# Patient Record
Sex: FEMALE | Race: WHITE | NOT HISPANIC OR LATINO | Employment: UNEMPLOYED | URBAN - METROPOLITAN AREA
[De-identification: names, ages, dates, MRNs, and addresses within clinical notes are randomized per-mention and may not be internally consistent; named-entity substitution may affect disease eponyms.]

---

## 2023-02-14 PROBLEM — R63.5 WEIGHT GAIN: Status: ACTIVE | Noted: 2023-02-14

## 2023-02-14 PROBLEM — L65.9 HAIR LOSS: Status: ACTIVE | Noted: 2023-02-14

## 2023-02-14 PROBLEM — N28.1 CYST OF RIGHT KIDNEY: Status: ACTIVE | Noted: 2023-02-14

## 2023-02-14 PROBLEM — N64.89 BREAST ASYMMETRY IN FEMALE: Status: ACTIVE | Noted: 2023-02-14

## 2023-02-14 PROBLEM — R31.29 OTHER MICROSCOPIC HEMATURIA: Status: ACTIVE | Noted: 2023-02-14

## 2023-02-14 PROBLEM — E55.9 VITAMIN D DEFICIENCY: Status: ACTIVE | Noted: 2023-02-14

## 2023-02-14 PROBLEM — K76.0 HEPATIC STEATOSIS: Status: ACTIVE | Noted: 2023-02-14

## 2023-02-14 PROBLEM — H91.93 BILATERAL HEARING LOSS: Status: ACTIVE | Noted: 2023-02-14

## 2023-02-14 PROBLEM — E78.2 HYPERLIPEMIA, MIXED: Status: ACTIVE | Noted: 2023-02-14

## 2023-02-14 PROBLEM — I73.9 PERIPHERAL ARTERIAL DISEASE (CMS-HCC): Status: ACTIVE | Noted: 2023-02-14

## 2023-02-14 PROBLEM — R10.32 ACUTE ABDOMINAL PAIN IN LEFT LOWER QUADRANT: Status: ACTIVE | Noted: 2023-02-14

## 2023-02-14 PROBLEM — R10.33 ABDOMINAL PAIN, ACUTE, PERIUMBILICAL: Status: ACTIVE | Noted: 2023-02-14

## 2023-02-14 PROBLEM — K43.9 VENTRAL HERNIA: Status: ACTIVE | Noted: 2023-02-14

## 2023-02-14 PROBLEM — E03.9 HYPOTHYROIDISM: Status: ACTIVE | Noted: 2023-02-14

## 2023-02-14 PROBLEM — Z85.41 HISTORY OF CERVICAL CANCER: Status: ACTIVE | Noted: 2023-02-14

## 2023-02-14 PROBLEM — R91.1 NODULE OF LOWER LOBE OF RIGHT LUNG: Status: ACTIVE | Noted: 2023-02-14

## 2023-02-14 PROBLEM — R30.0 DYSURIA: Status: ACTIVE | Noted: 2023-02-14

## 2023-02-14 PROBLEM — K57.30 DIVERTICULOSIS OF COLON: Status: ACTIVE | Noted: 2023-02-14

## 2023-02-14 PROBLEM — S61.451A CAT BITE OF RIGHT HAND: Status: ACTIVE | Noted: 2023-02-14

## 2023-02-14 PROBLEM — M81.0 POSTMENOPAUSAL BONE LOSS: Status: ACTIVE | Noted: 2023-02-14

## 2023-02-14 PROBLEM — D64.9 ANEMIA: Status: ACTIVE | Noted: 2023-02-14

## 2023-02-14 PROBLEM — L25.9 CONTACT DERMATITIS: Status: ACTIVE | Noted: 2023-02-14

## 2023-02-14 PROBLEM — E66.01 MORBID OBESITY (MULTI): Status: ACTIVE | Noted: 2023-02-14

## 2023-02-14 PROBLEM — R60.0 BILATERAL EDEMA OF LOWER EXTREMITY: Status: ACTIVE | Noted: 2023-02-14
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
## 2023-02-14 PROBLEM — D50.9 IRON DEFICIENCY ANEMIA: Status: ACTIVE | Noted: 2023-02-14

## 2023-02-14 PROBLEM — R91.1 NODULE OF MIDDLE LOBE OF RIGHT LUNG: Status: ACTIVE | Noted: 2023-02-14

## 2023-02-14 PROBLEM — I83.91 VARICOSE VEINS OF RIGHT LOWER EXTREMITY: Status: ACTIVE | Noted: 2023-02-14

## 2023-02-14 RX ORDER — FERROUS GLUCONATE 324(38)MG
1 TABLET ORAL DAILY
COMMUNITY
Start: 2022-08-23 | End: 2023-03-27 | Stop reason: SDUPTHER

## 2023-02-14 RX ORDER — DOXYCYCLINE 100 MG/1
1 CAPSULE ORAL EVERY 12 HOURS
COMMUNITY
Start: 2022-09-06 | End: 2024-01-11 | Stop reason: WASHOUT

## 2023-02-14 RX ORDER — ERGOCALCIFEROL 1.25 MG/1
1 CAPSULE ORAL
COMMUNITY
Start: 2020-03-02 | End: 2023-03-27 | Stop reason: SDUPTHER

## 2023-02-14 RX ORDER — CLOPIDOGREL BISULFATE 75 MG/1
1 TABLET ORAL DAILY
COMMUNITY
Start: 2020-03-11 | End: 2023-03-27 | Stop reason: SDUPTHER

## 2023-03-27 ENCOUNTER — OFFICE VISIT (OUTPATIENT)
Dept: PRIMARY CARE | Facility: CLINIC | Age: 73
End: 2023-03-27
Payer: MEDICARE

## 2023-03-27 VITALS
SYSTOLIC BLOOD PRESSURE: 144 MMHG | OXYGEN SATURATION: 97 % | RESPIRATION RATE: 18 BRPM | BODY MASS INDEX: 42.69 KG/M2 | WEIGHT: 232 LBS | HEIGHT: 62 IN | HEART RATE: 78 BPM | DIASTOLIC BLOOD PRESSURE: 92 MMHG | TEMPERATURE: 97.1 F

## 2023-03-27 DIAGNOSIS — K29.70 HELICOBACTER PYLORI GASTRITIS: ICD-10-CM

## 2023-03-27 DIAGNOSIS — E55.9 VITAMIN D DEFICIENCY: ICD-10-CM

## 2023-03-27 DIAGNOSIS — E78.2 HYPERLIPEMIA, MIXED: ICD-10-CM

## 2023-03-27 DIAGNOSIS — E03.9 HYPOTHYROIDISM, UNSPECIFIED TYPE: ICD-10-CM

## 2023-03-27 DIAGNOSIS — Z00.00 ROUTINE GENERAL MEDICAL EXAMINATION AT HEALTH CARE FACILITY: ICD-10-CM

## 2023-03-27 DIAGNOSIS — R60.0 BILATERAL EDEMA OF LOWER EXTREMITY: ICD-10-CM

## 2023-03-27 DIAGNOSIS — I73.9 PERIPHERAL ARTERIAL DISEASE (CMS-HCC): Primary | ICD-10-CM

## 2023-03-27 DIAGNOSIS — B96.81 HELICOBACTER PYLORI GASTRITIS: ICD-10-CM

## 2023-03-27 DIAGNOSIS — E66.01 MORBID OBESITY (MULTI): ICD-10-CM

## 2023-03-27 DIAGNOSIS — D50.9 IRON DEFICIENCY ANEMIA, UNSPECIFIED IRON DEFICIENCY ANEMIA TYPE: ICD-10-CM

## 2023-03-27 DIAGNOSIS — K57.30 DIVERTICULOSIS OF COLON: ICD-10-CM

## 2023-03-27 DIAGNOSIS — R63.5 WEIGHT GAIN: ICD-10-CM

## 2023-03-27 DIAGNOSIS — K76.0 HEPATIC STEATOSIS: ICD-10-CM

## 2023-03-27 PROCEDURE — 1157F ADVNC CARE PLAN IN RCRD: CPT | Performed by: INTERNAL MEDICINE

## 2023-03-27 PROCEDURE — 1036F TOBACCO NON-USER: CPT | Performed by: INTERNAL MEDICINE

## 2023-03-27 PROCEDURE — 99214 OFFICE O/P EST MOD 30 MIN: CPT | Performed by: INTERNAL MEDICINE

## 2023-03-27 PROCEDURE — 1170F FXNL STATUS ASSESSED: CPT | Performed by: INTERNAL MEDICINE

## 2023-03-27 PROCEDURE — G0439 PPPS, SUBSEQ VISIT: HCPCS | Performed by: INTERNAL MEDICINE

## 2023-03-27 PROCEDURE — 1160F RVW MEDS BY RX/DR IN RCRD: CPT | Performed by: INTERNAL MEDICINE

## 2023-03-27 PROCEDURE — 1159F MED LIST DOCD IN RCRD: CPT | Performed by: INTERNAL MEDICINE

## 2023-03-27 RX ORDER — FERROUS GLUCONATE 324(38)MG
1 TABLET ORAL DAILY
Qty: 90 TABLET | Refills: 1 | Status: SHIPPED | OUTPATIENT
Start: 2023-03-27 | End: 2023-06-25

## 2023-03-27 RX ORDER — CLOPIDOGREL BISULFATE 75 MG/1
75 TABLET ORAL DAILY
Qty: 90 TABLET | Refills: 1 | Status: SHIPPED | OUTPATIENT
Start: 2023-03-27 | End: 2023-06-25

## 2023-03-27 RX ORDER — ERGOCALCIFEROL 1.25 MG/1
1 CAPSULE ORAL
Qty: 12 CAPSULE | Refills: 1 | Status: SHIPPED | OUTPATIENT
Start: 2023-03-27 | End: 2023-06-25

## 2023-03-27 SDOH — ECONOMIC STABILITY: FOOD INSECURITY: WITHIN THE PAST 12 MONTHS, THE FOOD YOU BOUGHT JUST DIDN'T LAST AND YOU DIDN'T HAVE MONEY TO GET MORE.: NEVER TRUE

## 2023-03-27 SDOH — ECONOMIC STABILITY: FOOD INSECURITY: WITHIN THE PAST 12 MONTHS, YOU WORRIED THAT YOUR FOOD WOULD RUN OUT BEFORE YOU GOT MONEY TO BUY MORE.: NEVER TRUE

## 2023-03-27 ASSESSMENT — LIFESTYLE VARIABLES
HOW OFTEN DO YOU HAVE A DRINK CONTAINING ALCOHOL: NEVER
SKIP TO QUESTIONS 9-10: 1
HOW MANY STANDARD DRINKS CONTAINING ALCOHOL DO YOU HAVE ON A TYPICAL DAY: PATIENT DOES NOT DRINK
HOW OFTEN DO YOU HAVE SIX OR MORE DRINKS ON ONE OCCASION: NEVER
AUDIT-C TOTAL SCORE: 0

## 2023-03-27 ASSESSMENT — ACTIVITIES OF DAILY LIVING (ADL)
DOING_HOUSEWORK: INDEPENDENT
TAKING_MEDICATION: INDEPENDENT
BATHING: INDEPENDENT
MANAGING_FINANCES: INDEPENDENT
DRESSING: INDEPENDENT
GROCERY_SHOPPING: INDEPENDENT

## 2023-03-27 ASSESSMENT — ENCOUNTER SYMPTOMS
FEVER: 0
DIZZINESS: 0
HEADACHES: 0
LIGHT-HEADEDNESS: 0
SHORTNESS OF BREATH: 0
PALPITATIONS: 0
UNEXPECTED WEIGHT CHANGE: 0
ABDOMINAL PAIN: 0
CHILLS: 0
MUSCULOSKELETAL NEGATIVE: 1
OCCASIONAL FEELINGS OF UNSTEADINESS: 0
DEPRESSION: 0
DIARRHEA: 0
PSYCHIATRIC NEGATIVE: 1
CHEST TIGHTNESS: 0
DIAPHORESIS: 0
POLYDIPSIA: 0
VOMITING: 0
NAUSEA: 0
COUGH: 0
EYES NEGATIVE: 1
FATIGUE: 1
ACTIVITY CHANGE: 0
ABDOMINAL DISTENTION: 0
APPETITE CHANGE: 0
LOSS OF SENSATION IN FEET: 0
BLOOD IN STOOL: 0
CONSTIPATION: 0
ENDOCRINE NEGATIVE: 1

## 2023-03-27 ASSESSMENT — PATIENT HEALTH QUESTIONNAIRE - PHQ9
2. FEELING DOWN, DEPRESSED OR HOPELESS: NOT AT ALL
SUM OF ALL RESPONSES TO PHQ9 QUESTIONS 1 & 2: 0
1. LITTLE INTEREST OR PLEASURE IN DOING THINGS: NOT AT ALL

## 2023-03-27 NOTE — PROGRESS NOTES
"Subjective   Patient ID: Kayleen Rincon is a 73 y.o. female who presents for Follow-up and Medicare Annual Wellness Visit Subsequent.    HPI   Patient is here for follow-up after EGD on 11/2022 with Dr. Ion Knapp out of [facility]. Patient was diagnosed with H. Pylori and received instructions to take Tetracycline, metronidazole, and bismuth subsalicylate as prescribed. She completed this course. Has not experienced symptoms of heart burn, n/v, diarrhea, blood in stool. Was supposed to provide stool sample but was never contacted via [facility] for instructions on how to do that.   Has concerns about weight gain although diet and exercise have not changed.   Worried about chronic iron deficiency anemia, fatigue has worsened with continued use of iron supplementation. Patient  tested positive for H Pylori per EGD that was completed for evaluation of anemia, she had no symptoms of GERD    This is also a medicare wellness exam     Review of Systems   Constitutional:  Positive for fatigue (Worsened over last few months). Negative for activity change, appetite change, chills, diaphoresis, fever and unexpected weight change.   HENT: Negative.     Eyes: Negative.    Respiratory:  Negative for cough, chest tightness and shortness of breath.    Cardiovascular:  Negative for chest pain, palpitations and leg swelling.   Gastrointestinal:  Negative for abdominal distention, abdominal pain, blood in stool, constipation, diarrhea, nausea and vomiting.   Endocrine: Negative.  Negative for cold intolerance, heat intolerance, polydipsia and polyuria.   Musculoskeletal: Negative.    Skin: Negative.    Neurological:  Negative for dizziness, syncope, light-headedness and headaches.   Psychiatric/Behavioral: Negative.         Objective   BP (!) 144/92 (BP Location: Left arm, Patient Position: Sitting, BP Cuff Size: Large adult)   Pulse 78   Temp 36.2 °C (97.1 °F)   Resp 18   Ht 1.575 m (5' 2\")   Wt 105 kg " (232 lb)   SpO2 97%   BMI 42.43 kg/m²     Physical Exam  Vitals reviewed.   Constitutional:       Appearance: Normal appearance. She is obese. She is not ill-appearing.   HENT:      Head: Normocephalic and atraumatic.   Eyes:      Extraocular Movements: Extraocular movements intact.      Conjunctiva/sclera: Conjunctivae normal.   Cardiovascular:      Rate and Rhythm: Normal rate and regular rhythm.      Pulses: Normal pulses.      Heart sounds: Normal heart sounds.   Pulmonary:      Effort: Pulmonary effort is normal.      Comments: Diminished breath sounds throughout all fields  Chest:      Chest wall: No tenderness.   Abdominal:      General: Bowel sounds are normal.      Palpations: Abdomen is soft.      Tenderness: There is no abdominal tenderness. There is no guarding or rebound.      Comments: Abdomen is obese   Musculoskeletal:         General: Normal range of motion.      Cervical back: Normal range of motion and neck supple.   Skin:     General: Skin is warm and dry.   Neurological:      General: No focal deficit present.      Mental Status: She is alert and oriented to person, place, and time.   Psychiatric:         Mood and Affect: Mood normal.         Behavior: Behavior normal.         Assessment/Plan   Problem List Items Addressed This Visit          Circulatory    Peripheral arterial disease (CMS/HCC) - Primary, no changes for now, check routine labs       Digestive    Diverticulosis of colon     H. Pylori diagnosed on 11/23. Patient finished course of metronidazole, tetracycline. Patient self-discontinued omeprazole and only takes it as needed. Will check H pylori stool antigen, this appears to have been  ordered by GI in the past         Hepatic steatosis       Musculoskeletal    Bilateral edema of lower extremity: no edema noted today       Endocrine/Metabolic    Hypothyroidism: check labs as ordered    Morbid obesity (CMS/HCC)    Vitamin D deficiency: check labs       Hematologic    Iron  deficiency anemia: recheck labs, hopefully this resolved since H pylori was treated       Other    Hyperlipemia, mixed: check labs no med changes for now   Medicare wellness exam completed today also, colonoscopy was recently completed  Follow-up here in 6 months with routine blood work.

## 2023-03-27 NOTE — PROGRESS NOTES
"Subjective   Reason for Visit: Kayleen Rincon is an 73 y.o. female here for a Medicare Wellness visit.     Past Medical, Surgical, and Family History reviewed and updated in chart.    Reviewed all medications by prescribing practitioner or clinical pharmacist (such as prescriptions, OTCs, herbal therapies and supplements) and documented in the medical record.    HPI    Patient Care Team:  Kailash Triplett MD as PCP - General  Kailash Triplett MD as PCP - [employer] PCP     Review of Systems    Objective   Vitals:  BP (!) 144/92 (BP Location: Left arm, Patient Position: Sitting, BP Cuff Size: Large adult)   Pulse 78   Temp 36.2 °C (97.1 °F)   Resp 18   Ht 1.575 m (5' 2\")   Wt 105 kg (232 lb)   SpO2 97%   BMI 42.43 kg/m²       Physical Exam    Assessment/Plan   Problem List Items Addressed This Visit        Circulatory    Peripheral arterial disease (CMS/HCC) - Primary    Relevant Medications    clopidogrel (Plavix) 75 mg tablet       Digestive    Diverticulosis of colon    Current Assessment & Plan     H. Pylori diagnosed on 11/23. Patient finished course of metronidazole, tetracycline. Patient self-discontinued omeprazole and mario take it as needed.         Hepatic steatosis    Helicobacter pylori gastritis    Relevant Orders    H. pylori antigen, stool       Musculoskeletal    Bilateral edema of lower extremity       Endocrine/Metabolic    Hypothyroidism    Morbid obesity (CMS/HCC)    Vitamin D deficiency    Relevant Medications    ergocalciferol (Vitamin D-2) 1.25 MG (08420 UT) capsule    Weight gain    Current Assessment & Plan     15-20 pound unintentional weight gain without change in diet and exercise.            Hematologic    Iron deficiency anemia    Current Assessment & Plan     Continue taking iron supplementation. Will check CBC.         Relevant Medications    ferrous gluconate 324 (38 Fe) mg tablet       Other    Hyperlipemia, mixed   Other Visit Diagnoses     Routine " general medical examination at health care facility

## 2023-03-27 NOTE — ASSESSMENT & PLAN NOTE
H. Pylori diagnosed on 11/23. Patient finished course of metronidazole, tetracycline. Patient self-discontinued omeprazole and mario take it as needed.

## 2023-03-27 NOTE — ASSESSMENT & PLAN NOTE
H. Pylori diagnosed on 11/23. Patient finished course of metronidazole, tetracycline. Patient self-discontinued omeprazole and only takes it as needed.

## 2023-09-13 ENCOUNTER — LAB (OUTPATIENT)
Dept: LAB | Facility: LAB | Age: 73
End: 2023-09-13
Payer: MEDICARE

## 2023-09-13 DIAGNOSIS — E03.9 HYPOTHYROIDISM, UNSPECIFIED TYPE: ICD-10-CM

## 2023-09-13 DIAGNOSIS — K57.30 DIVERTICULOSIS OF COLON: ICD-10-CM

## 2023-09-13 DIAGNOSIS — R63.5 WEIGHT GAIN: ICD-10-CM

## 2023-09-13 DIAGNOSIS — D50.9 IRON DEFICIENCY ANEMIA, UNSPECIFIED IRON DEFICIENCY ANEMIA TYPE: ICD-10-CM

## 2023-09-13 DIAGNOSIS — E78.2 HYPERLIPEMIA, MIXED: ICD-10-CM

## 2023-09-13 DIAGNOSIS — R60.0 BILATERAL EDEMA OF LOWER EXTREMITY: ICD-10-CM

## 2023-09-13 DIAGNOSIS — I73.9 PERIPHERAL ARTERIAL DISEASE (CMS-HCC): ICD-10-CM

## 2023-09-13 DIAGNOSIS — E66.01 MORBID OBESITY (MULTI): ICD-10-CM

## 2023-09-13 DIAGNOSIS — K29.70 HELICOBACTER PYLORI GASTRITIS: ICD-10-CM

## 2023-09-13 DIAGNOSIS — E55.9 VITAMIN D DEFICIENCY: ICD-10-CM

## 2023-09-13 DIAGNOSIS — K76.0 HEPATIC STEATOSIS: ICD-10-CM

## 2023-09-13 DIAGNOSIS — B96.81 HELICOBACTER PYLORI GASTRITIS: ICD-10-CM

## 2023-09-13 DIAGNOSIS — Z00.00 ROUTINE GENERAL MEDICAL EXAMINATION AT HEALTH CARE FACILITY: ICD-10-CM

## 2023-09-13 LAB
ALANINE AMINOTRANSFERASE (SGPT) (U/L) IN SER/PLAS: 16 U/L (ref 7–45)
ALBUMIN (G/DL) IN SER/PLAS: 4.1 G/DL (ref 3.4–5)
ALKALINE PHOSPHATASE (U/L) IN SER/PLAS: 103 U/L (ref 33–136)
ANION GAP IN SER/PLAS: 12 MMOL/L (ref 10–20)
ASPARTATE AMINOTRANSFERASE (SGOT) (U/L) IN SER/PLAS: 13 U/L (ref 9–39)
BASOPHILS (10*3/UL) IN BLOOD BY AUTOMATED COUNT: 0.07 X10E9/L (ref 0–0.1)
BASOPHILS/100 LEUKOCYTES IN BLOOD BY AUTOMATED COUNT: 1 % (ref 0–2)
BILIRUBIN TOTAL (MG/DL) IN SER/PLAS: 0.7 MG/DL (ref 0–1.2)
CALCIDIOL (25 OH VITAMIN D3) (NG/ML) IN SER/PLAS: 41 NG/ML
CALCIUM (MG/DL) IN SER/PLAS: 9.2 MG/DL (ref 8.6–10.3)
CARBON DIOXIDE, TOTAL (MMOL/L) IN SER/PLAS: 26 MMOL/L (ref 21–32)
CHLORIDE (MMOL/L) IN SER/PLAS: 105 MMOL/L (ref 98–107)
CHOLESTEROL (MG/DL) IN SER/PLAS: 178 MG/DL (ref 0–199)
CHOLESTEROL IN HDL (MG/DL) IN SER/PLAS: 47.8 MG/DL
CHOLESTEROL/HDL RATIO: 3.7
CREATININE (MG/DL) IN SER/PLAS: 0.66 MG/DL (ref 0.5–1.05)
EOSINOPHILS (10*3/UL) IN BLOOD BY AUTOMATED COUNT: 0.11 X10E9/L (ref 0–0.4)
EOSINOPHILS/100 LEUKOCYTES IN BLOOD BY AUTOMATED COUNT: 1.5 % (ref 0–6)
ERYTHROCYTE DISTRIBUTION WIDTH (RATIO) BY AUTOMATED COUNT: 12.6 % (ref 11.5–14.5)
ERYTHROCYTE MEAN CORPUSCULAR HEMOGLOBIN CONCENTRATION (G/DL) BY AUTOMATED: 33.7 G/DL (ref 32–36)
ERYTHROCYTE MEAN CORPUSCULAR VOLUME (FL) BY AUTOMATED COUNT: 89 FL (ref 80–100)
ERYTHROCYTES (10*6/UL) IN BLOOD BY AUTOMATED COUNT: 4.99 X10E12/L (ref 4–5.2)
FERRITIN (UG/LL) IN SER/PLAS: 54 UG/L (ref 8–150)
GFR FEMALE: >90 ML/MIN/1.73M2
GLUCOSE (MG/DL) IN SER/PLAS: 99 MG/DL (ref 74–99)
HEMATOCRIT (%) IN BLOOD BY AUTOMATED COUNT: 44.5 % (ref 36–46)
HEMOGLOBIN (G/DL) IN BLOOD: 15 G/DL (ref 12–16)
IMMATURE GRANULOCYTES/100 LEUKOCYTES IN BLOOD BY AUTOMATED COUNT: 0.4 % (ref 0–0.9)
IRON (UG/DL) IN SER/PLAS: 76 UG/DL (ref 35–150)
IRON BINDING CAPACITY (UG/DL) IN SER/PLAS: 372 UG/DL (ref 240–445)
IRON SATURATION (%) IN SER/PLAS: 20 % (ref 25–45)
LDL: 109 MG/DL (ref 0–99)
LEUKOCYTES (10*3/UL) IN BLOOD BY AUTOMATED COUNT: 7.2 X10E9/L (ref 4.4–11.3)
LYMPHOCYTES (10*3/UL) IN BLOOD BY AUTOMATED COUNT: 1.42 X10E9/L (ref 0.8–3)
LYMPHOCYTES/100 LEUKOCYTES IN BLOOD BY AUTOMATED COUNT: 19.6 % (ref 13–44)
MONOCYTES (10*3/UL) IN BLOOD BY AUTOMATED COUNT: 0.42 X10E9/L (ref 0.05–0.8)
MONOCYTES/100 LEUKOCYTES IN BLOOD BY AUTOMATED COUNT: 5.8 % (ref 2–10)
NEUTROPHILS (10*3/UL) IN BLOOD BY AUTOMATED COUNT: 5.18 X10E9/L (ref 1.6–5.5)
NEUTROPHILS/100 LEUKOCYTES IN BLOOD BY AUTOMATED COUNT: 71.7 % (ref 40–80)
PLATELETS (10*3/UL) IN BLOOD AUTOMATED COUNT: 310 X10E9/L (ref 150–450)
POTASSIUM (MMOL/L) IN SER/PLAS: 4.2 MMOL/L (ref 3.5–5.3)
PROTEIN TOTAL: 6.6 G/DL (ref 6.4–8.2)
SODIUM (MMOL/L) IN SER/PLAS: 139 MMOL/L (ref 136–145)
TRIGLYCERIDE (MG/DL) IN SER/PLAS: 105 MG/DL (ref 0–149)
UREA NITROGEN (MG/DL) IN SER/PLAS: 9 MG/DL (ref 6–23)
VLDL: 21 MG/DL (ref 0–40)

## 2023-09-13 PROCEDURE — 83540 ASSAY OF IRON: CPT

## 2023-09-13 PROCEDURE — 85025 COMPLETE CBC W/AUTO DIFF WBC: CPT

## 2023-09-13 PROCEDURE — 83550 IRON BINDING TEST: CPT

## 2023-09-13 PROCEDURE — 82728 ASSAY OF FERRITIN: CPT

## 2023-09-13 PROCEDURE — 36415 COLL VENOUS BLD VENIPUNCTURE: CPT

## 2023-09-13 PROCEDURE — 80053 COMPREHEN METABOLIC PANEL: CPT

## 2023-09-13 PROCEDURE — 82306 VITAMIN D 25 HYDROXY: CPT

## 2023-09-13 PROCEDURE — 80061 LIPID PANEL: CPT

## 2023-09-27 ENCOUNTER — OFFICE VISIT (OUTPATIENT)
Dept: PRIMARY CARE | Facility: CLINIC | Age: 73
End: 2023-09-27
Payer: MEDICARE

## 2023-09-27 VITALS
DIASTOLIC BLOOD PRESSURE: 80 MMHG | SYSTOLIC BLOOD PRESSURE: 130 MMHG | BODY MASS INDEX: 37.54 KG/M2 | HEART RATE: 68 BPM | TEMPERATURE: 96.8 F | HEIGHT: 62 IN | WEIGHT: 204 LBS | OXYGEN SATURATION: 97 %

## 2023-09-27 DIAGNOSIS — E78.2 HYPERLIPEMIA, MIXED: Primary | ICD-10-CM

## 2023-09-27 DIAGNOSIS — I73.9 PERIPHERAL ARTERIAL DISEASE (CMS-HCC): ICD-10-CM

## 2023-09-27 DIAGNOSIS — E55.9 VITAMIN D DEFICIENCY: ICD-10-CM

## 2023-09-27 DIAGNOSIS — M54.50 SEVERE LOW BACK PAIN: ICD-10-CM

## 2023-09-27 DIAGNOSIS — K29.70 HELICOBACTER PYLORI GASTRITIS: ICD-10-CM

## 2023-09-27 DIAGNOSIS — B96.81 HELICOBACTER PYLORI GASTRITIS: ICD-10-CM

## 2023-09-27 DIAGNOSIS — E03.9 HYPOTHYROIDISM, UNSPECIFIED TYPE: ICD-10-CM

## 2023-09-27 DIAGNOSIS — D50.9 IRON DEFICIENCY ANEMIA, UNSPECIFIED IRON DEFICIENCY ANEMIA TYPE: ICD-10-CM

## 2023-09-27 PROCEDURE — 1036F TOBACCO NON-USER: CPT | Performed by: INTERNAL MEDICINE

## 2023-09-27 PROCEDURE — 1159F MED LIST DOCD IN RCRD: CPT | Performed by: INTERNAL MEDICINE

## 2023-09-27 PROCEDURE — 1170F FXNL STATUS ASSESSED: CPT | Performed by: INTERNAL MEDICINE

## 2023-09-27 PROCEDURE — 99214 OFFICE O/P EST MOD 30 MIN: CPT | Performed by: INTERNAL MEDICINE

## 2023-09-27 PROCEDURE — 1125F AMNT PAIN NOTED PAIN PRSNT: CPT | Performed by: INTERNAL MEDICINE

## 2023-09-27 PROCEDURE — 1160F RVW MEDS BY RX/DR IN RCRD: CPT | Performed by: INTERNAL MEDICINE

## 2023-09-27 RX ORDER — PANTOPRAZOLE SODIUM 40 MG/1
40 TABLET, DELAYED RELEASE ORAL DAILY
COMMUNITY
Start: 2023-04-23 | End: 2023-09-27 | Stop reason: SDUPTHER

## 2023-09-27 RX ORDER — PANTOPRAZOLE SODIUM 40 MG/1
40 TABLET, DELAYED RELEASE ORAL DAILY
Qty: 90 TABLET | Refills: 1 | Status: SHIPPED | OUTPATIENT
Start: 2023-09-27 | End: 2024-01-11 | Stop reason: WASHOUT

## 2023-09-27 RX ORDER — CLOPIDOGREL BISULFATE 75 MG/1
75 TABLET ORAL DAILY
COMMUNITY
End: 2023-09-27 | Stop reason: SDUPTHER

## 2023-09-27 RX ORDER — ERGOCALCIFEROL 1.25 MG/1
1 CAPSULE ORAL
COMMUNITY
Start: 2023-07-28 | End: 2023-09-27 | Stop reason: SDUPTHER

## 2023-09-27 RX ORDER — CLOPIDOGREL BISULFATE 75 MG/1
75 TABLET ORAL DAILY
Qty: 90 TABLET | Refills: 1 | Status: SHIPPED | OUTPATIENT
Start: 2023-09-27 | End: 2024-01-11 | Stop reason: SDUPTHER

## 2023-09-27 RX ORDER — FERROUS SULFATE 325(65) MG
65 TABLET ORAL
Qty: 90 TABLET | Refills: 1 | Status: SHIPPED | OUTPATIENT
Start: 2023-09-27 | End: 2024-01-11 | Stop reason: SDUPTHER

## 2023-09-27 RX ORDER — DOXYCYCLINE 100 MG/1
100 CAPSULE ORAL EVERY 12 HOURS
Qty: 90 CAPSULE | Refills: 1 | Status: CANCELLED | OUTPATIENT
Start: 2023-09-27

## 2023-09-27 RX ORDER — ERGOCALCIFEROL 1.25 MG/1
1 CAPSULE ORAL
Qty: 12 CAPSULE | Refills: 1 | Status: SHIPPED | OUTPATIENT
Start: 2023-09-27 | End: 2024-01-11 | Stop reason: SDUPTHER

## 2023-09-27 RX ORDER — FERROUS SULFATE 325(65) MG
65 TABLET ORAL
COMMUNITY
End: 2023-09-27 | Stop reason: SDUPTHER

## 2023-09-27 ASSESSMENT — ACTIVITIES OF DAILY LIVING (ADL)
FEEDING YOURSELF: INDEPENDENT
TOILETING: INDEPENDENT
BATHING: INDEPENDENT
GROOMING: INDEPENDENT
BATHING: INDEPENDENT
TOILETING: INDEPENDENT
JUDGMENT_ADEQUATE_SAFELY_COMPLETE_DAILY_ACTIVITIES: YES
MANAGING FINANCES: INDEPENDENT
ADEQUATE_TO_COMPLETE_ADL: YES
ADEQUATE_TO_COMPLETE_ADL: YES
FEEDING: INDEPENDENT
PATIENT'S MEMORY ADEQUATE TO SAFELY COMPLETE DAILY ACTIVITIES?: YES
NEEDS ASSISTANCE WITH FOOD: INDEPENDENT
PREPARING MEALS: INDEPENDENT
PILL BOX USED: YES
DOING HOUSEWORK: INDEPENDENT
DRESSING: INDEPENDENT
HEARING - LEFT EAR: FUNCTIONAL
EATING: INDEPENDENT
STIL DRIVING: YES
DRESSING YOURSELF: INDEPENDENT
HEARING - RIGHT EAR: FUNCTIONAL
WALKS IN HOME: INDEPENDENT
USING TELEPHONE: INDEPENDENT
TAKING MEDICATION: INDEPENDENT
USING TRANSPORTATION: INDEPENDENT
GROCERY SHOPPING: INDEPENDENT

## 2023-09-27 ASSESSMENT — PATIENT HEALTH QUESTIONNAIRE - PHQ9
SUM OF ALL RESPONSES TO PHQ9 QUESTIONS 1 AND 2: 0
1. LITTLE INTEREST OR PLEASURE IN DOING THINGS: NOT AT ALL
2. FEELING DOWN, DEPRESSED OR HOPELESS: NOT AT ALL

## 2023-09-27 ASSESSMENT — COLUMBIA-SUICIDE SEVERITY RATING SCALE - C-SSRS
1. IN THE PAST MONTH, HAVE YOU WISHED YOU WERE DEAD OR WISHED YOU COULD GO TO SLEEP AND NOT WAKE UP?: NO
2. HAVE YOU ACTUALLY HAD ANY THOUGHTS OF KILLING YOURSELF?: NO
6. HAVE YOU EVER DONE ANYTHING, STARTED TO DO ANYTHING, OR PREPARED TO DO ANYTHING TO END YOUR LIFE?: NO

## 2023-09-27 ASSESSMENT — ENCOUNTER SYMPTOMS
LOSS OF SENSATION IN FEET: 0
DEPRESSION: 0
OCCASIONAL FEELINGS OF UNSTEADINESS: 0

## 2023-09-27 ASSESSMENT — PAIN SCALES - GENERAL: PAINLEVEL: 6

## 2023-09-27 NOTE — PROGRESS NOTES
Chief Complaint/HPI:  6-months follow-up.    PAD: He was seen by Dr. Luke. She has a stent to the LLE. She is currently on Plavix. She does not report of any issues.    Diverticulosis: She does not follow-up with GI. She denies of any recent issues. She completed a course of Abx to treat for H. Pylori in the past.    Vitamin D deficiency: Serum vitamin D of 41. She takes 50,000 international units once/weekly.    JOANNE: She takes oral iron supplementation. Hemoglobin and hematocrit are stable. Iron studies performed: Ferritin 54, Iron 76, TIBC 372, and iron saturation 20%     GERD: She has Pantoprazole 40 mg at home but does not regularly use the medication.  She has a history of H. Pylori, she did have a GI evaluation completed    Back pain: this is a chronic issue, back pain is much less tolerable now, uses acetaminophen, multiple OTC meds have not been effective. TENs unit helps a little, she goes to chiropractor. Pain is worse when walking. Patient has had back pain for 30 years, it seems to be worsening. Denies loss of bladder or bowel control    ROS otherwise negative aside from what was mentioned above in HPI.      Patient Active Problem List   Diagnosis    Abdominal pain, acute, periumbilical    Acute abdominal pain in left lower quadrant    Anemia    Bilateral edema of lower extremity    Bilateral hearing loss    Breast asymmetry in female    Cat bite of right hand    Contact dermatitis    Cyst of right kidney    Diverticulosis of colon    Dysuria    Hair loss    Hepatic steatosis    History of cervical cancer    Hyperlipemia, mixed    Hypothyroidism    Iron deficiency anemia    Nodule of lower lobe of right lung    Nodule of middle lobe of right lung    Morbid obesity (CMS/HCC)    Other microscopic hematuria    Peripheral arterial disease (CMS/HCC)    Postmenopausal bone loss    Varicose veins of right lower extremity    Ventral hernia    Vitamin D deficiency    Weight gain    Helicobacter pylori gastritis  "   Routine general medical examination at health care facility    Severe low back pain         Past Medical History:   Diagnosis Date    Personal history of malignant neoplasm of breast     History of malignant neoplasm of breast    Personal history of other endocrine, nutritional and metabolic disease     History of hyperlipidemia     Past Surgical History:   Procedure Laterality Date    OTHER SURGICAL HISTORY  09/16/2019    Cholecystectomy    OTHER SURGICAL HISTORY  09/16/2019    Right mastectomy    OTHER SURGICAL HISTORY  09/16/2019    Appendectomy    OTHER SURGICAL HISTORY  09/16/2019    Tonsillectomy    OTHER SURGICAL HISTORY  09/16/2019    Rhinoplasty    OTHER SURGICAL HISTORY  11/15/2019    Vascular surgical procedure     Social History     Social History Narrative    Not on file         ALLERGIES  Latex and Penicillins      MEDICATIONS  Current Outpatient Medications on File Prior to Visit   Medication Sig Dispense Refill    [DISCONTINUED] cholecalciferol, vitamin D3, (VITAMIN D3 ORAL) Take 1 tablet by mouth 1 (one) time per week.      [DISCONTINUED] clopidogrel (Plavix) 75 mg tablet Take 1 tablet (75 mg) by mouth once daily.      [DISCONTINUED] ergocalciferol (Vitamin D-2) 1.25 MG (43448 UT) capsule Take 1 capsule (1,250 mcg) by mouth 1 (one) time per week.      [DISCONTINUED] ferrous sulfate (iron) 325 (65 Fe) MG tablet Take 1 tablet (65 mg of iron) by mouth once daily (M-F).      doxycycline (Vibramycin) 100 mg capsule Take 1 capsule (100 mg) by mouth every 12 hours.      [DISCONTINUED] pantoprazole (ProtoNix) 40 mg EC tablet Take 1 tablet (40 mg) by mouth once daily.       No current facility-administered medications on file prior to visit.         PHYSICAL EXAM  /80 (BP Location: Left arm, Patient Position: Sitting, BP Cuff Size: Large adult)   Pulse 68   Temp 36 °C (96.8 °F)   Ht 1.575 m (5' 2\")   Wt 92.5 kg (204 lb)   SpO2 97%   BMI 37.31 kg/m²   Body mass index is 37.31 " kg/m².  Constitutional:       Appearance: Normal appearance. She is obese. She is not ill-appearing. Accompanied by   HENT:      Head: Normocephalic and atraumatic. No thyromegaly or neck masses  Eyes:      Extraocular Movements: Extraocular movements intact.      Conjunctiva/sclera: Conjunctivae normal.   Cardiovascular:      Rate and Rhythm: Normal rate and regular rhythm.      Pulses: Normal pulses.      Heart sounds: Normal heart sounds. No carotid bruits, no peripheral edema  Pulmonary:      Effort: Pulmonary effort is normal.      Comments: Diminished breath sounds throughout all fields  Chest:      Chest wall: No tenderness.   Abdominal:      General: Bowel sounds are normal.       Comments: Abdomen is obese   Musculoskeletal:         General: Normal range of motion. Tenderness noted over the superior lumbar, inferior thoracic spine region     Cervical back: Normal range of motion and neck supple.   Skin:     General: Skin is warm and dry. No lesions on exposed skin  Neurological:      General: No focal deficit present.      Mental Status: She is alert and oriented to person, place, and time.   Psychiatric:         Mood and Affect: Mood normal.         Behavior: Behavior normal.           ASSESSMENT/PLAN  Problem List Items Addressed This Visit       Hyperlipemia, mixed - Primary    Current Assessment & Plan     Stable now, no changes         Hypothyroidism    Current Assessment & Plan     Stable at present time, no changes         Iron deficiency anemia    Current Assessment & Plan     It has improved, no changes in therapy for now         Relevant Medications    ferrous sulfate (iron) 325 (65 Fe) MG tablet    Peripheral arterial disease (CMS/HCC)    Relevant Medications    clopidogrel (Plavix) 75 mg tablet    Vitamin D deficiency    Relevant Medications    ergocalciferol (Vitamin D-2) 1.25 MG (94845 UT) capsule    Helicobacter pylori gastritis    Current Assessment & Plan     Recheck H pylori          Relevant Medications    pantoprazole (ProtoNix) 40 mg EC tablet    Other Relevant Orders    H. pylori antigen, stool    Severe low back pain    Current Assessment & Plan     Check SPEP, check XR of LS and T spines, will refer based on findings, this is a progressive and chronic issue          Relevant Orders    XR thoracic spine complete 4+ views    XR lumbar spine complete 4+ views    Serum Protein Electrophoresis     Check labs and x rays as ordered, follow up after testing    Kailash Triplett MD

## 2023-09-27 NOTE — ASSESSMENT & PLAN NOTE
Check SPEP, check XR of LS and T spines, will refer based on findings, this is a progressive and chronic issue

## 2023-10-13 ENCOUNTER — LAB (OUTPATIENT)
Dept: LAB | Facility: LAB | Age: 73
End: 2023-10-13
Payer: MEDICARE

## 2023-10-13 ENCOUNTER — HOSPITAL ENCOUNTER (OUTPATIENT)
Dept: RADIOLOGY | Facility: HOSPITAL | Age: 73
Discharge: HOME | End: 2023-10-13
Payer: MEDICARE

## 2023-10-13 DIAGNOSIS — K29.70 HELICOBACTER PYLORI GASTRITIS: ICD-10-CM

## 2023-10-13 DIAGNOSIS — M54.50 SEVERE LOW BACK PAIN: ICD-10-CM

## 2023-10-13 DIAGNOSIS — B96.81 HELICOBACTER PYLORI GASTRITIS: ICD-10-CM

## 2023-10-13 PROCEDURE — 84165 PROTEIN E-PHORESIS SERUM: CPT | Performed by: INTERNAL MEDICINE

## 2023-10-13 PROCEDURE — 72110 X-RAY EXAM L-2 SPINE 4/>VWS: CPT | Performed by: RADIOLOGY

## 2023-10-13 PROCEDURE — 72072 X-RAY EXAM THORAC SPINE 3VWS: CPT | Performed by: RADIOLOGY

## 2023-10-13 PROCEDURE — 72110 X-RAY EXAM L-2 SPINE 4/>VWS: CPT | Mod: FY

## 2023-10-13 PROCEDURE — 84165 PROTEIN E-PHORESIS SERUM: CPT

## 2023-10-13 PROCEDURE — 84155 ASSAY OF PROTEIN SERUM: CPT

## 2023-10-13 PROCEDURE — 72072 X-RAY EXAM THORAC SPINE 3VWS: CPT | Mod: FY

## 2023-10-13 PROCEDURE — 87449 NOS EACH ORGANISM AG IA: CPT

## 2023-10-13 PROCEDURE — 36415 COLL VENOUS BLD VENIPUNCTURE: CPT

## 2023-10-14 LAB — PROT SERPL-MCNC: 7 G/DL (ref 6.4–8.2)

## 2023-10-15 LAB — H PYLORI AG STL QL IA: POSITIVE

## 2023-10-16 LAB
ALBUMIN: 4.1 G/DL (ref 3.4–5)
ALPHA 1 GLOBULIN: 0.4 G/DL (ref 0.2–0.6)
ALPHA 2 GLOBULIN: 0.8 G/DL (ref 0.4–1.1)
BETA GLOBULIN: 0.7 G/DL (ref 0.5–1.2)
GAMMA GLOBULIN: 1 G/DL (ref 0.5–1.4)
PATH REVIEW-SERUM PROTEIN ELECTROPHORESIS: NORMAL
PROTEIN ELECTROPHORESIS COMMENT: NORMAL

## 2023-10-20 ENCOUNTER — E-VISIT (OUTPATIENT)
Dept: PRIMARY CARE | Facility: CLINIC | Age: 73
End: 2023-10-20
Payer: MEDICARE

## 2023-10-20 DIAGNOSIS — K29.70 HELICOBACTER PYLORI GASTRITIS: Primary | ICD-10-CM

## 2023-10-20 DIAGNOSIS — B96.81 HELICOBACTER PYLORI GASTRITIS: Primary | ICD-10-CM

## 2023-10-20 DIAGNOSIS — D50.9 IRON DEFICIENCY ANEMIA, UNSPECIFIED IRON DEFICIENCY ANEMIA TYPE: ICD-10-CM

## 2023-10-20 RX ORDER — OMEPRAZOLE 20 MG/1
TABLET, DELAYED RELEASE ORAL
Qty: 58 TABLET | Refills: 0 | Status: SHIPPED | OUTPATIENT
Start: 2023-10-20 | End: 2024-10-19

## 2023-10-20 RX ORDER — METRONIDAZOLE 500 MG/1
500 TABLET ORAL 2 TIMES DAILY
Qty: 28 TABLET | Refills: 0 | Status: SHIPPED | OUTPATIENT
Start: 2023-10-20 | End: 2023-11-03

## 2023-10-20 RX ORDER — CLARITHROMYCIN 500 MG/1
500 TABLET, FILM COATED ORAL 2 TIMES DAILY
Qty: 28 TABLET | Refills: 0 | Status: SHIPPED | OUTPATIENT
Start: 2023-10-20 | End: 2023-11-03

## 2023-11-29 ENCOUNTER — APPOINTMENT (OUTPATIENT)
Dept: PRIMARY CARE | Facility: CLINIC | Age: 73
End: 2023-11-29
Payer: MEDICARE

## 2024-01-04 ENCOUNTER — LAB (OUTPATIENT)
Dept: LAB | Facility: LAB | Age: 74
End: 2024-01-04
Payer: MEDICARE

## 2024-01-04 DIAGNOSIS — D50.9 IRON DEFICIENCY ANEMIA, UNSPECIFIED IRON DEFICIENCY ANEMIA TYPE: ICD-10-CM

## 2024-01-04 DIAGNOSIS — K29.70 HELICOBACTER PYLORI GASTRITIS: ICD-10-CM

## 2024-01-04 DIAGNOSIS — B96.81 HELICOBACTER PYLORI GASTRITIS: ICD-10-CM

## 2024-01-04 LAB
BASOPHILS # BLD AUTO: 0.08 X10*3/UL (ref 0–0.1)
BASOPHILS NFR BLD AUTO: 1 %
EOSINOPHIL # BLD AUTO: 0.24 X10*3/UL (ref 0–0.4)
EOSINOPHIL NFR BLD AUTO: 3 %
ERYTHROCYTE [DISTWIDTH] IN BLOOD BY AUTOMATED COUNT: 12.6 % (ref 11.5–14.5)
HCT VFR BLD AUTO: 45.8 % (ref 36–46)
HGB BLD-MCNC: 15 G/DL (ref 12–16)
IMM GRANULOCYTES # BLD AUTO: 0.03 X10*3/UL (ref 0–0.5)
IMM GRANULOCYTES NFR BLD AUTO: 0.4 % (ref 0–0.9)
LYMPHOCYTES # BLD AUTO: 2.41 X10*3/UL (ref 0.8–3)
LYMPHOCYTES NFR BLD AUTO: 30.4 %
MCH RBC QN AUTO: 29.7 PG (ref 26–34)
MCHC RBC AUTO-ENTMCNC: 32.8 G/DL (ref 32–36)
MCV RBC AUTO: 91 FL (ref 80–100)
MONOCYTES # BLD AUTO: 0.57 X10*3/UL (ref 0.05–0.8)
MONOCYTES NFR BLD AUTO: 7.2 %
NEUTROPHILS # BLD AUTO: 4.6 X10*3/UL (ref 1.6–5.5)
NEUTROPHILS NFR BLD AUTO: 58 %
NRBC BLD-RTO: 0 /100 WBCS (ref 0–0)
PLATELET # BLD AUTO: 336 X10*3/UL (ref 150–450)
RBC # BLD AUTO: 5.05 X10*6/UL (ref 4–5.2)
WBC # BLD AUTO: 7.9 X10*3/UL (ref 4.4–11.3)

## 2024-01-04 PROCEDURE — 85025 COMPLETE CBC W/AUTO DIFF WBC: CPT

## 2024-01-04 PROCEDURE — 36415 COLL VENOUS BLD VENIPUNCTURE: CPT

## 2024-01-09 ENCOUNTER — LAB (OUTPATIENT)
Dept: LAB | Facility: LAB | Age: 74
End: 2024-01-09
Payer: MEDICARE

## 2024-01-09 DIAGNOSIS — K29.70 HELICOBACTER PYLORI GASTRITIS: ICD-10-CM

## 2024-01-09 DIAGNOSIS — B96.81 HELICOBACTER PYLORI GASTRITIS: ICD-10-CM

## 2024-01-09 PROCEDURE — 87449 NOS EACH ORGANISM AG IA: CPT

## 2024-01-11 ENCOUNTER — OFFICE VISIT (OUTPATIENT)
Dept: PRIMARY CARE | Facility: CLINIC | Age: 74
End: 2024-01-11
Payer: MEDICARE

## 2024-01-11 VITALS
OXYGEN SATURATION: 96 % | TEMPERATURE: 97.6 F | HEART RATE: 93 BPM | SYSTOLIC BLOOD PRESSURE: 123 MMHG | DIASTOLIC BLOOD PRESSURE: 87 MMHG | WEIGHT: 214 LBS | BODY MASS INDEX: 39.38 KG/M2 | RESPIRATION RATE: 16 BRPM | HEIGHT: 62 IN

## 2024-01-11 DIAGNOSIS — E66.01 MORBID OBESITY (MULTI): ICD-10-CM

## 2024-01-11 DIAGNOSIS — K29.70 HELICOBACTER PYLORI GASTRITIS: ICD-10-CM

## 2024-01-11 DIAGNOSIS — I73.9 PERIPHERAL ARTERIAL DISEASE (CMS-HCC): ICD-10-CM

## 2024-01-11 DIAGNOSIS — D50.9 IRON DEFICIENCY ANEMIA, UNSPECIFIED IRON DEFICIENCY ANEMIA TYPE: ICD-10-CM

## 2024-01-11 DIAGNOSIS — E78.2 HYPERLIPEMIA, MIXED: Primary | ICD-10-CM

## 2024-01-11 DIAGNOSIS — M41.24 DOUBLE THORACIC CURVE IDIOPATHIC SCOLIOSIS: ICD-10-CM

## 2024-01-11 DIAGNOSIS — E03.9 HYPOTHYROIDISM, UNSPECIFIED TYPE: ICD-10-CM

## 2024-01-11 DIAGNOSIS — E55.9 VITAMIN D DEFICIENCY: ICD-10-CM

## 2024-01-11 DIAGNOSIS — B96.81 HELICOBACTER PYLORI GASTRITIS: ICD-10-CM

## 2024-01-11 PROBLEM — Z86.39 HISTORY OF ELEVATED LIPIDS: Status: ACTIVE | Noted: 2024-01-11

## 2024-01-11 PROBLEM — R60.0 EDEMA OF LOWER EXTREMITY: Status: ACTIVE | Noted: 2024-01-11

## 2024-01-11 PROCEDURE — 1125F AMNT PAIN NOTED PAIN PRSNT: CPT | Performed by: INTERNAL MEDICINE

## 2024-01-11 PROCEDURE — 1159F MED LIST DOCD IN RCRD: CPT | Performed by: INTERNAL MEDICINE

## 2024-01-11 PROCEDURE — 1160F RVW MEDS BY RX/DR IN RCRD: CPT | Performed by: INTERNAL MEDICINE

## 2024-01-11 PROCEDURE — 1036F TOBACCO NON-USER: CPT | Performed by: INTERNAL MEDICINE

## 2024-01-11 PROCEDURE — 99214 OFFICE O/P EST MOD 30 MIN: CPT | Performed by: INTERNAL MEDICINE

## 2024-01-11 RX ORDER — FERROUS SULFATE 325(65) MG
65 TABLET ORAL
Qty: 90 TABLET | Refills: 1 | Status: SHIPPED | OUTPATIENT
Start: 2024-01-11

## 2024-01-11 RX ORDER — CLOPIDOGREL BISULFATE 75 MG/1
75 TABLET ORAL DAILY
Qty: 90 TABLET | Refills: 1 | Status: SHIPPED | OUTPATIENT
Start: 2024-01-11

## 2024-01-11 RX ORDER — ERGOCALCIFEROL 1.25 MG/1
1 CAPSULE ORAL
Qty: 12 CAPSULE | Refills: 1 | Status: SHIPPED | OUTPATIENT
Start: 2024-01-11

## 2024-01-11 SDOH — ECONOMIC STABILITY: FOOD INSECURITY: WITHIN THE PAST 12 MONTHS, YOU WORRIED THAT YOUR FOOD WOULD RUN OUT BEFORE YOU GOT MONEY TO BUY MORE.: NEVER TRUE

## 2024-01-11 SDOH — ECONOMIC STABILITY: FOOD INSECURITY: WITHIN THE PAST 12 MONTHS, THE FOOD YOU BOUGHT JUST DIDN'T LAST AND YOU DIDN'T HAVE MONEY TO GET MORE.: NEVER TRUE

## 2024-01-11 ASSESSMENT — LIFESTYLE VARIABLES
AUDIT-C TOTAL SCORE: 0
SKIP TO QUESTIONS 9-10: 1
HOW MANY STANDARD DRINKS CONTAINING ALCOHOL DO YOU HAVE ON A TYPICAL DAY: PATIENT DOES NOT DRINK
HOW OFTEN DO YOU HAVE SIX OR MORE DRINKS ON ONE OCCASION: NEVER
HOW OFTEN DO YOU HAVE A DRINK CONTAINING ALCOHOL: NEVER

## 2024-01-11 ASSESSMENT — PATIENT HEALTH QUESTIONNAIRE - PHQ9
SUM OF ALL RESPONSES TO PHQ9 QUESTIONS 1 & 2: 0
2. FEELING DOWN, DEPRESSED OR HOPELESS: NOT AT ALL
1. LITTLE INTEREST OR PLEASURE IN DOING THINGS: NOT AT ALL

## 2024-01-11 NOTE — ASSESSMENT & PLAN NOTE
Noted on thoracic spine x ray, will refer to PT for an assessment. Try conservative treatment first.

## 2024-01-11 NOTE — PROGRESS NOTES
Chief Complaint/HPI:  PAD: He was seen by Dr. Luke. She has a stent to the LLE. She is doing pretty well now. She is currently on Plavix. She does not report of any issues.     Diverticulosis: She does not follow-up with GI. She denies of any recent issues. She completed a course of Abx to treat for H. Pylori in the past. Patient is having stool tested for H. Pylori again     Vitamin D deficiency: She takes 50,000 international units once/weekly.     JOANNE: She takes oral iron supplementation 5 days per week.       GERD: She patient uses omeprazole.  She has a history of H. Pylori, she did have a GI evaluation completed     Back pain: this is a chronic issue, back pain is much less tolerable now, uses acetaminophen, multiple OTC meds have not been effective. TENs unit helps a little, she goes to chiropractor. Pain is worse when walking. Patient has had back pain for 30 years, it seems to be worsening. Denies loss of bladder or bowel control, x rays of the back were completed.      ROS otherwise negative aside from what was mentioned above in HPI.      Patient Active Problem List   Diagnosis    Abdominal pain, acute, periumbilical    Acute abdominal pain in left lower quadrant    Anemia    Bilateral edema of lower extremity    Bilateral hearing loss    Breast asymmetry in female    Cat bite of right hand    Contact dermatitis    Cyst of right kidney    Diverticulosis of colon    Dysuria    Hair loss    Hepatic steatosis    History of cervical cancer    Hyperlipemia, mixed    Hypothyroidism    Iron deficiency anemia    Nodule of lower lobe of right lung    Nodule of middle lobe of right lung    Morbid obesity (CMS/HCC)    Other microscopic hematuria    Peripheral arterial disease (CMS/HCC)    Postmenopausal bone loss    Varicose veins of right lower extremity    Ventral hernia    Vitamin D deficiency    Weight gain    Helicobacter pylori gastritis    Routine general medical examination at health care facility    Severe  low back pain    Edema of lower extremity    History of elevated lipids    Double thoracic curve idiopathic scoliosis         Past Medical History:   Diagnosis Date    Personal history of malignant neoplasm of breast     History of malignant neoplasm of breast    Personal history of other endocrine, nutritional and metabolic disease     History of hyperlipidemia     Past Surgical History:   Procedure Laterality Date    CATARACT EXTRACTION Bilateral 2023    OTHER SURGICAL HISTORY  09/16/2019    Cholecystectomy    OTHER SURGICAL HISTORY  09/16/2019    Right mastectomy    OTHER SURGICAL HISTORY  09/16/2019    Appendectomy    OTHER SURGICAL HISTORY  09/16/2019    Tonsillectomy    OTHER SURGICAL HISTORY  09/16/2019    Rhinoplasty    OTHER SURGICAL HISTORY  11/15/2019    Vascular surgical procedure     Social History     Social History Narrative    Not on file         ALLERGIES  Latex and Penicillins      MEDICATIONS  Current Outpatient Medications on File Prior to Visit   Medication Sig Dispense Refill    omeprazole OTC (PriLOSEC OTC) 20 mg EC tablet Take 1 tablet (20 mg) by mouth twice a day for 14 days, then take daily for 30 days more. 58 tablet 0    [DISCONTINUED] clopidogrel (Plavix) 75 mg tablet Take 1 tablet (75 mg) by mouth once daily. 90 tablet 1    [DISCONTINUED] ergocalciferol (Vitamin D-2) 1.25 MG (37459 UT) capsule Take 1 capsule (1,250 mcg) by mouth 1 (one) time per week. 12 capsule 1    [DISCONTINUED] ferrous sulfate (iron) 325 (65 Fe) MG tablet Take 1 tablet (65 mg of iron) by mouth once daily (M-F). 90 tablet 1    [DISCONTINUED] doxycycline (Vibramycin) 100 mg capsule Take 1 capsule (100 mg) by mouth every 12 hours.      [DISCONTINUED] pantoprazole (ProtoNix) 40 mg EC tablet Take 1 tablet (40 mg) by mouth once daily. (Patient not taking: Reported on 1/11/2024) 90 tablet 1     No current facility-administered medications on file prior to visit.         PHYSICAL EXAM  /87 (BP Location: Left arm,  "Patient Position: Sitting, BP Cuff Size: Large adult)   Pulse 93   Temp 36.4 °C (97.6 °F)   Resp 16   Ht 1.575 m (5' 2\")   Wt 97.1 kg (214 lb)   SpO2 96%   BMI 39.14 kg/m²   Body mass index is 39.14 kg/m².  Constitutional:       Appearance: Normal appearance. She is obese. She is not ill-appearing.   HENT:      Head: Normocephalic and atraumatic. No thyromegaly or neck masses  Eyes:      Extraocular Movements: Extraocular movements intact.      Conjunctiva/sclera: Conjunctivae normal.   Cardiovascular:      Rate and Rhythm: Normal rate and regular rhythm.      Pulses: Normal pulses.      Heart sounds: Normal heart sounds. No carotid bruits, no peripheral edema  Pulmonary:      Effort: Pulmonary effort is normal.      Comments: Diminished breath sounds throughout all fields, especially in the bases bilaterally  Chest:      Chest wall: No tenderness.   Abdominal:      General: Bowel sounds are normal.       Comments: Abdomen is obese   Musculoskeletal:         General: Normal range of motion. Tenderness noted over the superior lumbar, inferior thoracic spine region, slight dextro scoliosis of the thoracic spine is noted with palpation      Cervical back: Normal range of motion and neck supple.   Skin:     General: Skin is warm and dry. No lesions on exposed skin  Neurological:      General: No focal deficit present.      Mental Status: She is alert and oriented to person, place, and time.   Psychiatric:         Mood and Affect: Mood normal.         Behavior: Behavior normal.     ASSESSMENT/PLAN  Problem List Items Addressed This Visit       Hyperlipemia, mixed - Primary    Current Assessment & Plan     Check routine labs, no med change for now         Relevant Orders    Lipid Panel    Albumin , Urine Random    Comprehensive Metabolic Panel    CBC and Auto Differential    Hypothyroidism    Relevant Orders    Albumin , Urine Random    Comprehensive Metabolic Panel    CBC and Auto Differential    Iron deficiency " anemia    Current Assessment & Plan     Continue to monitor labs, continue iron therapy, recheck labs in a few months         Relevant Medications    ferrous sulfate, 325 mg ferrous sulfate, (iron) tablet    Other Relevant Orders    Albumin , Urine Random    Comprehensive Metabolic Panel    CBC and Auto Differential    Iron and TIBC    Morbid obesity (CMS/HCC)    Relevant Orders    Albumin , Urine Random    Comprehensive Metabolic Panel    CBC and Auto Differential    Peripheral arterial disease (CMS/HCC)    Relevant Medications    clopidogrel (Plavix) 75 mg tablet    Other Relevant Orders    Albumin , Urine Random    Comprehensive Metabolic Panel    CBC and Auto Differential    Vitamin D deficiency    Current Assessment & Plan     Continue vitamin d, recheck labs as ordered         Relevant Medications    ergocalciferol (Vitamin D-2) 1.25 MG (64367 UT) capsule    Other Relevant Orders    Albumin , Urine Random    Vitamin D 25-Hydroxy,Total (for eval of Vitamin D levels)    Comprehensive Metabolic Panel    CBC and Auto Differential    Helicobacter pylori gastritis    Current Assessment & Plan     Follow up H pylori testing is pending         Relevant Orders    Albumin , Urine Random    Double thoracic curve idiopathic scoliosis    Current Assessment & Plan     Noted on thoracic spine x ray, will refer to PT for an assessment. Try conservative treatment first.         Relevant Orders    Albumin , Urine Random    Comprehensive Metabolic Panel    CBC and Auto Differential    Referral to Physical Therapy         Kailash Triplett MD

## 2024-01-12 LAB — H PYLORI AG STL QL IA: NEGATIVE

## 2024-02-12 ENCOUNTER — LAB (OUTPATIENT)
Dept: LAB | Facility: LAB | Age: 74
End: 2024-02-12
Payer: MEDICARE

## 2024-02-12 DIAGNOSIS — I73.9 PERIPHERAL ARTERIAL DISEASE (CMS-HCC): ICD-10-CM

## 2024-02-12 DIAGNOSIS — E78.2 HYPERLIPEMIA, MIXED: ICD-10-CM

## 2024-02-12 DIAGNOSIS — E55.9 VITAMIN D DEFICIENCY: ICD-10-CM

## 2024-02-12 DIAGNOSIS — E03.9 HYPOTHYROIDISM, UNSPECIFIED TYPE: ICD-10-CM

## 2024-02-12 DIAGNOSIS — K29.70 HELICOBACTER PYLORI GASTRITIS: ICD-10-CM

## 2024-02-12 DIAGNOSIS — E66.01 MORBID OBESITY (MULTI): ICD-10-CM

## 2024-02-12 DIAGNOSIS — B96.81 HELICOBACTER PYLORI GASTRITIS: ICD-10-CM

## 2024-02-12 DIAGNOSIS — M41.24 DOUBLE THORACIC CURVE IDIOPATHIC SCOLIOSIS: ICD-10-CM

## 2024-02-12 DIAGNOSIS — D50.9 IRON DEFICIENCY ANEMIA, UNSPECIFIED IRON DEFICIENCY ANEMIA TYPE: ICD-10-CM

## 2024-02-12 LAB
25(OH)D3 SERPL-MCNC: 39 NG/ML (ref 30–100)
ALBUMIN SERPL BCP-MCNC: 4 G/DL (ref 3.4–5)
ALP SERPL-CCNC: 90 U/L (ref 33–136)
ALT SERPL W P-5'-P-CCNC: 12 U/L (ref 7–45)
ANION GAP SERPL CALC-SCNC: 9 MMOL/L (ref 10–20)
AST SERPL W P-5'-P-CCNC: 12 U/L (ref 9–39)
BASOPHILS # BLD AUTO: 0.07 X10*3/UL (ref 0–0.1)
BASOPHILS NFR BLD AUTO: 1 %
BILIRUB SERPL-MCNC: 0.6 MG/DL (ref 0–1.2)
BUN SERPL-MCNC: 13 MG/DL (ref 6–23)
CALCIUM SERPL-MCNC: 9.4 MG/DL (ref 8.6–10.3)
CHLORIDE SERPL-SCNC: 105 MMOL/L (ref 98–107)
CHOLEST SERPL-MCNC: 186 MG/DL (ref 0–199)
CHOLESTEROL/HDL RATIO: 3.8
CO2 SERPL-SCNC: 30 MMOL/L (ref 21–32)
CREAT SERPL-MCNC: 0.7 MG/DL (ref 0.5–1.05)
CREAT UR-MCNC: 88 MG/DL (ref 20–320)
EGFRCR SERPLBLD CKD-EPI 2021: 90 ML/MIN/1.73M*2
EOSINOPHIL # BLD AUTO: 0.17 X10*3/UL (ref 0–0.4)
EOSINOPHIL NFR BLD AUTO: 2.3 %
ERYTHROCYTE [DISTWIDTH] IN BLOOD BY AUTOMATED COUNT: 12.7 % (ref 11.5–14.5)
GLUCOSE SERPL-MCNC: 91 MG/DL (ref 74–99)
HCT VFR BLD AUTO: 43.2 % (ref 36–46)
HDLC SERPL-MCNC: 48.4 MG/DL
HGB BLD-MCNC: 13.8 G/DL (ref 12–16)
IMM GRANULOCYTES # BLD AUTO: 0.03 X10*3/UL (ref 0–0.5)
IMM GRANULOCYTES NFR BLD AUTO: 0.4 % (ref 0–0.9)
IRON SATN MFR SERPL: 31 % (ref 25–45)
IRON SERPL-MCNC: 109 UG/DL (ref 35–150)
LDLC SERPL CALC-MCNC: 108 MG/DL
LYMPHOCYTES # BLD AUTO: 1.52 X10*3/UL (ref 0.8–3)
LYMPHOCYTES NFR BLD AUTO: 20.7 %
MCH RBC QN AUTO: 29.1 PG (ref 26–34)
MCHC RBC AUTO-ENTMCNC: 31.9 G/DL (ref 32–36)
MCV RBC AUTO: 91 FL (ref 80–100)
MICROALBUMIN UR-MCNC: <7 MG/L
MICROALBUMIN/CREAT UR: NORMAL MG/G{CREAT}
MONOCYTES # BLD AUTO: 0.46 X10*3/UL (ref 0.05–0.8)
MONOCYTES NFR BLD AUTO: 6.3 %
NEUTROPHILS # BLD AUTO: 5.11 X10*3/UL (ref 1.6–5.5)
NEUTROPHILS NFR BLD AUTO: 69.3 %
NON HDL CHOLESTEROL: 138 MG/DL (ref 0–149)
NRBC BLD-RTO: 0 /100 WBCS (ref 0–0)
PLATELET # BLD AUTO: 311 X10*3/UL (ref 150–450)
POTASSIUM SERPL-SCNC: 4.5 MMOL/L (ref 3.5–5.3)
PROT SERPL-MCNC: 6.4 G/DL (ref 6.4–8.2)
RBC # BLD AUTO: 4.74 X10*6/UL (ref 4–5.2)
SODIUM SERPL-SCNC: 139 MMOL/L (ref 136–145)
TIBC SERPL-MCNC: 357 UG/DL (ref 240–445)
TRIGL SERPL-MCNC: 149 MG/DL (ref 0–149)
UIBC SERPL-MCNC: 248 UG/DL (ref 110–370)
VLDL: 30 MG/DL (ref 0–40)
WBC # BLD AUTO: 7.4 X10*3/UL (ref 4.4–11.3)

## 2024-02-12 PROCEDURE — 83550 IRON BINDING TEST: CPT

## 2024-02-12 PROCEDURE — 82570 ASSAY OF URINE CREATININE: CPT

## 2024-02-12 PROCEDURE — 36415 COLL VENOUS BLD VENIPUNCTURE: CPT

## 2024-02-12 PROCEDURE — 83540 ASSAY OF IRON: CPT

## 2024-02-12 PROCEDURE — 80053 COMPREHEN METABOLIC PANEL: CPT

## 2024-02-12 PROCEDURE — 82043 UR ALBUMIN QUANTITATIVE: CPT

## 2024-02-12 PROCEDURE — 82306 VITAMIN D 25 HYDROXY: CPT

## 2024-02-12 PROCEDURE — 85025 COMPLETE CBC W/AUTO DIFF WBC: CPT

## 2024-02-12 PROCEDURE — 80061 LIPID PANEL: CPT

## 2024-04-01 ENCOUNTER — APPOINTMENT (OUTPATIENT)
Dept: PRIMARY CARE | Facility: CLINIC | Age: 74
End: 2024-04-01
Payer: MEDICARE

## 2024-04-11 ENCOUNTER — HOSPITAL ENCOUNTER (OUTPATIENT)
Dept: RADIOLOGY | Facility: EXTERNAL LOCATION | Age: 74
Discharge: HOME | End: 2024-04-11

## 2024-04-11 DIAGNOSIS — M25.561 RIGHT KNEE PAIN, UNSPECIFIED CHRONICITY: ICD-10-CM

## 2024-04-16 ENCOUNTER — TELEPHONE (OUTPATIENT)
Dept: PRIMARY CARE | Facility: CLINIC | Age: 74
End: 2024-04-16
Payer: MEDICARE

## 2024-04-16 DIAGNOSIS — S89.91XS RIGHT KNEE INJURY, SEQUELA: Primary | ICD-10-CM

## 2024-04-16 RX ORDER — TRAMADOL HYDROCHLORIDE 50 MG/1
50 TABLET ORAL EVERY 8 HOURS PRN
Qty: 10 TABLET | Refills: 0 | Status: SHIPPED | OUTPATIENT
Start: 2024-04-16 | End: 2024-04-21

## 2024-04-16 NOTE — TELEPHONE ENCOUNTER
Pt twisted her right knee and went to Urgent Care on 04/11/24.  At Urgent Care she was told that she sprained her right knee.  She was give a steroid pack to help with her knee and she is on her 5th day of taking them.  She was not given anything for pain and she was wondering if you would be able to prescribe anything for her.  She isn't able to sleep due to the pain (8 out of 10).  He knee is swollen, not red, and no fevers.  She has been using Voltaren cream for pain.  Her pharmacy is [facility] in [place].

## 2024-04-17 NOTE — TELEPHONE ENCOUNTER
Called patient, notified of message, patient expressed verbal understanding had no questions at this time.

## 2024-04-23 ENCOUNTER — OFFICE VISIT (OUTPATIENT)
Dept: ORTHOPEDIC SURGERY | Facility: CLINIC | Age: 74
End: 2024-04-23
Payer: MEDICARE

## 2024-04-23 ENCOUNTER — HOSPITAL ENCOUNTER (OUTPATIENT)
Dept: RADIOLOGY | Facility: EXTERNAL LOCATION | Age: 74
Discharge: HOME | End: 2024-04-23

## 2024-04-23 VITALS — WEIGHT: 213 LBS | BODY MASS INDEX: 39.2 KG/M2 | HEIGHT: 62 IN

## 2024-04-23 DIAGNOSIS — S89.91XS RIGHT KNEE INJURY, SEQUELA: ICD-10-CM

## 2024-04-23 DIAGNOSIS — M17.11 PRIMARY OSTEOARTHRITIS OF RIGHT KNEE: Primary | ICD-10-CM

## 2024-04-23 PROCEDURE — 99244 OFF/OP CNSLTJ NEW/EST MOD 40: CPT | Performed by: EMERGENCY MEDICINE

## 2024-04-23 PROCEDURE — 1123F ACP DISCUSS/DSCN MKR DOCD: CPT | Performed by: EMERGENCY MEDICINE

## 2024-04-23 PROCEDURE — 20611 DRAIN/INJ JOINT/BURSA W/US: CPT | Performed by: EMERGENCY MEDICINE

## 2024-04-23 PROCEDURE — 1157F ADVNC CARE PLAN IN RCRD: CPT | Performed by: EMERGENCY MEDICINE

## 2024-04-23 PROCEDURE — 1159F MED LIST DOCD IN RCRD: CPT | Performed by: EMERGENCY MEDICINE

## 2024-04-23 RX ORDER — METHYLPREDNISOLONE ACETATE 40 MG/ML
80 INJECTION, SUSPENSION INTRA-ARTICULAR; INTRALESIONAL; INTRAMUSCULAR; SOFT TISSUE
Status: COMPLETED | OUTPATIENT
Start: 2024-04-23 | End: 2024-04-23

## 2024-04-23 RX ORDER — LIDOCAINE HYDROCHLORIDE 10 MG/ML
4 INJECTION INFILTRATION; PERINEURAL
Status: COMPLETED | OUTPATIENT
Start: 2024-04-23 | End: 2024-04-23

## 2024-04-23 RX ADMIN — METHYLPREDNISOLONE ACETATE 80 MG: 40 INJECTION, SUSPENSION INTRA-ARTICULAR; INTRALESIONAL; INTRAMUSCULAR; SOFT TISSUE at 11:43

## 2024-04-23 RX ADMIN — LIDOCAINE HYDROCHLORIDE 4 ML: 10 INJECTION INFILTRATION; PERINEURAL at 11:43

## 2024-04-23 NOTE — PROGRESS NOTES
Subjective    Patient ID: Kayleen Rincon is a 74 y.o. female.    Chief Complaint: Pain of the Right Knee (Referred by Dr. Triplett - pain for 3 weeks, pain radiates down to ankle. Denies any injury. She has been taking tramadol for pain but it doesn't help at all. Urgent care gave her a steroid pack no help with that either. She has been resting and ice. )     Last Surgery: No surgery found  Last Surgery Date: No surgery found    Kayleen Gallegos is an extremely pleasant 74-year-old female coming in with some acute on chronic right knee pain that has been getting worse over the past 3 weeks.  The location of her pain is mostly anterior but she does state it is diffusely involving her entire right knee.  She does have some swelling and says that today her pain is 7 out of 10.  She went to urgent care on 4/11/2024 and had x-rays done.  She was told that she may have a near sprain.  She is not currently taking any medications telling me that she does not want to mask the pain.  She was ultimately referred here by her PCP Dr. Triplett for nonsurgical management of her right knee pain.  She denies any trauma.  No infectious symptoms.  No other complaints here today.        Objective   Right Knee Exam     Muscle Strength   The patient has normal right knee strength.    Tenderness   The patient is experiencing tenderness in the medial joint line.    Range of Motion   Extension:  normal   Flexion:  abnormal     Tests   Erasto:  Medial - positive   Varus: negative Valgus: negative  Lachman:  Anterior - negative      Drawer:  Anterior - negative    Posterior - negative  Patellar apprehension: negative    Other   Erythema: absent  Sensation: normal  Pulse: present  Swelling: moderate  Effusion: effusion present    Comments:  Patient has a small joint effusion      Left Knee Exam   Left knee exam is normal.            Image Results:  Point of Care Ultrasound  These images are not reportable by radiology and will not be  interpreted   by  Radiologists.    X-rays were reviewed and interpreted by me on 4/23/2024.  No evidence of acute injuries or fractures.  Medial compartment DJD.     Patient ID: Kayleen Rincon is a 74 y.o. female.    L Inj/Asp: R knee on 4/23/2024 11:43 AM  Indications: pain  Details: 22 G needle, ultrasound-guided superolateral approach  Medications: 80 mg methylPREDNISolone acetate 40 mg/mL; 4 mL lidocaine 10 mg/mL (1 %)  Outcome: tolerated well, no immediate complications  Procedure, treatment alternatives, risks and benefits explained, specific risks discussed. Consent was given by the patient. Immediately prior to procedure a time out was called to verify the correct patient, procedure, equipment, support staff and site/side marked as required. Patient was prepped and draped in the usual sterile fashion.           Assessment/Plan   Encounter Diagnoses:  Primary osteoarthritis of right knee    Right knee injury, sequela    Orders Placed This Encounter    L Inj/Asp    Point of Care Ultrasound     No follow-ups on file.    We discussed her treatment options and agreed for her to begin using Tylenol at prescription dosing along with Voltaren 3 times daily.  We also performed a right knee cortisone injection under ultrasound guidance.  The patient tolerated the procedure well without any complications and activity modifications were reviewed.  She will follow-up with me in about 4 weeks to determine her response to this plan.    ** Please excuse any errors in grammar or translation related to this dictation. Voice recognition software was utilized to prepare this document. **       Eligio Galvan MD  [facility]

## 2024-04-25 ENCOUNTER — APPOINTMENT (OUTPATIENT)
Dept: PHYSICAL THERAPY | Facility: HOSPITAL | Age: 74
End: 2024-04-25
Payer: MEDICARE

## 2024-05-06 ENCOUNTER — APPOINTMENT (OUTPATIENT)
Dept: PHYSICAL THERAPY | Facility: HOSPITAL | Age: 74
End: 2024-05-06
Payer: MEDICARE

## 2024-05-21 ENCOUNTER — OFFICE VISIT (OUTPATIENT)
Dept: ORTHOPEDIC SURGERY | Facility: CLINIC | Age: 74
End: 2024-05-21
Payer: MEDICARE

## 2024-05-21 ENCOUNTER — TELEPHONE (OUTPATIENT)
Dept: ORTHOPEDIC SURGERY | Facility: CLINIC | Age: 74
End: 2024-05-21

## 2024-05-21 VITALS — BODY MASS INDEX: 39.2 KG/M2 | WEIGHT: 213 LBS | HEIGHT: 62 IN

## 2024-05-21 DIAGNOSIS — M17.11 PRIMARY OSTEOARTHRITIS OF RIGHT KNEE: Primary | ICD-10-CM

## 2024-05-21 PROCEDURE — 1123F ACP DISCUSS/DSCN MKR DOCD: CPT | Performed by: EMERGENCY MEDICINE

## 2024-05-21 PROCEDURE — 1159F MED LIST DOCD IN RCRD: CPT | Performed by: EMERGENCY MEDICINE

## 2024-05-21 PROCEDURE — 99213 OFFICE O/P EST LOW 20 MIN: CPT | Performed by: EMERGENCY MEDICINE

## 2024-05-21 PROCEDURE — 1157F ADVNC CARE PLAN IN RCRD: CPT | Performed by: EMERGENCY MEDICINE

## 2024-05-21 NOTE — PROGRESS NOTES
Subjective    Patient ID: Kayleen Rincon is a 74 y.o. female.    Chief Complaint: Pain of the Right Knee (Injection 4/23/24 - states the injection and Voltaren have helped, states its about 50% better)     Last Surgery: No surgery found  Last Surgery Date: No surgery found    Kayleen Gallegos is an extremely pleasant 74-year-old female coming in with some acute on chronic right knee pain that has been getting worse over the past 3 weeks.  The location of her pain is mostly anterior but she does state it is diffusely involving her entire right knee.  She does have some swelling and says that today her pain is 7 out of 10.  She went to urgent care on 4/11/2024 and had x-rays done.  She was told that she may have a near sprain.  She is not currently taking any medications telling me that she does not want to mask the pain.  She was ultimately referred here by her PCP Dr. Triplett for nonsurgical management of her right knee pain.  She denies any trauma.  No infectious symptoms.  No other complaints here today. We agreed for her to begin using Tylenol at prescription dosing along with Voltaren 3 times daily.  We also performed a right knee cortisone injection under ultrasound guidance.  The patient tolerated the procedure well without any complications and activity modifications were reviewed.  She will follow-up with me in about 4 weeks to determine her response to this plan.    Update on 5/21/2024.  Kayleen Gallegos is coming back in for a follow-up visit for her acute on chronic right knee pain.  Since her injection on 4/23/2024 she has experienced some relief but is still having a significant amount of pain and discomfort with trouble sleeping at night.  Overall she thinks she is about 50% better.  Her pain is still mostly over the medial joint line.  Moderate in severity.  Worse with activity.        Objective   Right Knee Exam     Muscle Strength   The patient has normal right knee strength.    Tenderness   The patient is  experiencing tenderness in the medial joint line.    Range of Motion   Extension:  normal   Flexion:  abnormal     Tests   Erasto:  Medial - positive   Varus: negative Valgus: negative  Lachman:  Anterior - negative      Drawer:  Anterior - negative    Posterior - negative  Patellar apprehension: negative    Other   Erythema: absent  Sensation: normal  Pulse: present  Swelling: moderate  Effusion: effusion present    Comments:  Patient has a small joint effusion      Left Knee Exam   Left knee exam is normal.            Image Results:  Point of Care Ultrasound  These images are not reportable by radiology and will not be interpreted   by  Radiologists.    No new imaging today    Patient ID: Kayleen Rincon is a 74 y.o. female.    Procedures    Assessment/Plan   Encounter Diagnoses:  Primary osteoarthritis of right knee    No orders of the defined types were placed in this encounter.    No follow-ups on file.    We discussed her treatment options and agreed for her to continue Voltaren and prescription dose Tylenol 3 times daily for her pain.  In the meantime, we will begin the approval process for viscosupplementation injections and we will follow-up her once the medication arrives.      Of note, we prayed for her daughter who has a court date later today.    ** Please excuse any errors in grammar or translation related to this dictation. Voice recognition software was utilized to prepare this document. **       Eligio Galvan MD  [facility]

## 2024-06-12 ENCOUNTER — APPOINTMENT (OUTPATIENT)
Dept: PRIMARY CARE | Facility: CLINIC | Age: 74
End: 2024-06-12
Payer: MEDICARE

## 2024-06-12 VITALS
RESPIRATION RATE: 16 BRPM | HEIGHT: 62 IN | DIASTOLIC BLOOD PRESSURE: 88 MMHG | BODY MASS INDEX: 39.38 KG/M2 | HEART RATE: 90 BPM | WEIGHT: 214 LBS | TEMPERATURE: 97.7 F | OXYGEN SATURATION: 99 % | SYSTOLIC BLOOD PRESSURE: 141 MMHG

## 2024-06-12 DIAGNOSIS — I73.9 PERIPHERAL ARTERIAL DISEASE (CMS-HCC): ICD-10-CM

## 2024-06-12 DIAGNOSIS — E78.2 HYPERLIPEMIA, MIXED: Primary | ICD-10-CM

## 2024-06-12 DIAGNOSIS — M17.11 PRIMARY OSTEOARTHRITIS OF RIGHT KNEE: ICD-10-CM

## 2024-06-12 DIAGNOSIS — E55.9 VITAMIN D DEFICIENCY: ICD-10-CM

## 2024-06-12 DIAGNOSIS — E66.01 MORBID OBESITY (MULTI): ICD-10-CM

## 2024-06-12 DIAGNOSIS — E03.9 HYPOTHYROIDISM, UNSPECIFIED TYPE: ICD-10-CM

## 2024-06-12 DIAGNOSIS — D50.9 IRON DEFICIENCY ANEMIA, UNSPECIFIED IRON DEFICIENCY ANEMIA TYPE: ICD-10-CM

## 2024-06-12 PROBLEM — M25.561 RIGHT KNEE PAIN: Status: ACTIVE | Noted: 2024-04-11

## 2024-06-12 PROCEDURE — 1123F ACP DISCUSS/DSCN MKR DOCD: CPT | Performed by: INTERNAL MEDICINE

## 2024-06-12 PROCEDURE — 1157F ADVNC CARE PLAN IN RCRD: CPT | Performed by: INTERNAL MEDICINE

## 2024-06-12 PROCEDURE — 1036F TOBACCO NON-USER: CPT | Performed by: INTERNAL MEDICINE

## 2024-06-12 PROCEDURE — 1159F MED LIST DOCD IN RCRD: CPT | Performed by: INTERNAL MEDICINE

## 2024-06-12 PROCEDURE — 99214 OFFICE O/P EST MOD 30 MIN: CPT | Performed by: INTERNAL MEDICINE

## 2024-06-12 PROCEDURE — 1125F AMNT PAIN NOTED PAIN PRSNT: CPT | Performed by: INTERNAL MEDICINE

## 2024-06-12 RX ORDER — FERROUS SULFATE 325(65) MG
65 TABLET ORAL
Qty: 90 TABLET | Refills: 1 | Status: SHIPPED | OUTPATIENT
Start: 2024-06-12

## 2024-06-12 RX ORDER — CLOPIDOGREL BISULFATE 75 MG/1
75 TABLET ORAL DAILY
Qty: 90 TABLET | Refills: 1 | Status: SHIPPED | OUTPATIENT
Start: 2024-06-12

## 2024-06-12 RX ORDER — ROSUVASTATIN CALCIUM 5 MG/1
5 TABLET, COATED ORAL DAILY
Qty: 90 TABLET | Refills: 1 | Status: SHIPPED | OUTPATIENT
Start: 2024-06-12 | End: 2025-07-17

## 2024-06-12 RX ORDER — ERGOCALCIFEROL 1.25 MG/1
1 CAPSULE ORAL
Qty: 12 CAPSULE | Refills: 1 | Status: SHIPPED | OUTPATIENT
Start: 2024-06-16

## 2024-06-12 SDOH — ECONOMIC STABILITY: FOOD INSECURITY: WITHIN THE PAST 12 MONTHS, THE FOOD YOU BOUGHT JUST DIDN'T LAST AND YOU DIDN'T HAVE MONEY TO GET MORE.: NEVER TRUE

## 2024-06-12 SDOH — ECONOMIC STABILITY: FOOD INSECURITY: WITHIN THE PAST 12 MONTHS, YOU WORRIED THAT YOUR FOOD WOULD RUN OUT BEFORE YOU GOT MONEY TO BUY MORE.: NEVER TRUE

## 2024-06-12 ASSESSMENT — LIFESTYLE VARIABLES
SKIP TO QUESTIONS 9-10: 1
AUDIT-C TOTAL SCORE: 0
HOW OFTEN DO YOU HAVE A DRINK CONTAINING ALCOHOL: NEVER
HOW MANY STANDARD DRINKS CONTAINING ALCOHOL DO YOU HAVE ON A TYPICAL DAY: PATIENT DOES NOT DRINK
HOW OFTEN DO YOU HAVE SIX OR MORE DRINKS ON ONE OCCASION: NEVER

## 2024-06-12 ASSESSMENT — PATIENT HEALTH QUESTIONNAIRE - PHQ9
1. LITTLE INTEREST OR PLEASURE IN DOING THINGS: NOT AT ALL
2. FEELING DOWN, DEPRESSED OR HOPELESS: NOT AT ALL
SUM OF ALL RESPONSES TO PHQ9 QUESTIONS 1 & 2: 0

## 2024-06-12 ASSESSMENT — PAIN SCALES - GENERAL: PAINLEVEL: 6

## 2024-06-12 NOTE — PROGRESS NOTES
Chief Complaint/HPI:    Right knee pain: patient had an injection per Dr Galvan. The patient continues to have right knee pain, she is awaiting a gel injection per Dr Levine.  Patient does not want a knee replacement.     PAD: He was seen by Dr. Luke. She has a stent to the LLE. She is doing pretty well now. She is currently on Plavix. She does not report of any issues. She has not had a follow up appointment, she has not followed up with him for 2-3 years.      Diverticulosis/ black stools: She does not follow-up with GI, patient has noted black stools,  she does take iron therapy. Stools are not formed patient states. Patient states that she had EGD and colonoscopy last year in [place] , she wonders if she needs a CT of the abdomen.      Vitamin D deficiency: She takes 50,000 international units once/weekly.     JOANNE: She takes oral iron supplementation 5 days per week.       GERD: She patient uses omeprazole.  She has a history of H. Pylori, she did have a GI evaluation completed in [place].      Back pain: this is a chronic issue, back pain is much less tolerable now, uses acetaminophen, multiple OTC meds have not been effective. TENs unit helps a little, she goes to chiropractor. Pain is worse when walking. Patient has had back pain for 30 years, it seems to be worsening. Patient notes that back pain is not as much of an issue when she does not walk. Denies loss of bladder or bowel control, x rays of the back were completed.    ROS otherwise negative aside from what was mentioned above in HPI.      Patient Active Problem List   Diagnosis    Abdominal pain, acute, periumbilical    Acute abdominal pain in left lower quadrant    Anemia    Bilateral edema of lower extremity    Bilateral hearing loss    Breast asymmetry in female    Cat bite of right hand    Contact dermatitis    Cyst of right kidney    Diverticulosis of colon    Dysuria    Hair loss    Hepatic steatosis    History of cervical  cancer    Hyperlipemia, mixed    Hypothyroidism    Iron deficiency anemia    Nodule of lower lobe of right lung    Nodule of middle lobe of right lung    Morbid obesity (Multi)    Other microscopic hematuria    Peripheral arterial disease (CMS-HCC)    Postmenopausal bone loss    Varicose veins of right lower extremity    Ventral hernia    Vitamin D deficiency    Weight gain    Helicobacter pylori gastritis    Routine general medical examination at health care facility    Severe low back pain    Edema of lower extremity    History of elevated lipids    Double thoracic curve idiopathic scoliosis    Right knee injury, sequela    Osteoarthritis of right knee    Right knee pain         Past Medical History:   Diagnosis Date    Personal history of malignant neoplasm of breast     History of malignant neoplasm of breast    Personal history of other endocrine, nutritional and metabolic disease     History of hyperlipidemia     Past Surgical History:   Procedure Laterality Date    CATARACT EXTRACTION Bilateral 2023    OTHER SURGICAL HISTORY  09/16/2019    Cholecystectomy    OTHER SURGICAL HISTORY  09/16/2019    Right mastectomy    OTHER SURGICAL HISTORY  09/16/2019    Appendectomy    OTHER SURGICAL HISTORY  09/16/2019    Tonsillectomy    OTHER SURGICAL HISTORY  09/16/2019    Rhinoplasty    OTHER SURGICAL HISTORY  11/15/2019    Vascular surgical procedure     Social History     Social History Narrative    Not on file         ALLERGIES  Latex and Penicillins      MEDICATIONS  Current Outpatient Medications on File Prior to Visit   Medication Sig Dispense Refill    omeprazole OTC (PriLOSEC OTC) 20 mg EC tablet Take 1 tablet (20 mg) by mouth twice a day for 14 days, then take daily for 30 days more. 58 tablet 0    [DISCONTINUED] clopidogrel (Plavix) 75 mg tablet Take 1 tablet (75 mg) by mouth once daily. 90 tablet 1    [DISCONTINUED] ergocalciferol (Vitamin D-2) 1.25 MG (75359 UT) capsule Take 1 capsule (1,250 mcg) by mouth 1  "(one) time per week. 12 capsule 1    [DISCONTINUED] ferrous sulfate, 325 mg ferrous sulfate, (iron) tablet Take 1 tablet by mouth once daily (M-F). 90 tablet 1     No current facility-administered medications on file prior to visit.         PHYSICAL EXAM  /88 (BP Location: Left arm, Patient Position: Sitting, BP Cuff Size: Large adult)   Pulse 90   Temp 36.5 °C (97.7 °F) (Temporal)   Resp 16   Ht 1.575 m (5' 2\")   Wt 97.1 kg (214 lb)   SpO2 99%   BMI 39.14 kg/m²   Body mass index is 39.14 kg/m².  Constitutional:       Appearance: Normal appearance. She is obese. She is not ill-appearing, sitting in chair.   HENT:      Head: Normocephalic and atraumatic. No thyromegaly or neck masses  Eyes:      Extraocular Movements: Extraocular movements intact.      Conjunctiva/sclera: Conjunctivae normal.   Cardiovascular:      Rate and Rhythm: Normal rate and regular rhythm.      Pulses: Normal pulses.      Heart sounds: Normal heart sounds. No carotid bruits, no peripheral edema  Pulmonary:      Effort: Pulmonary effort is normal.      Comments: Diminished breath sounds throughout all fields, especially in the bases bilaterally  Abdominal:      General: Bowel sounds are normal. No tenderness is noted      Comments: Abdomen is obese   Musculoskeletal:         General: tenderness is noted over the right medial knee  Skin:     General: Skin is warm and dry. No lesions on exposed skin  Neurological:      General: No focal deficit present.      Mental Status: She is alert and oriented to person, place, and time.   Psychiatric:         Mood and Affect: Mood normal.         Behavior: Behavior normal.     ASSESSMENT/PLAN  Problem List Items Addressed This Visit       Hyperlipemia, mixed - Primary    Current Assessment & Plan     Most recent LDL was 108, given that the patient has a history of PVD, will initiate statin therapy. Recheck lab in 6 months         Relevant Medications    rosuvastatin (Crestor) 5 mg tablet    " Other Relevant Orders    Albumin , Urine Random    CBC and Auto Differential    Comprehensive Metabolic Panel    Lipid Panel    Hypothyroidism    Current Assessment & Plan     Monitor as ordered         Relevant Orders    Albumin , Urine Random    CBC and Auto Differential    Comprehensive Metabolic Panel    TSH with reflex to Free T4 if abnormal    Iron deficiency anemia    Current Assessment & Plan     Patient has black stools, she had EGD and colonoscopy last year in [place]. Patient may benefit from capsule endoscopy, further follow up.  Refer to GI locally for follow up exam          Relevant Medications    ferrous sulfate, 325 mg ferrous sulfate, (iron) tablet    Other Relevant Orders    Albumin , Urine Random    CBC and Auto Differential    Comprehensive Metabolic Panel    Referral to Gastroenterology    Morbid obesity (Multi)    Relevant Orders    Albumin , Urine Random    CBC and Auto Differential    Comprehensive Metabolic Panel    Peripheral arterial disease (CMS-HCC)    Current Assessment & Plan     Post stent placement, still takes clopidogrel         Relevant Medications    clopidogrel (Plavix) 75 mg tablet    Other Relevant Orders    Albumin , Urine Random    CBC and Auto Differential    Comprehensive Metabolic Panel    Vitamin D deficiency    Current Assessment & Plan     Monitor labs         Relevant Medications    ergocalciferol (Vitamin D-2) 1.25 MG (83429 UT) capsule (Start on 6/16/2024)    Other Relevant Orders    Albumin , Urine Random    CBC and Auto Differential    Comprehensive Metabolic Panel    Vitamin D 25-Hydroxy,Total (for eval of Vitamin D levels)    Osteoarthritis of right knee    Current Assessment & Plan     To follow up with Dr Galvan as scheduled         Relevant Orders    Albumin , Urine Random    CBC and Auto Differential    Comprehensive Metabolic Panel     Check labs, refer to GI for assessment, will start low dose rosuvastatin to decrease the LDL    Edward F  MD [facility]

## 2024-06-12 NOTE — ASSESSMENT & PLAN NOTE
Most recent LDL was 108, given that the patient has a history of PVD, will initiate statin therapy. Recheck lab in 6 months

## 2024-06-12 NOTE — ASSESSMENT & PLAN NOTE
Patient has black stools, she had EGD and colonoscopy last year in [place]. Patient may benefit from capsule endoscopy, further follow up.  Refer to GI locally for follow up exam

## 2024-07-23 ENCOUNTER — TELEPHONE (OUTPATIENT)
Dept: ORTHOPEDIC SURGERY | Facility: CLINIC | Age: 74
End: 2024-07-23
Payer: MEDICARE

## 2024-08-05 ASSESSMENT — ENCOUNTER SYMPTOMS: KNEE SWELLING: 1

## 2024-08-05 NOTE — PROGRESS NOTES
Subjective    Patient ID: Kayleen Rincon is a 74 y.o. female.    Chief Complaint: Pain of the Right Knee (Durolane injection Lot: [id number], Exp: 11/30/26)     Last Surgery: No surgery found  Last Surgery Date: No surgery found    Kayleen Gallegos is an extremely pleasant 74-year-old female coming in with some acute on chronic right knee pain that has been getting worse over the past 3 weeks.  The location of her pain is mostly anterior but she does state it is diffusely involving her entire right knee.  She does have some swelling and says that today her pain is 7 out of 10.  She went to urgent care on 4/11/2024 and had x-rays done.  She was told that she may have a near sprain.  She is not currently taking any medications telling me that she does not want to mask the pain.  She was ultimately referred here by her PCP Dr. Triplett for nonsurgical management of her right knee pain.  She denies any trauma.  No infectious symptoms.  No other complaints here today. We agreed for her to begin using Tylenol at prescription dosing along with Voltaren 3 times daily.  We also performed a right knee cortisone injection under ultrasound guidance.  The patient tolerated the procedure well without any complications and activity modifications were reviewed.  She will follow-up with me in about 4 weeks to determine her response to this plan.    Update on 5/21/2024.  Kayleen Gallegos is coming back in for a follow-up visit for her acute on chronic right knee pain.  Since her injection on 4/23/2024 she has experienced some relief but is still having a significant amount of pain and discomfort with trouble sleeping at night.  Overall she thinks she is about 50% better.  Her pain is still mostly over the medial joint line.  Moderate in severity.  Worse with activity. We agreed for her to continue Voltaren and prescription dose Tylenol 3 times daily for her pain.  In the meantime, we will begin the approval process for viscosupplementation  injections and we will follow-up her once the medication arrives.      Update on 8/6/2024.  Ms. Rincon is coming back in today for her right knee Durolane injection which is patient provided.  She still having pain but has no change in her symptoms since her last visit here.  No trauma.  No infectious symptoms.  No other complaints or today.    Right Knee         Objective   Right Knee Exam     Muscle Strength   The patient has normal right knee strength.    Tenderness   The patient is experiencing tenderness in the medial joint line.    Range of Motion   Extension:  normal   Flexion:  abnormal     Tests   Erasto:  Medial - positive   Varus: negative Valgus: negative  Lachman:  Anterior - negative      Drawer:  Anterior - negative    Posterior - negative  Patellar apprehension: negative    Other   Erythema: absent  Sensation: normal  Pulse: present  Swelling: moderate  Effusion: no effusion present    Comments:  No joint effusion      Left Knee Exam   Left knee exam is normal.            Image Results:  Point of Care Ultrasound  These images are not reportable by radiology and will not be interpreted   by  Radiologists.    No new imaging today    Patient ID: Kayleen Rincon is a 74 y.o. female.    L Inj/Asp: R knee on 8/6/2024 2:28 PM  Indications: pain  Details: 22 G needle, ultrasound-guided superolateral approach  Medications: 60 mg sodium hyaluronate 60 mg/3 mL  Outcome: tolerated well, no immediate complications  Procedure, treatment alternatives, risks and benefits explained, specific risks discussed. Consent was given by the patient. Immediately prior to procedure a time out was called to verify the correct patient, procedure, equipment, support staff and site/side marked as required. Patient was prepped and draped in the usual sterile fashion.           Assessment/Plan   Encounter Diagnoses:  Primary osteoarthritis of right knee    Orders Placed This Encounter    L Inj/Asp    Point of Care Ultrasound     No  follow-ups on file.    We discussed her treatment options and agreed to perform the right knee Dorleen injection here today in the office under ultrasound guidance.  Patient tolerated the procedure well without any complications and activity modifications were reviewed.  I will follow-up with her as needed moving forward and we could potentially repeat these gel injections as often as every 6 months.    Of note, we prayed for her  today who recently broke his hip.    ** Please excuse any errors in grammar or translation related to this dictation. Voice recognition software was utilized to prepare this document. **       Eligio Galvan MD  [facility]

## 2024-08-06 ENCOUNTER — HOSPITAL ENCOUNTER (OUTPATIENT)
Dept: RADIOLOGY | Facility: EXTERNAL LOCATION | Age: 74
Discharge: HOME | End: 2024-08-06

## 2024-08-06 ENCOUNTER — APPOINTMENT (OUTPATIENT)
Dept: ORTHOPEDIC SURGERY | Facility: CLINIC | Age: 74
End: 2024-08-06
Payer: MEDICARE

## 2024-08-06 VITALS — BODY MASS INDEX: 39.38 KG/M2 | HEIGHT: 62 IN | WEIGHT: 214 LBS

## 2024-08-06 DIAGNOSIS — M17.11 PRIMARY OSTEOARTHRITIS OF RIGHT KNEE: Primary | ICD-10-CM

## 2024-08-06 PROCEDURE — 1159F MED LIST DOCD IN RCRD: CPT | Performed by: EMERGENCY MEDICINE

## 2024-08-06 PROCEDURE — 1157F ADVNC CARE PLAN IN RCRD: CPT | Performed by: EMERGENCY MEDICINE

## 2024-08-06 PROCEDURE — 1123F ACP DISCUSS/DSCN MKR DOCD: CPT | Performed by: EMERGENCY MEDICINE

## 2024-08-06 PROCEDURE — 20611 DRAIN/INJ JOINT/BURSA W/US: CPT | Performed by: EMERGENCY MEDICINE

## 2024-08-21 ENCOUNTER — APPOINTMENT (OUTPATIENT)
Dept: GASTROENTEROLOGY | Facility: CLINIC | Age: 74
End: 2024-08-21
Payer: MEDICARE

## 2024-08-22 ENCOUNTER — TELEPHONE (OUTPATIENT)
Dept: PRIMARY CARE | Facility: CLINIC | Age: 74
End: 2024-08-22
Payer: MEDICARE

## 2024-08-22 NOTE — TELEPHONE ENCOUNTER
[facility] called in stating that the patient has an abscess on the lower tooth. [facility] wanted to know if the patient could have her tooth pulled today due to her pain or if she needs to stop her plavix before having her tooth pulled. [facility] would like a letter faxed over stating instructions. Their fax number is [phone]. Please advise.

## 2024-10-28 ASSESSMENT — ENCOUNTER SYMPTOMS: KNEE SWELLING: 1

## 2024-10-29 ENCOUNTER — HOSPITAL ENCOUNTER (OUTPATIENT)
Dept: RADIOLOGY | Facility: CLINIC | Age: 74
Discharge: HOME | End: 2024-10-29
Payer: MEDICARE

## 2024-10-29 ENCOUNTER — HOSPITAL ENCOUNTER (OUTPATIENT)
Dept: RADIOLOGY | Facility: EXTERNAL LOCATION | Age: 74
Discharge: HOME | End: 2024-10-29

## 2024-10-29 ENCOUNTER — APPOINTMENT (OUTPATIENT)
Dept: ORTHOPEDIC SURGERY | Facility: CLINIC | Age: 74
End: 2024-10-29
Payer: MEDICARE

## 2024-10-29 VITALS — WEIGHT: 214 LBS | BODY MASS INDEX: 39.38 KG/M2 | HEIGHT: 62 IN

## 2024-10-29 DIAGNOSIS — M17.12 PRIMARY OSTEOARTHRITIS OF LEFT KNEE: Primary | ICD-10-CM

## 2024-10-29 DIAGNOSIS — M25.562 LEFT KNEE PAIN, UNSPECIFIED CHRONICITY: ICD-10-CM

## 2024-10-29 PROCEDURE — 73564 X-RAY EXAM KNEE 4 OR MORE: CPT | Mod: LT

## 2024-10-29 PROCEDURE — 99214 OFFICE O/P EST MOD 30 MIN: CPT | Performed by: EMERGENCY MEDICINE

## 2024-10-29 PROCEDURE — 20611 DRAIN/INJ JOINT/BURSA W/US: CPT | Performed by: EMERGENCY MEDICINE

## 2024-10-29 PROCEDURE — 1123F ACP DISCUSS/DSCN MKR DOCD: CPT | Performed by: EMERGENCY MEDICINE

## 2024-10-29 PROCEDURE — 1157F ADVNC CARE PLAN IN RCRD: CPT | Performed by: EMERGENCY MEDICINE

## 2024-10-29 RX ORDER — METHYLPREDNISOLONE ACETATE 40 MG/ML
80 INJECTION, SUSPENSION INTRA-ARTICULAR; INTRALESIONAL; INTRAMUSCULAR; SOFT TISSUE
Status: COMPLETED | OUTPATIENT
Start: 2024-10-29 | End: 2024-10-29

## 2024-10-29 RX ORDER — LIDOCAINE HYDROCHLORIDE 10 MG/ML
4 INJECTION, SOLUTION INFILTRATION; PERINEURAL
Status: COMPLETED | OUTPATIENT
Start: 2024-10-29 | End: 2024-10-29

## 2024-10-30 ENCOUNTER — LAB (OUTPATIENT)
Dept: LAB | Facility: LAB | Age: 74
End: 2024-10-30
Payer: MEDICARE

## 2024-10-30 ENCOUNTER — APPOINTMENT (OUTPATIENT)
Dept: GASTROENTEROLOGY | Facility: CLINIC | Age: 74
End: 2024-10-30
Payer: MEDICARE

## 2024-10-30 VITALS
HEIGHT: 62 IN | DIASTOLIC BLOOD PRESSURE: 88 MMHG | WEIGHT: 208 LBS | BODY MASS INDEX: 38.28 KG/M2 | SYSTOLIC BLOOD PRESSURE: 119 MMHG | HEART RATE: 114 BPM

## 2024-10-30 DIAGNOSIS — R19.7 DIARRHEA IN ADULT PATIENT: ICD-10-CM

## 2024-10-30 DIAGNOSIS — K52.9 CHRONIC DIARRHEA: ICD-10-CM

## 2024-10-30 DIAGNOSIS — D50.9 IRON DEFICIENCY ANEMIA, UNSPECIFIED IRON DEFICIENCY ANEMIA TYPE: ICD-10-CM

## 2024-10-30 DIAGNOSIS — K52.9 CHRONIC DIARRHEA: Primary | ICD-10-CM

## 2024-10-30 LAB
ALBUMIN SERPL BCP-MCNC: 4.7 G/DL (ref 3.4–5)
ALP SERPL-CCNC: 104 U/L (ref 33–136)
ALT SERPL W P-5'-P-CCNC: 14 U/L (ref 7–45)
ANION GAP SERPL CALC-SCNC: 14 MMOL/L (ref 10–20)
AST SERPL W P-5'-P-CCNC: 13 U/L (ref 9–39)
BILIRUB SERPL-MCNC: 0.5 MG/DL (ref 0–1.2)
BUN SERPL-MCNC: 18 MG/DL (ref 6–23)
CALCIUM SERPL-MCNC: 9.9 MG/DL (ref 8.6–10.3)
CHLORIDE SERPL-SCNC: 102 MMOL/L (ref 98–107)
CO2 SERPL-SCNC: 27 MMOL/L (ref 21–32)
CREAT SERPL-MCNC: 0.78 MG/DL (ref 0.5–1.05)
EGFRCR SERPLBLD CKD-EPI 2021: 79 ML/MIN/1.73M*2
ERYTHROCYTE [DISTWIDTH] IN BLOOD BY AUTOMATED COUNT: 13 % (ref 11.5–14.5)
GLUCOSE SERPL-MCNC: 100 MG/DL (ref 74–99)
HCT VFR BLD AUTO: 44.9 % (ref 36–46)
HGB BLD-MCNC: 14.6 G/DL (ref 12–16)
IRON SATN MFR SERPL: 26 % (ref 25–45)
IRON SERPL-MCNC: 104 UG/DL (ref 35–150)
MCH RBC QN AUTO: 29.4 PG (ref 26–34)
MCHC RBC AUTO-ENTMCNC: 32.5 G/DL (ref 32–36)
MCV RBC AUTO: 91 FL (ref 80–100)
NRBC BLD-RTO: 0 /100 WBCS (ref 0–0)
PLATELET # BLD AUTO: 361 X10*3/UL (ref 150–450)
POTASSIUM SERPL-SCNC: 4.6 MMOL/L (ref 3.5–5.3)
PROT SERPL-MCNC: 7.4 G/DL (ref 6.4–8.2)
RBC # BLD AUTO: 4.96 X10*6/UL (ref 4–5.2)
SODIUM SERPL-SCNC: 138 MMOL/L (ref 136–145)
TIBC SERPL-MCNC: 401 UG/DL (ref 240–445)
TSH SERPL-ACNC: 0.74 MIU/L (ref 0.44–3.98)
UIBC SERPL-MCNC: 297 UG/DL (ref 110–370)
WBC # BLD AUTO: 11.4 X10*3/UL (ref 4.4–11.3)

## 2024-10-30 PROCEDURE — 85027 COMPLETE CBC AUTOMATED: CPT

## 2024-10-30 PROCEDURE — 99204 OFFICE O/P NEW MOD 45 MIN: CPT | Performed by: NURSE PRACTITIONER

## 2024-10-30 PROCEDURE — 1123F ACP DISCUSS/DSCN MKR DOCD: CPT | Performed by: NURSE PRACTITIONER

## 2024-10-30 PROCEDURE — 36415 COLL VENOUS BLD VENIPUNCTURE: CPT

## 2024-10-30 PROCEDURE — 1157F ADVNC CARE PLAN IN RCRD: CPT | Performed by: NURSE PRACTITIONER

## 2024-10-30 PROCEDURE — 1159F MED LIST DOCD IN RCRD: CPT | Performed by: NURSE PRACTITIONER

## 2024-10-30 PROCEDURE — 83516 IMMUNOASSAY NONANTIBODY: CPT

## 2024-10-30 PROCEDURE — 84443 ASSAY THYROID STIM HORMONE: CPT

## 2024-10-30 PROCEDURE — 83540 ASSAY OF IRON: CPT

## 2024-10-30 PROCEDURE — 83550 IRON BINDING TEST: CPT

## 2024-10-30 PROCEDURE — 80053 COMPREHEN METABOLIC PANEL: CPT

## 2024-10-30 ASSESSMENT — ENCOUNTER SYMPTOMS
JOINT SWELLING: 0
SHORTNESS OF BREATH: 0
ROS GI COMMENTS: SEE HPI
CHILLS: 0
DIFFICULTY URINATING: 0
COUGH: 0
SORE THROAT: 0
ADENOPATHY: 0
BRUISES/BLEEDS EASILY: 0
PALPITATIONS: 0
TROUBLE SWALLOWING: 0
WEAKNESS: 0
DIZZINESS: 0
ARTHRALGIAS: 0
FEVER: 0
WOUND: 0
CONFUSION: 0

## 2024-10-31 LAB — TTG IGA SER IA-ACNC: <1 U/ML

## 2024-11-01 ENCOUNTER — LAB (OUTPATIENT)
Dept: LAB | Facility: LAB | Age: 74
End: 2024-11-01
Payer: MEDICARE

## 2024-11-01 DIAGNOSIS — K52.9 CHRONIC DIARRHEA: ICD-10-CM

## 2024-11-01 PROCEDURE — 83993 ASSAY FOR CALPROTECTIN FECAL: CPT

## 2024-11-01 PROCEDURE — 82653 EL-1 FECAL QUANTITATIVE: CPT

## 2024-11-06 LAB
CALPROTECTIN STL-MCNT: 41 UG/G
ELASTASE PANC STL-MCNT: 770 UG/G

## 2024-11-11 NOTE — PROGRESS NOTES
PRIMARY CARE PHYSICIAN: Kailash Triplett MD  REFERRING PROVIDER: Eligio Galvan MD     CONSULT ORTHOPAEDIC: Knee Evaluation        ASSESSMENT & PLAN    IMPRESSION:   Primary osteoarthritis, right knee    PLAN: Patient is a known history of osteoarthritis of right knee.  She is failed conservative treatment up to this point.  She feels that her quality life is significant affected and wants to discuss surgical intervention.    Kayleen Rincon has radiographic and physical exam evidence of degenerative joint disease and wishes to pursue surgery. The patient appears to have sufficient symptoms to warrant surgical intervention and is an appropriate candidate for  RIGHT Total Knee Arthroplasty as evidenced by six months of unsuccessful non-operative treatment as outlined in the HPI, progressive symptoms including pain impacting sleep or causing fatigue, pain impacting work, pain worsened with weight bearing, and pain limiting ability to stay fit and healthy.     We had a lengthy discussion regarding the risk and benefit of surgery, the alternatives, limitations, and personnel involved. The include but are not limited to infection, persistent pain, instability, nerve injury, blood clots, and medical complications. We also discussed the pre-operative course, surgery itself, and rehabilitation. Perioperative blood management and transfusion issues were discussed, and options clearly outlined. The patient consented to the use of allogenic blood if medically necessary.     The patient has elected to schedule surgery at this time or intents to call the office with a surgical date. Shared decision making occurred while obtaining informed consent. The patient with be scheduled for a pre-operative education class. The patient will be ordered appropriate preoperative labs to be completed for preadmission testing.     Robotic Assisted Surgery: No    - Preoperative Consults: PAT Clearance  - Disposition: rapid recovery  -  Anesthesia Plan: Preoperative block, spinal, local  - Implants: [place] CS  - Physical Therapy Plan: Home  - [id number]: Yes  - Surgical Approach: Standard  - DVT PPx: Resume home dose of Plavix and postoperative day 1    Risk Assessment for Post-Op Antibiotics   - BMI >35: yes  - Chronic Anticoagulation Use: yes    I hereby indicate that these comorbidities may have a detrimental effect on this arthroplasty.   - Chronic anticoagulant use [Z79.01 Long-term (current) use of anticoagulants]          SUBJECTIVE  CHIEF COMPLAINT:   Chief Complaint   Patient presents with    Right Knee - Pain        HPI: Kayleen iRncon is a 74 y.o. patient. Kayleen Rincon has had progressive problems with theirright knee over the past 1 year. They do not report any trauma. They do not report any constant or progressive numbness or tingling in their legs. Their symptoms are interfering with activities which include pain in the knee that travels down the tibia. XR done today. Feels that she is no longer able to stay healthy and walk for exercise due to the pain.     FUNCTIONAL STATUS: occasionally limited.  AMBULATORY STATUS:  independent  PREVIOUS TREATMENTS: Cortisone injection R KNEE 4/2024 with no improvement  Other injection R KNEE DUROLANE 8/6/2024 with mild improvement. Unable to take NSAIDs due to being on plavix for stent in left leg.   HISTORY OF SURGERY ON AFFECTED KNEE(S): No       REVIEW OF SYSTEMS  Constitutional: See HPI for pain assessment, No significant weight loss, recent trauma  Cardiovascular: No chest pain, shortness of breath  Respiratory: No difficulty breathing, cough  Gastrointestinal: No nausea, vomiting, diarrhea, constipation  Musculoskeletal: Noted in HPI, positive for pain, restricted motion, stiffness  Integumentary: No rashes, easy bruising, redness   Neurological: no numbness or tingling in extremities, no gait disturbances   Psychiatric: No mood changes, memory changes, social issues  Heme/Lymph: no  excessive swelling, easy bruising, excessive bleeding  ENT: No hearing changes  Eyes: No vision changes    Past Medical History:   Diagnosis Date    Personal history of malignant neoplasm of breast     History of malignant neoplasm of breast    Personal history of other endocrine, nutritional and metabolic disease     History of hyperlipidemia        Allergies   Allergen Reactions    Latex Anaphylaxis    Penicillins Unknown        Past Surgical History:   Procedure Laterality Date    CATARACT EXTRACTION Bilateral 2023    OTHER SURGICAL HISTORY  09/16/2019    Cholecystectomy    OTHER SURGICAL HISTORY  09/16/2019    Right mastectomy    OTHER SURGICAL HISTORY  09/16/2019    Appendectomy    OTHER SURGICAL HISTORY  09/16/2019    Tonsillectomy    OTHER SURGICAL HISTORY  09/16/2019    Rhinoplasty    OTHER SURGICAL HISTORY  11/15/2019    Vascular surgical procedure        Family History   Problem Relation Name Age of Onset    Cancer Other          aunt        Social History     Socioeconomic History    Marital status:      Spouse name: Not on file    Number of children: Not on file    Years of education: Not on file    Highest education level: Not on file   Occupational History    Not on file   Tobacco Use    Smoking status: Never     Passive exposure: Never    Smokeless tobacco: Never   Vaping Use    Vaping status: Never Used   Substance and Sexual Activity    Alcohol use: Never    Drug use: Never    Sexual activity: Defer   Other Topics Concern    Not on file   Social History Narrative    Not on file     Social Drivers of Health     Financial Resource Strain: Not on file   Food Insecurity: No Food Insecurity (6/12/2024)    Hunger Vital Sign     Worried About Running Out of Food in the Last Year: Never true     Ran Out of Food in the Last Year: Never true   Transportation Needs: Not on file   Physical Activity: Not on file   Stress: Not on file   Social Connections: Not on file   Intimate Partner Violence: Not on  "file   Housing Stability: Not on file        CURRENT MEDICATIONS:   Current Outpatient Medications   Medication Sig Dispense Refill    clopidogrel (Plavix) 75 mg tablet Take 1 tablet (75 mg) by mouth once daily. 90 tablet 1    ergocalciferol (Vitamin D-2) 1.25 MG (33408 UT) capsule Take 1 capsule (1,250 mcg) by mouth 1 (one) time per week. 12 capsule 1    ferrous sulfate, 325 mg ferrous sulfate, (iron) tablet Take 1 tablet by mouth once daily (M-F). 90 tablet 1    omeprazole OTC (PriLOSEC OTC) 20 mg EC tablet Take 1 tablet (20 mg) by mouth twice a day for 14 days, then take daily for 30 days more. 58 tablet 0    rosuvastatin (Crestor) 5 mg tablet Take 1 tablet (5 mg) by mouth once daily. (Patient not taking: Reported on 10/30/2024) 90 tablet 1     No current facility-administered medications for this visit.        OBJECTIVE    PHYSICAL EXAM      4/11/2024     1:59 PM 4/23/2024    11:06 AM 5/21/2024    11:28 AM 6/12/2024     1:05 PM 8/6/2024     2:10 PM 10/29/2024    11:19 AM 10/30/2024     2:52 PM   Vitals   Systolic 141   141   119   Diastolic 84   88   88   Heart Rate 87   90   114   Temp 36.3 °C (97.4 °F)   36.5 °C (97.7 °F)      Resp 18   16      Height (in)  1.575 m (5' 2\") 1.575 m (5' 2\") 1.575 m (5' 2\") 1.575 m (5' 2\") 1.575 m (5' 2\") 1.575 m (5' 2\")   Weight (lb)  213 213 214 214 214 208   BMI  38.96 kg/m2 38.96 kg/m2 39.14 kg/m2 39.14 kg/m2 39.14 kg/m2 38.04 kg/m2   BSA (m2)  2.06 m2 2.06 m2 2.06 m2 2.06 m2 2.06 m2 2.03 m2   Visit Report  Report Report Report Report Report Report      There is no height or weight on file to calculate BMI.    GENERAL: A/Ox3, NAD. Appears healthy, well nourished  PSYCHIATRIC: Mood stable, appropriate memory recall  EYES: EOM intact, no scleral icterus  CARDIAC: regular rate  LUNGS: Breathing non-labored  SKIN: no erythema, rashes, or ecchymoses     MUSCULOSKELETAL:  Laterality: right Knee Exam  - Alignment: partially correctible varus deformity  - ROM:  5 - 120   - Effusion: " none  - Strength: knee extension and flexion 5/5, EHL/PF/DF motor intact  - Palpation: TTP along  medial and lateral  joint line  - Stability: Anterior/Posterior stable, varus/valgus stable  - Gait: normal  - Hip Exam: flexion to 100+ degrees, full extension, internal/external rotation adequate, and no pain with log roll  - Special Tests: none performed    NEUROVASCULAR:  - Neurovascular Status: sensation intact to light touch distally  - Capillary refill brisk at extremities, Bilateral dorsalis pedis pulse 2+     IMAGING:  Multiple views of the affected right knee(s) demonstrate: severe osteoarthrits along medial compartment with complete joint space narrowing.   X-rays were personally reviewed and interpreted by me.  Radiology reports were reviewed by me as well, if readily available at the time.        Lazara Liu DO  Attending Surgeon  Joint Replacement and Adult Reconstructive Surgery  [place], [place]

## 2024-11-12 ENCOUNTER — PREP FOR PROCEDURE (OUTPATIENT)
Dept: ORTHOPEDIC SURGERY | Facility: CLINIC | Age: 74
End: 2024-11-12

## 2024-11-12 ENCOUNTER — APPOINTMENT (OUTPATIENT)
Dept: ORTHOPEDIC SURGERY | Facility: CLINIC | Age: 74
End: 2024-11-12
Payer: MEDICARE

## 2024-11-12 ENCOUNTER — HOSPITAL ENCOUNTER (OUTPATIENT)
Dept: RADIOLOGY | Facility: CLINIC | Age: 74
Discharge: HOME | End: 2024-11-12
Payer: MEDICARE

## 2024-11-12 VITALS — HEIGHT: 62 IN | WEIGHT: 208 LBS | BODY MASS INDEX: 38.28 KG/M2

## 2024-11-12 DIAGNOSIS — M17.11 PRIMARY OSTEOARTHRITIS OF RIGHT KNEE: ICD-10-CM

## 2024-11-12 PROCEDURE — 1123F ACP DISCUSS/DSCN MKR DOCD: CPT | Performed by: ORTHOPAEDIC SURGERY

## 2024-11-12 PROCEDURE — 1036F TOBACCO NON-USER: CPT | Performed by: ORTHOPAEDIC SURGERY

## 2024-11-12 PROCEDURE — 99204 OFFICE O/P NEW MOD 45 MIN: CPT | Performed by: ORTHOPAEDIC SURGERY

## 2024-11-12 PROCEDURE — 73560 X-RAY EXAM OF KNEE 1 OR 2: CPT | Mod: RT

## 2024-11-12 PROCEDURE — 1157F ADVNC CARE PLAN IN RCRD: CPT | Performed by: ORTHOPAEDIC SURGERY

## 2024-11-12 PROCEDURE — 1125F AMNT PAIN NOTED PAIN PRSNT: CPT | Performed by: ORTHOPAEDIC SURGERY

## 2024-11-12 PROCEDURE — 73560 X-RAY EXAM OF KNEE 1 OR 2: CPT | Mod: RIGHT SIDE | Performed by: RADIOLOGY

## 2024-11-12 PROCEDURE — 1159F MED LIST DOCD IN RCRD: CPT | Performed by: ORTHOPAEDIC SURGERY

## 2024-11-12 ASSESSMENT — PAIN - FUNCTIONAL ASSESSMENT: PAIN_FUNCTIONAL_ASSESSMENT: 0-10

## 2024-11-12 ASSESSMENT — PAIN SCALES - GENERAL: PAINLEVEL_OUTOF10: 4

## 2024-11-12 NOTE — LETTER
November 12, 2024     Kailahs Triplett MD  [address]  [address]  [facility] [address]    Patient: Becky Rincon   YOB: 1950   Date of Visit: 11/12/2024       Dear Dr. Kailash Triplett MD:    Thank you for referring Becky Rincon to me for evaluation. Below are my notes for this consultation.  If you have questions, please do not hesitate to call me. I look forward to following your patient along with you.       Sincerely,     Lazara Liu, DO      CC: No Recipients  ______________________________________________________________________________________    PRIMARY CARE PHYSICIAN: Kailash Triplett MD  REFERRING PROVIDER: Eligio Galvan MD     CONSULT ORTHOPAEDIC: Knee Evaluation        ASSESSMENT & PLAN    IMPRESSION:   Primary osteoarthritis, right knee    PLAN: Patient is a known history of osteoarthritis of right knee.  She is failed conservative treatment up to this point.  She feels that her quality life is significant affected and wants to discuss surgical intervention.    Kayleen Rincon has radiographic and physical exam evidence of degenerative joint disease and wishes to pursue surgery. The patient appears to have sufficient symptoms to warrant surgical intervention and is an appropriate candidate for  RIGHT Total Knee Arthroplasty as evidenced by six months of unsuccessful non-operative treatment as outlined in the HPI, progressive symptoms including pain impacting sleep or causing fatigue, pain impacting work, pain worsened with weight bearing, and pain limiting ability to stay fit and healthy.     We had a lengthy discussion regarding the risk and benefit of surgery, the alternatives, limitations, and personnel involved. The include but are not limited to infection, persistent pain, instability, nerve injury, blood clots, and medical complications. We also discussed the pre-operative course, surgery itself, and rehabilitation. Perioperative blood management and transfusion issues were  discussed, and options clearly outlined. The patient consented to the use of allogenic blood if medically necessary.     The patient has elected to schedule surgery at this time or intents to call the office with a surgical date. Shared decision making occurred while obtaining informed consent. The patient with be scheduled for a pre-operative education class. The patient will be ordered appropriate preoperative labs to be completed for preadmission testing.     Robotic Assisted Surgery: No    - Preoperative Consults: PAT Clearance  - Disposition: rapid recovery  - Anesthesia Plan: Preoperative block, spinal, local  - Implants: [employer] CS  - Physical Therapy Plan: Home  - [id number]: Yes  - Surgical Approach: Standard  - DVT PPx: Resume home dose of Plavix and postoperative day 1    Risk Assessment for Post-Op Antibiotics   - BMI >35: yes  - Chronic Anticoagulation Use: yes    I hereby indicate that these comorbidities may have a detrimental effect on this arthroplasty.   - Chronic anticoagulant use [Z79.01 Long-term (current) use of anticoagulants]          SUBJECTIVE  CHIEF COMPLAINT:   Chief Complaint   Patient presents with   • Right Knee - Pain        HPI: Kayleen Rincon is a 74 y.o. patient. Kayleen Rincon has had progressive problems with theirright knee over the past 1 year. They do not report any trauma. They do not report any constant or progressive numbness or tingling in their legs. Their symptoms are interfering with activities which include pain in the knee that travels down the tibia. XR done today. Feels that she is no longer able to stay healthy and walk for exercise due to the pain.     FUNCTIONAL STATUS: occasionally limited.  AMBULATORY STATUS:  independent  PREVIOUS TREATMENTS: Cortisone injection R KNEE 4/2024 with no improvement  Other injection R KNEE DUROLANE 8/6/2024 with mild improvement. Unable to take NSAIDs due to being on plavix for stent in left leg.   HISTORY OF SURGERY ON AFFECTED  KNEE(S): No       REVIEW OF SYSTEMS  Constitutional: See HPI for pain assessment, No significant weight loss, recent trauma  Cardiovascular: No chest pain, shortness of breath  Respiratory: No difficulty breathing, cough  Gastrointestinal: No nausea, vomiting, diarrhea, constipation  Musculoskeletal: Noted in HPI, positive for pain, restricted motion, stiffness  Integumentary: No rashes, easy bruising, redness   Neurological: no numbness or tingling in extremities, no gait disturbances   Psychiatric: No mood changes, memory changes, social issues  Heme/Lymph: no excessive swelling, easy bruising, excessive bleeding  ENT: No hearing changes  Eyes: No vision changes    Past Medical History:   Diagnosis Date   • Personal history of malignant neoplasm of breast     History of malignant neoplasm of breast   • Personal history of other endocrine, nutritional and metabolic disease     History of hyperlipidemia        Allergies   Allergen Reactions   • Latex Anaphylaxis   • Penicillins Unknown        Past Surgical History:   Procedure Laterality Date   • CATARACT EXTRACTION Bilateral 2023   • OTHER SURGICAL HISTORY  09/16/2019    Cholecystectomy   • OTHER SURGICAL HISTORY  09/16/2019    Right mastectomy   • OTHER SURGICAL HISTORY  09/16/2019    Appendectomy   • OTHER SURGICAL HISTORY  09/16/2019    Tonsillectomy   • OTHER SURGICAL HISTORY  09/16/2019    Rhinoplasty   • OTHER SURGICAL HISTORY  11/15/2019    Vascular surgical procedure        Family History   Problem Relation Name Age of Onset   • Cancer Other          aunt        Social History     Socioeconomic History   • Marital status:      Spouse name: Not on file   • Number of children: Not on file   • Years of education: Not on file   • Highest education level: Not on file   Occupational History   • Not on file   Tobacco Use   • Smoking status: Never     Passive exposure: Never   • Smokeless tobacco: Never   Vaping Use   • Vaping status: Never Used   Substance  "and Sexual Activity   • Alcohol use: Never   • Drug use: Never   • Sexual activity: Defer   Other Topics Concern   • Not on file   Social History Narrative   • Not on file     Social Drivers of Health     Financial Resource Strain: Not on file   Food Insecurity: No Food Insecurity (6/12/2024)    Hunger Vital Sign    • Worried About Running Out of Food in the Last Year: Never true    • Ran Out of Food in the Last Year: Never true   Transportation Needs: Not on file   Physical Activity: Not on file   Stress: Not on file   Social Connections: Not on file   Intimate Partner Violence: Not on file   Housing Stability: Not on file        CURRENT MEDICATIONS:   Current Outpatient Medications   Medication Sig Dispense Refill   • clopidogrel (Plavix) 75 mg tablet Take 1 tablet (75 mg) by mouth once daily. 90 tablet 1   • ergocalciferol (Vitamin D-2) 1.25 MG (68043 UT) capsule Take 1 capsule (1,250 mcg) by mouth 1 (one) time per week. 12 capsule 1   • ferrous sulfate, 325 mg ferrous sulfate, (iron) tablet Take 1 tablet by mouth once daily (M-F). 90 tablet 1   • omeprazole OTC (PriLOSEC OTC) 20 mg EC tablet Take 1 tablet (20 mg) by mouth twice a day for 14 days, then take daily for 30 days more. 58 tablet 0   • rosuvastatin (Crestor) 5 mg tablet Take 1 tablet (5 mg) by mouth once daily. (Patient not taking: Reported on 10/30/2024) 90 tablet 1     No current facility-administered medications for this visit.        OBJECTIVE    PHYSICAL EXAM      4/11/2024     1:59 PM 4/23/2024    11:06 AM 5/21/2024    11:28 AM 6/12/2024     1:05 PM 8/6/2024     2:10 PM 10/29/2024    11:19 AM 10/30/2024     2:52 PM   Vitals   Systolic 141   141   119   Diastolic 84   88   88   Heart Rate 87   90   114   Temp 36.3 °C (97.4 °F)   36.5 °C (97.7 °F)      Resp 18   16      Height (in)  1.575 m (5' 2\") 1.575 m (5' 2\") 1.575 m (5' 2\") 1.575 m (5' 2\") 1.575 m (5' 2\") 1.575 m (5' 2\")   Weight (lb)  213 213 214 214 214 208   BMI  38.96 kg/m2 38.96 kg/m2 " 39.14 kg/m2 39.14 kg/m2 39.14 kg/m2 38.04 kg/m2   BSA (m2)  2.06 m2 2.06 m2 2.06 m2 2.06 m2 2.06 m2 2.03 m2   Visit Report  Report Report Report Report Report Report      There is no height or weight on file to calculate BMI.    GENERAL: A/Ox3, NAD. Appears healthy, well nourished  PSYCHIATRIC: Mood stable, appropriate memory recall  EYES: EOM intact, no scleral icterus  CARDIAC: regular rate  LUNGS: Breathing non-labored  SKIN: no erythema, rashes, or ecchymoses     MUSCULOSKELETAL:  Laterality: right Knee Exam  - Alignment: partially correctible varus deformity  - ROM:  5 - 120   - Effusion: none  - Strength: knee extension and flexion 5/5, EHL/PF/DF motor intact  - Palpation: TTP along  medial and lateral  joint line  - Stability: Anterior/Posterior stable, varus/valgus stable  - Gait: normal  - Hip Exam: flexion to 100+ degrees, full extension, internal/external rotation adequate, and no pain with log roll  - Special Tests: none performed    NEUROVASCULAR:  - Neurovascular Status: sensation intact to light touch distally  - Capillary refill brisk at extremities, Bilateral dorsalis pedis pulse 2+     IMAGING:  Multiple views of the affected right knee(s) demonstrate: severe osteoarthrits along medial compartment with complete joint space narrowing.   X-rays were personally reviewed and interpreted by me.  Radiology reports were reviewed by me as well, if readily available at the time.        Lazara Liu DO  Attending Surgeon  Joint Replacement and Adult Reconstructive Surgery  [place], [place]

## 2024-11-20 ENCOUNTER — TELEPHONE (OUTPATIENT)
Dept: GASTROENTEROLOGY | Facility: CLINIC | Age: 74
End: 2024-11-20
Payer: MEDICARE

## 2024-11-20 NOTE — TELEPHONE ENCOUNTER
Attempted to call patient; left voicemail to return call.     Received records from [facility]. EGD 2 years ago showed a very tiny gastric ulcer and H. Pylori. Recent blood work showed no evidence of anemia. Black stools likely being caused by the iron supplement she is taking.     Recommend patient follow up in office.

## 2024-12-11 DIAGNOSIS — E55.9 VITAMIN D DEFICIENCY: ICD-10-CM

## 2024-12-11 RX ORDER — ERGOCALCIFEROL 1.25 MG/1
1 CAPSULE ORAL
Qty: 12 CAPSULE | Refills: 1 | Status: SHIPPED | OUTPATIENT
Start: 2024-12-15

## 2024-12-11 RX ORDER — HYALURONATE SODIUM, STABILIZED 60 MG/3 ML
SYRINGE (ML) INTRAARTICULAR
COMMUNITY
Start: 2024-07-02 | End: 2024-12-12 | Stop reason: WASHOUT

## 2024-12-12 ENCOUNTER — APPOINTMENT (OUTPATIENT)
Dept: PRIMARY CARE | Facility: CLINIC | Age: 74
End: 2024-12-12
Payer: MEDICARE

## 2024-12-12 VITALS
OXYGEN SATURATION: 99 % | SYSTOLIC BLOOD PRESSURE: 145 MMHG | DIASTOLIC BLOOD PRESSURE: 89 MMHG | HEIGHT: 62 IN | TEMPERATURE: 97.7 F | BODY MASS INDEX: 39.86 KG/M2 | WEIGHT: 216.6 LBS | HEART RATE: 75 BPM | RESPIRATION RATE: 16 BRPM

## 2024-12-12 DIAGNOSIS — E78.2 HYPERLIPEMIA, MIXED: Primary | ICD-10-CM

## 2024-12-12 DIAGNOSIS — K57.30 DIVERTICULOSIS OF COLON: ICD-10-CM

## 2024-12-12 DIAGNOSIS — Z85.41 HISTORY OF CERVICAL CANCER: ICD-10-CM

## 2024-12-12 DIAGNOSIS — M17.11 PRIMARY OSTEOARTHRITIS OF RIGHT KNEE: ICD-10-CM

## 2024-12-12 DIAGNOSIS — Z85.3 HISTORY OF BREAST CANCER: ICD-10-CM

## 2024-12-12 DIAGNOSIS — I73.9 PERIPHERAL ARTERIAL DISEASE (CMS-HCC): ICD-10-CM

## 2024-12-12 DIAGNOSIS — Z00.00 ROUTINE GENERAL MEDICAL EXAMINATION AT HEALTH CARE FACILITY: ICD-10-CM

## 2024-12-12 DIAGNOSIS — Z12.31 VISIT FOR SCREENING MAMMOGRAM: ICD-10-CM

## 2024-12-12 DIAGNOSIS — R01.1 SYSTOLIC MURMUR: ICD-10-CM

## 2024-12-12 PROCEDURE — 1157F ADVNC CARE PLAN IN RCRD: CPT | Performed by: INTERNAL MEDICINE

## 2024-12-12 PROCEDURE — G0439 PPPS, SUBSEQ VISIT: HCPCS | Performed by: INTERNAL MEDICINE

## 2024-12-12 PROCEDURE — 1159F MED LIST DOCD IN RCRD: CPT | Performed by: INTERNAL MEDICINE

## 2024-12-12 PROCEDURE — 1036F TOBACCO NON-USER: CPT | Performed by: INTERNAL MEDICINE

## 2024-12-12 PROCEDURE — 1123F ACP DISCUSS/DSCN MKR DOCD: CPT | Performed by: INTERNAL MEDICINE

## 2024-12-12 PROCEDURE — 99214 OFFICE O/P EST MOD 30 MIN: CPT | Performed by: INTERNAL MEDICINE

## 2024-12-12 PROCEDURE — 1160F RVW MEDS BY RX/DR IN RCRD: CPT | Performed by: INTERNAL MEDICINE

## 2024-12-12 PROCEDURE — 1170F FXNL STATUS ASSESSED: CPT | Performed by: INTERNAL MEDICINE

## 2024-12-12 RX ORDER — ASPIRIN 81 MG/1
81 TABLET ORAL DAILY
Qty: 100 TABLET | Refills: 3 | Status: SHIPPED | OUTPATIENT
Start: 2024-12-12 | End: 2025-12-12

## 2024-12-12 SDOH — ECONOMIC STABILITY: FOOD INSECURITY: WITHIN THE PAST 12 MONTHS, THE FOOD YOU BOUGHT JUST DIDN'T LAST AND YOU DIDN'T HAVE MONEY TO GET MORE.: NEVER TRUE

## 2024-12-12 SDOH — ECONOMIC STABILITY: FOOD INSECURITY: WITHIN THE PAST 12 MONTHS, YOU WORRIED THAT YOUR FOOD WOULD RUN OUT BEFORE YOU GOT MONEY TO BUY MORE.: NEVER TRUE

## 2024-12-12 ASSESSMENT — ACTIVITIES OF DAILY LIVING (ADL)
DRESSING: INDEPENDENT
TAKING_MEDICATION: INDEPENDENT
BATHING: INDEPENDENT
GROCERY_SHOPPING: INDEPENDENT
MANAGING_FINANCES: INDEPENDENT
DOING_HOUSEWORK: INDEPENDENT

## 2024-12-12 ASSESSMENT — LIFESTYLE VARIABLES
HOW OFTEN DO YOU HAVE A DRINK CONTAINING ALCOHOL: NEVER
AUDIT-C TOTAL SCORE: 0
HOW MANY STANDARD DRINKS CONTAINING ALCOHOL DO YOU HAVE ON A TYPICAL DAY: PATIENT DOES NOT DRINK
HOW OFTEN DO YOU HAVE SIX OR MORE DRINKS ON ONE OCCASION: NEVER
SKIP TO QUESTIONS 9-10: 1

## 2024-12-12 ASSESSMENT — ENCOUNTER SYMPTOMS
DEPRESSION: 0
OCCASIONAL FEELINGS OF UNSTEADINESS: 0
LOSS OF SENSATION IN FEET: 0

## 2024-12-12 NOTE — PROGRESS NOTES
Subjective   Reason for Visit: Kayleen Rincon is an 74 y.o. female here for a Medicare Wellness visit.     Past Medical, Surgical, and Family History reviewed and updated in chart.    Reviewed all medications by prescribing practitioner or clinical pharmacist (such as prescriptions, OTCs, herbal therapies and supplements) and documented in the medical record.    HPI    Previous labs ordered (6/12/2024) for today's visit remain active.    Right knee pain/ OA of R knee: Unimproved s/p corticosteroid injection and Durolane gel injection (10/29/2024) by Dr Galvan. Scheduled for R TKA (1/8/2025) by Dr. Liu.      HLD: Last lipid panel (2/12/2024) with cholesterol 186, , HDL 48.4, and .   Patient was ordered rosuvastatin, she states that she will not take the med      PAD: Followed by Vascular surgery (Dr. Luke), . S/p stent in LLE by vascular surgery (Dr. Luke). No vascular surgery follow-up in past 2-3 years.  Continues to do well/no issues to report.   Patient has stopped taking Plavix, she was concerned about bruising noted when taking Plavix. She does not want to take it now     Diverticulosis/ black stools: Followed by GI (APRN-STACY Aviles). Patient message (11/21/2024) for results review. Pt not anemic, with etiology of black stools correlated to her iron supplementation. Negative stool studies including calprotectin, pancreatic elastase, and tissue transglutaminase IgA.    Patient stated her black stools starter prior to starting iron supplementation.   Colonoscopy (11/3/22): mild non-bleeding diverticulosis in sigmoid colon. Grade I internal hemorrhoid.   EGD (11/3/2022): Single ulcer (3-5 mm)in gastric antrum. Normal duodenal/esophageal mucosa. Patient still has black stools, she still takes iron as noted     JOANNE: Not anemic (Hgb 14.6) on last CBC (10/30/2024). Taking oral iron 325 mg x5 days per week.       Vitamin D deficiency: She takes 50,000 international units once/weekly.     GERD:  "Previously taking omeprazole 20 mg daily (Rx has since ).   H/o H. Pylori over 1 year ago. Negative H. Pylori screening (2024).      Back pain: Chronic issues, with back  x30 years. Remains tolerable. Taking acetaminophen. Previous OTC medications have proven ineffective. TENs unit helps a little. She also goes to chiropractor. Exacerbated by walking, improved with rest. Denies loss of bladder/bowel control.   T-spine XR (10/13/2023): Mild lower T-spine degenerative changes. Mild S-shaped scoliosis.   L-spine XR (10/13/2023): Mild/moderate facet joint degenerative changes. Grade 1 L4-L5 and L5-S1 anterolisthesis.     Patient is going to have a right knee replacement per Dr Liu, she had gel injections that was not particularly helpful.        Patient Care Team:  Kailash Triplett MD as PCP - General (Internal Medicine)  Kailash Triplett MD as PCP - [employer] PCP     Review of Systems     otherwise negative aside from what was mentioned above in HPI.     Objective   Vitals:  /89 (BP Location: Left arm, Patient Position: Sitting, BP Cuff Size: Adult)   Pulse 75   Temp 36.5 °C (97.7 °F) (Temporal)   Resp 16   Ht 1.575 m (5' 2\")   Wt 98.2 kg (216 lb 9.6 oz)   SpO2 99%   BMI 39.62 kg/m²       Physical Exam             Constitutional:       Appearance: Normal appearance. She is obese. She is not ill-appearing, sitting in chair.  Accompanied by   HENT:      Head: Normocephalic and atraumatic. No thyromegaly or neck masses  Eyes:      Extraocular Movements: Extraocular movements intact.      Conjunctiva/sclera: Conjunctivae normal.   Cardiovascular:      Rate and Rhythm: Normal rate and regular rhythm.      Pulses: Normal pulses.      Heart sounds: Normal heart sounds. soft 1/6 systolic murmur noted over the RUSB, No carotid bruits, no peripheral edema  Pulmonary:      Effort: Pulmonary effort is normal.      Comments: Diminished breath sounds throughout all fields, " especially in the bases bilaterally  Abdominal:          Comments: Abdomen is obese   Musculoskeletal:         General: tenderness is noted over the right medial knee  Skin:     General: Skin is warm and dry. No lesions on exposed skin  Neurological:      General: No focal deficit present.      Mental Status: She is alert and oriented to person, place, and time.   Psychiatric:         Mood and Affect: Mood normal.         Behavior: Behavior normal.      Assessment & Plan  Hyperlipemia, mixed  Patient declines statin therapy, recheck labs        Peripheral arterial disease (CMS-HCC)  Patient has seen vascular surgery in the past, patient stopped the Plavix, she no longer wants to take it due to bruising, advise taking ASA 81 mg daily  Orders:    aspirin 81 mg EC tablet; Take 1 tablet (81 mg) by mouth once daily.    Primary osteoarthritis of right knee  Patient states that Dr Liu has scheduled knee replacement on 1/8/2025     Diverticulosis of colon  Patient saw GI, no anemia noted       Routine general medical examination at health care facility Medicare wellness exam completed, patient stopped Plavix, she never started rosuvastatin, she declines   Orders:    1 Year Follow Up In Primary Care - Wellness Exam; Future    History of cervical cancer  Patient is s/p hysterectomy, she never had additional therapies         Systolic murmur  Check an ECHO cardiogram, patient is agreeable  Orders:    Transthoracic Echo (TTE) Complete; Future    Visit for screening mammogram  Check mammograms as ordered, last mammogram was in 2021  Orders:    BI mammo bilateral screening tomosynthesis; Future    History of breast cancer    Orders:    BI mammo bilateral screening tomosynthesis; Future    Transthoracic Echo (TTE) Complete; Future     Get labs completed as already ordered. They are already ordered.   Follow up in 6 months

## 2024-12-12 NOTE — ASSESSMENT & PLAN NOTE
Check an ECHO cardiogram, patient is agreeable  Orders:    Transthoracic Echo (TTE) Complete; Future

## 2024-12-12 NOTE — ASSESSMENT & PLAN NOTE
Patient has seen vascular surgery in the past, patient stopped the Plavix, she no longer wants to take it due to bruising, advise taking ASA 81 mg daily  Orders:    aspirin 81 mg EC tablet; Take 1 tablet (81 mg) by mouth once daily.

## 2024-12-12 NOTE — ASSESSMENT & PLAN NOTE
Medicare wellness exam completed, patient stopped Plavix, she never started rosuvastatin, she declines   Orders:    1 Year Follow Up In Primary Care - Wellness Exam; Future

## 2024-12-30 ENCOUNTER — ANESTHESIA EVENT (OUTPATIENT)
Dept: OPERATING ROOM | Facility: HOSPITAL | Age: 74
End: 2024-12-30
Payer: MEDICARE

## 2024-12-30 SDOH — HEALTH STABILITY: MENTAL HEALTH: CURRENT SMOKER: 0

## 2024-12-30 NOTE — ANESTHESIA PREPROCEDURE EVALUATION
"Patient: Kayleen Rincon \"Becky\"    Procedure Information       Date/Time: 01/08/25 1300    Procedure: Right Total Knee Arthroplasty *Rapid Recovery*,( [place]), Spinal/Block (Right: Knee) - Schedule: 2 HOURS, Rapid Recovery, [place], Spinal/Block    Location: [facility] / [facility]    Surgeons: Lazara iLu, DO            Relevant Problems   Anesthesia   (+) Delayed emergence from anesthesia      Cardiac   (+) Hyperlipemia, mixed   (+) Peripheral arterial disease (CMS-HCC)   (+) Systolic murmur      Liver   (+) Hepatic steatosis      Endocrine   (+) Hypothyroidism   (+) Morbid obesity (Multi)      Hematology   (+) Anemia   (+) Iron deficiency anemia      Musculoskeletal   (+) Double thoracic curve idiopathic scoliosis   (+) Osteoarthritis of right knee      HEENT   (+) Bilateral hearing loss      ID   (+) Helicobacter pylori gastritis       Clinical information reviewed:      Problems              NPO Detail:  No data recorded     Physical Exam    Airway  Mallampati: III  TM distance: <3 FB  Neck ROM: full     Cardiovascular    Dental        Pulmonary    Abdominal            Anesthesia Plan    History of general anesthesia?: yes  History of complications of general anesthesia?: no    ASA 3     general, regional and spinal   (Right Adductor Canal and IPACK Nerve Block  Spinal/GA)  The patient is not a current smoker.    intravenous induction   Postoperative administration of opioids is intended.  Anesthetic plan and risks discussed with patient.    Plan discussed with CRNA.      "

## 2024-12-31 RX ORDER — CHLORHEXIDINE GLUCONATE ORAL RINSE 1.2 MG/ML
SOLUTION DENTAL
Qty: 120 ML | Refills: 0 | Status: SHIPPED | OUTPATIENT
Start: 2024-12-31

## 2025-01-06 ENCOUNTER — TELEPHONE (OUTPATIENT)
Dept: ORTHOPEDIC SURGERY | Facility: HOSPITAL | Age: 75
End: 2025-01-06
Payer: MEDICARE

## 2025-01-06 ENCOUNTER — PRE-ADMISSION TESTING (OUTPATIENT)
Dept: PREADMISSION TESTING | Facility: HOSPITAL | Age: 75
End: 2025-01-06
Payer: MEDICARE

## 2025-01-06 DIAGNOSIS — R01.1 SYSTOLIC MURMUR: ICD-10-CM

## 2025-01-06 DIAGNOSIS — M17.11 OSTEOARTHRITIS OF RIGHT KNEE, UNSPECIFIED OSTEOARTHRITIS TYPE: ICD-10-CM

## 2025-01-06 DIAGNOSIS — E03.9 HYPOTHYROIDISM, UNSPECIFIED TYPE: ICD-10-CM

## 2025-01-06 DIAGNOSIS — Z01.818 PRE-OP EVALUATION: Primary | ICD-10-CM

## 2025-01-06 DIAGNOSIS — D64.9 ANEMIA, UNSPECIFIED TYPE: ICD-10-CM

## 2025-01-06 ASSESSMENT — LIFESTYLE VARIABLES: SMOKING_STATUS: NONSMOKER

## 2025-01-06 NOTE — TELEPHONE ENCOUNTER
Patient scheduled for Right Total Knee Arthroplasty 1/8/2025  She needs to reschedule due  to her  being in the hospital she is also scheduled for pre admission testing today which she is unable to attend she would like for you to call her call phone [phone] or notify her by My chart if possible

## 2025-01-08 ENCOUNTER — ANESTHESIA (OUTPATIENT)
Dept: OPERATING ROOM | Facility: HOSPITAL | Age: 75
End: 2025-01-08
Payer: MEDICARE

## 2025-01-28 ENCOUNTER — APPOINTMENT (OUTPATIENT)
Dept: ORTHOPEDIC SURGERY | Facility: CLINIC | Age: 75
End: 2025-01-28
Payer: MEDICARE

## 2025-01-29 ENCOUNTER — TELEPHONE (OUTPATIENT)
Dept: PREADMISSION TESTING | Facility: HOSPITAL | Age: 75
End: 2025-01-29
Payer: MEDICARE

## 2025-01-30 ENCOUNTER — HOSPITAL ENCOUNTER (OUTPATIENT)
Dept: RADIOLOGY | Facility: HOSPITAL | Age: 75
Discharge: HOME | End: 2025-01-30
Payer: MEDICARE

## 2025-01-30 ENCOUNTER — HOSPITAL ENCOUNTER (OUTPATIENT)
Dept: CARDIOLOGY | Facility: HOSPITAL | Age: 75
Discharge: HOME | End: 2025-01-30
Payer: MEDICARE

## 2025-01-30 VITALS — BODY MASS INDEX: 37.36 KG/M2 | WEIGHT: 203 LBS | HEIGHT: 62 IN

## 2025-01-30 DIAGNOSIS — Z12.31 VISIT FOR SCREENING MAMMOGRAM: ICD-10-CM

## 2025-01-30 DIAGNOSIS — Z85.3 HISTORY OF BREAST CANCER: ICD-10-CM

## 2025-01-30 DIAGNOSIS — R01.1 SYSTOLIC MURMUR: ICD-10-CM

## 2025-01-30 LAB
AORTIC VALVE MEAN GRADIENT: 3 MMHG
AORTIC VALVE PEAK VELOCITY: 1.06 M/S
AV PEAK GRADIENT: 5 MMHG
AVA (PEAK VEL): 2.99 CM2
AVA (VTI): 2.67 CM2
EJECTION FRACTION APICAL 4 CHAMBER: 53.4
EJECTION FRACTION: 54 %
LEFT ATRIUM VOLUME AREA LENGTH INDEX BSA: 27.7 ML/M2
LEFT VENTRICLE INTERNAL DIMENSION DIASTOLE: 4.94 CM (ref 3.5–6)
LEFT VENTRICULAR OUTFLOW TRACT DIAMETER: 2.2 CM
LV EJECTION FRACTION BIPLANE: 54 %
MITRAL VALVE E/A RATIO: 0.65
RIGHT VENTRICLE FREE WALL PEAK S': 12.26 CM/S
TRICUSPID ANNULAR PLANE SYSTOLIC EXCURSION: 2.2 CM

## 2025-01-30 PROCEDURE — 77067 SCR MAMMO BI INCL CAD: CPT | Mod: LEFT SIDE | Performed by: RADIOLOGY

## 2025-01-30 PROCEDURE — 93306 TTE W/DOPPLER COMPLETE: CPT

## 2025-01-30 PROCEDURE — 77063 BREAST TOMOSYNTHESIS BI: CPT | Mod: LEFT SIDE | Performed by: RADIOLOGY

## 2025-01-30 PROCEDURE — 77067 SCR MAMMO BI INCL CAD: CPT | Mod: 52,LT

## 2025-01-30 PROCEDURE — 93306 TTE W/DOPPLER COMPLETE: CPT | Performed by: INTERNAL MEDICINE

## 2025-01-31 ENCOUNTER — PRE-ADMISSION TESTING (OUTPATIENT)
Dept: PREADMISSION TESTING | Facility: HOSPITAL | Age: 75
End: 2025-01-31
Payer: MEDICARE

## 2025-01-31 ENCOUNTER — HOSPITAL ENCOUNTER (OUTPATIENT)
Dept: RADIOLOGY | Facility: HOSPITAL | Age: 75
Discharge: HOME | End: 2025-01-31
Payer: MEDICARE

## 2025-01-31 ENCOUNTER — HOSPITAL ENCOUNTER (OUTPATIENT)
Dept: CARDIOLOGY | Facility: HOSPITAL | Age: 75
Discharge: HOME | End: 2025-01-31
Payer: MEDICARE

## 2025-01-31 VITALS
HEIGHT: 62 IN | DIASTOLIC BLOOD PRESSURE: 88 MMHG | TEMPERATURE: 97.3 F | RESPIRATION RATE: 20 BRPM | BODY MASS INDEX: 39.2 KG/M2 | WEIGHT: 213 LBS | SYSTOLIC BLOOD PRESSURE: 146 MMHG | OXYGEN SATURATION: 97 % | HEART RATE: 88 BPM

## 2025-01-31 DIAGNOSIS — Z01.818 PRE-OP EVALUATION: ICD-10-CM

## 2025-01-31 DIAGNOSIS — R01.1 MURMUR, CARDIAC: ICD-10-CM

## 2025-01-31 DIAGNOSIS — E03.9 HYPOTHYROIDISM, UNSPECIFIED TYPE: ICD-10-CM

## 2025-01-31 DIAGNOSIS — M17.11 OSTEOARTHRITIS OF RIGHT KNEE, UNSPECIFIED OSTEOARTHRITIS TYPE: ICD-10-CM

## 2025-01-31 DIAGNOSIS — D64.9 ANEMIA, UNSPECIFIED TYPE: ICD-10-CM

## 2025-01-31 DIAGNOSIS — Z01.818 PRE-OP EVALUATION: Primary | ICD-10-CM

## 2025-01-31 LAB
ABO GROUP (TYPE) IN BLOOD: NORMAL
ANION GAP SERPL CALC-SCNC: 9 MMOL/L (ref 10–20)
ANTIBODY SCREEN: NORMAL
BUN SERPL-MCNC: 18 MG/DL (ref 6–23)
CALCIUM SERPL-MCNC: 9.3 MG/DL (ref 8.6–10.3)
CHLORIDE SERPL-SCNC: 105 MMOL/L (ref 98–107)
CO2 SERPL-SCNC: 25 MMOL/L (ref 21–32)
CREAT SERPL-MCNC: 0.64 MG/DL (ref 0.5–1.05)
EGFRCR SERPLBLD CKD-EPI 2021: >90 ML/MIN/1.73M*2
ERYTHROCYTE [DISTWIDTH] IN BLOOD BY AUTOMATED COUNT: 12.5 % (ref 11.5–14.5)
GLUCOSE SERPL-MCNC: 92 MG/DL (ref 74–99)
HCT VFR BLD AUTO: 40.1 % (ref 36–46)
HGB BLD-MCNC: 13.3 G/DL (ref 12–16)
MCH RBC QN AUTO: 29.8 PG (ref 26–34)
MCHC RBC AUTO-ENTMCNC: 33.2 G/DL (ref 32–36)
MCV RBC AUTO: 90 FL (ref 80–100)
NRBC BLD-RTO: 0 /100 WBCS (ref 0–0)
PLATELET # BLD AUTO: 305 X10*3/UL (ref 150–450)
POTASSIUM SERPL-SCNC: 4 MMOL/L (ref 3.5–5.3)
RBC # BLD AUTO: 4.47 X10*6/UL (ref 4–5.2)
RH FACTOR (ANTIGEN D): NORMAL
SODIUM SERPL-SCNC: 135 MMOL/L (ref 136–145)
WBC # BLD AUTO: 9.2 X10*3/UL (ref 4.4–11.3)

## 2025-01-31 PROCEDURE — 99204 OFFICE O/P NEW MOD 45 MIN: CPT | Performed by: CLINICAL NURSE SPECIALIST

## 2025-01-31 PROCEDURE — 87081 CULTURE SCREEN ONLY: CPT | Mod: PORLAB

## 2025-01-31 PROCEDURE — 85027 COMPLETE CBC AUTOMATED: CPT

## 2025-01-31 PROCEDURE — 93005 ELECTROCARDIOGRAM TRACING: CPT

## 2025-01-31 PROCEDURE — 71046 X-RAY EXAM CHEST 2 VIEWS: CPT

## 2025-01-31 PROCEDURE — 36415 COLL VENOUS BLD VENIPUNCTURE: CPT

## 2025-01-31 PROCEDURE — 80048 BASIC METABOLIC PNL TOTAL CA: CPT

## 2025-01-31 PROCEDURE — 86901 BLOOD TYPING SEROLOGIC RH(D): CPT

## 2025-01-31 ASSESSMENT — ENCOUNTER SYMPTOMS
GASTROINTESTINAL NEGATIVE: 1
HEMATOLOGIC/LYMPHATIC NEGATIVE: 1
PSYCHIATRIC NEGATIVE: 1
RESPIRATORY NEGATIVE: 1
CONSTITUTIONAL NEGATIVE: 1
ENDOCRINE NEGATIVE: 1
ARTHRALGIAS: 1
ALLERGIC/IMMUNOLOGIC NEGATIVE: 1
EYES NEGATIVE: 1
CARDIOVASCULAR NEGATIVE: 1
MYALGIAS: 1

## 2025-01-31 ASSESSMENT — LIFESTYLE VARIABLES: SMOKING_STATUS: NONSMOKER

## 2025-01-31 NOTE — PREPROCEDURE INSTRUCTIONS
Medication List            Accurate as of January 31, 2025  1:05 PM. Always use your most recent med list.                aspirin 81 mg EC tablet  Take 1 tablet (81 mg) by mouth once daily.     chlorhexidine 0.12 % solution  Commonly known as: Peridex  Swish and Spit 15 ml the night before and the morning of surgery. (2 Doses)     ergocalciferol 1.25 MG (93432 UT) capsule  Commonly known as: Vitamin D-2  TAKE 1 CAPSULE BY MOUTH 1 TIME PER WEEK     ferrous sulfate (325 mg ferrous sulfate) tablet  Commonly known as: iron  Take 1 tablet by mouth once daily (M-F).     omeprazole OTC 20 mg EC tablet  Commonly known as: PriLOSEC OTC  Take 1 tablet (20 mg) by mouth twice a day for 14 days, then take daily for 30 days more.                              NPO Instructions:    Nothing to eat or drink after midnight  Hydrate well the day before surgery  No medications morning of surgery  Continue aspirin but do not take morning of surgery   Peridex swish and spit night before and morning of surgery after brushing your teeth        Additional Instructions:     Wear  comfortable loose fitting clothing  Do not use moisturizers, creams, lotions or perfume  All jewelry and valuables should be left at home  [facility] 5 days prior to surgery and morning of surgery below neck line and away from private area   Deodorant only morning of surgery  Call with any questions [phone]    Deep Breathing Exercises after surgery:   Breathe deeply using your lungs as fully as possible to move   secretions and clear lungs more easily.  Do a cycle of five deep breaths every hour.      Start by placing your hands on your ribs and take a deep breath in through your nose, expanding your lower chest.  You should feel your ribs push against your hands.  Breathe out slowly through your mouth until all the air is gone.    For every other breath, hold your breath for three seconds.  This will help keep the lungs fully open.     Coughing :  Coughing is necessary to clear secretions that may accumulate in your lungs.  This should be done after breathing exercises.    If lying down, bend your knees and support you incision with a pillow or your hands to make it more comfortable.    If sitting, support your incision, lean forward and keep your feet on the floor.  After your five deep breaths, breathe in and cough out sharply.  Repeat this cycle twice or for as long as you have secretions to clear.  ( You will not put your incision at risk by coughing.)    Leg Exercises:  Leg exercises are important to maintain good blood circulation in your legs, maintain muscle strength and prevent joint stiffness.  Do each exercise for 5 repetitions every hour while awake for the first few days or until you are up and walking around.         A. Pump your feet up and down at the ankles        B. With your legs straight, make circles with your feet.        C. Bend and straighten your knees by sliding your heels up and down the bed.         D. With your legs straight tighten the muscle above your knee and push the back of your knee down             Into the bed.  Hold for five seconds and then relax.  Alternate legs.

## 2025-01-31 NOTE — H&P
"History Of Present Illness  Kayleen Rincon \"Reji" is a  pleasant 74 y.o. female presenting with right knee pain, OA. Scheduled for right total knee arthroplasty under general/spinal anesthesia per Dr. Liu on 2/12/25.      Past Medical History  Past Medical History:   Diagnosis Date    Allergic 1990    Arthritis     Breast cancer (Multi) 2001    Chronic headaches 1994    not any longer, sinus surgery    Delayed emergence from general anesthesia 1990    i have a difficult time awakening from the anesthesia.    Easy bruising 2015    Dr AGUILAR diagnosed    GERD (gastroesophageal reflux disease)     Hernia, internal 201    Hiatal hernia surgery in 2005?    HL (hearing loss)     Joint pain     Obesity 2003    Cant lose weight no.matter what i do or dont eat  was walking 3 miles a day on 14/10 fast  still nothing    Ovarian cancer (Multi) 1978    Personal history of malignant neoplasm of breast     History of malignant neoplasm of breast    Personal history of other endocrine, nutritional and metabolic disease     History of hyperlipidemia    Symptomatic varicose veins, right     Wears dentures     upper    Wears partial dentures     lower       Surgical History  Past Surgical History:   Procedure Laterality Date    APPENDECTOMY  2002    CATARACT EXTRACTION Bilateral 2023    CHOLECYSTECTOMY  2002    COLONOSCOPY  2023    FLEXIBLE SIGMOIDOSCOPY  2023    HERNIA REPAIR  2015    HYSTERECTOMY  1976    MASTECTOMY  2001    OTHER SURGICAL HISTORY  09/16/2019    Cholecystectomy    OTHER SURGICAL HISTORY  09/16/2019    Right mastectomy    OTHER SURGICAL HISTORY  09/16/2019    Appendectomy    OTHER SURGICAL HISTORY  09/16/2019    Tonsillectomy    OTHER SURGICAL HISTORY  09/16/2019    Rhinoplasty    OTHER SURGICAL HISTORY  11/15/2019    Vascular surgical procedure    SINUS SURGERY  1994    TONSILLECTOMY  1994        Social History  She reports that she has never smoked. She has never been exposed to tobacco smoke. She has never used " smokeless tobacco. She reports that she does not drink alcohol and does not use drugs.    Family History  Family History   Problem Relation Name Age of Onset    Cancer Other My self         aunt        Allergies  Latex and Penicillins    Review of Systems   Constitutional: Negative.    HENT: Negative.     Eyes: Negative.    Respiratory: Negative.     Cardiovascular: Negative.    Gastrointestinal: Negative.    Endocrine: Negative.    Genitourinary: Negative.    Musculoskeletal:  Positive for arthralgias, gait problem and myalgias.        Right knee pain, OA. Patient rates pain at a 4/10 currently, and 11+/10 when it's at it's worst. Patient states knee will often stiffen as well.    Skin: Negative.    Allergic/Immunologic: Negative.    Hematological: Negative.    Psychiatric/Behavioral: Negative.     All other systems reviewed and are negative.       Physical Exam  Vitals and nursing note reviewed.   Constitutional:       Appearance: Normal appearance. She is obese.   HENT:      Head: Normocephalic.      Nose: Nose normal.      Mouth/Throat:      Comments:   Mallampati: 2  TMD: >3  Finger breadth: 3  Dentition:  upper denture, lower partial.   Neck ROM: full   Eyes:      Pupils: Pupils are equal, round, and reactive to light.   Cardiovascular:      Rate and Rhythm: Normal rate and regular rhythm.      Heart sounds: Murmur heard.      Systolic murmur is present with a grade of 2/6.   Pulmonary:      Effort: Pulmonary effort is normal.      Breath sounds: Wheezing present.      Comments: Small expiratory wheeze noted to right upper posterior lung field.   Abdominal:      General: Bowel sounds are normal.      Palpations: Abdomen is soft.      Comments: Bowel sounds active x4 quads    Musculoskeletal:         General: Normal range of motion.      Cervical back: Normal range of motion.      Left lower leg: Edema present.      Comments: Right knee pain / OA.    Skin:     General: Skin is warm and dry.   Neurological:       "General: No focal deficit present.      Mental Status: She is alert and oriented to person, place, and time.   Psychiatric:         Mood and Affect: Mood normal.         Behavior: Behavior normal.         Thought Content: Thought content normal.         Judgment: Judgment normal.          Last Recorded Vitals  Blood pressure 146/88, pulse 88, temperature 36.3 °C (97.3 °F), temperature source Oral, resp. rate 20, height 1.575 m (5' 2\"), weight 96.6 kg (213 lb), SpO2 97%.    Visit Vitals  /88   Pulse 88   Temp 36.3 °C (97.3 °F) (Oral)   Resp 20   Ht 1.575 m (5' 2\")   Wt 96.6 kg (213 lb)   SpO2 97%   BMI 38.96 kg/m²   Smoking Status Never   BSA 2.06 m²       DASI Risk Score      Flowsheet Row Questionnaire Series Submission from 12/12/2024 in [facility] with Generic Provider Crys   Can you take care of yourself (eat, dress, bathe, or use toilet)?  2.75  filed at 12/12/2024 1145   Can you walk indoors, such as around your house? 1.75  filed at 12/12/2024 1145   Can you walk a block or two on level ground?  2.75  filed at 12/12/2024 1145   Can you climb a flight of stairs or walk up a hill? 5.5  filed at 12/12/2024 1145   Can you run a short distance? 0  filed at 12/12/2024 1145   Can you do light work around the house like dusting or washing dishes? 2.7  filed at 12/12/2024 1145   Can you do moderate work around the house like vacuuming, sweeping floors or carrying groceries? 3.5  filed at 12/12/2024 1145   Can you do heavy work around the house like scrubbing floors or lifting and moving heavy furniture?  8  filed at 12/12/2024 1145   Can you do yard work like raking leaves, weeding or pushing a mower? 4.5  filed at 12/12/2024 1145   Can you have sexual relations? 5.25  filed at 12/12/2024 1145   Can you participate in moderate recreational activities like golf, bowling, dancing, doubles tennis or throwing a baseball or football? 6  filed at 12/12/2024 6961   Can you participate in strenous sports like " swimming, singles tennis, football, basketball, or skiing? 0  filed at 12/12/2024 1145   DASI SCORE 42.7  filed at 12/12/2024 1145   METS Score (Will be calculated only when all the questions are answered) 8  filed at 12/12/2024 1145          Caprini DVT Assessment      Flowsheet Row Pre-Admission Testing from 1/31/2025 in  [facility] Pre-Admission Testing from 1/6/2025 in  [facility]   DVT Score (IF A SCORE IS NOT CALCULATING, MUST SELECT A BMI TO COMPLETE) 13 filed at 01/31/2025 1347 15 filed at 01/06/2025 0832   Medical Factors -- Varicose veins, Inflammatory bowel disease filed at 01/06/2025 0832   Surgical Factors Elective major lower extremity arthroplasty, Major surgery planned, including arthroscopic and laproscopic (1-2 hours) filed at 01/31/2025 1347 Major surgery planned, including arthroscopic and laproscopic (1-2 hours), Elective major lower extremity arthroplasty filed at 01/06/2025 0832   BMI (BMI MUST BE CHOSEN) 31-40 (Obesity) filed at 01/31/2025 1347 31-40 (Obesity) filed at 01/06/2025 0832          Modified Frailty Index    No data to display       CHADS2 Stroke Risk  Current as of 12 minutes ago        N/A 3 to 100%: High Risk   2 to < 3%: Medium Risk   0 to < 2%: Low Risk     Last Change: N/A          This score determines the patient's risk of having a stroke if the patient has atrial fibrillation.        This score is not applicable to this patient. Components are not calculated.          Revised Cardiac Risk Index      Flowsheet Row Pre-Admission Testing from 1/31/2025 in  [facility] Pre-Admission Testing from 1/6/2025 in  [facility]   High-Risk Surgery (Intraperitoneal, Intrathoracic,Suprainguinal vascular) 1 filed at 01/31/2025 1348 1 filed at 01/06/2025 0833   History of ischemic heart disease (History of MI, History of positive exercuse test, Current chest paint considered due to myocardial ischemia, Use of nitrate therapy, ECG  with pathological Q Waves) 0 filed at 01/31/2025 1348 0 filed at 01/06/2025 0833   History of congestive heart failure (pulmonary edemia, bilateral rales or S3 gallop, Paroxysmal nocturnal dyspnea, CXR showing pulmonary vascular redistribution) 0 filed at 01/31/2025 1348 0 filed at 01/06/2025 0833   History of cerebrovascular disease (Prior TIA or stroke) 0 filed at 01/31/2025 1348 0 filed at 01/06/2025 0833   Pre-operative insulin treatment 0 filed at 01/31/2025 1348 0 filed at 01/06/2025 0833   Pre-operative creatinine>2 mg/dl 0 filed at 01/31/2025 1348 0 filed at 01/06/2025 0833   Revised Cardiac Risk Calculator 1 filed at 01/31/2025 1348 1 filed at 01/06/2025 0833          Apfel Simplified Score      Flowsheet Row Pre-Admission Testing from 1/31/2025 in  [facility] Pre-Admission Testing from 1/6/2025 in  [facility]   Smoking status 1 filed at 01/31/2025 1349 1 filed at 01/06/2025 0833   History of motion sickness or PONV  0 filed at 01/31/2025 1349 0 filed at 01/06/2025 0833   Use of postoperative opioids 1 filed at 01/31/2025 1349 1 filed at 01/06/2025 0833   Gender - Female 1=Yes filed at 01/31/2025 1349 1=Yes filed at 01/06/2025 0833   Apfel Simplified Score Calculator 3 filed at 01/31/2025 1349 3 filed at 01/06/2025 0833          Risk Analysis Index Results This Encounter    No data found in the last 10 encounters.       Stop Bang Score      Flowsheet Row Pre-Admission Testing from 1/31/2025 in  [facility] Questionnaire Series Submission from 12/12/2024 in [facility] with Generic Provider Crys   Do you snore loudly? 0 filed at 01/31/2025 1259 0  filed at 12/12/2024 1145   Do you often feel tired or fatigued after your sleep? 0 filed at 01/31/2025 1259 0  filed at 12/12/2024 1145   Has anyone ever observed you stop breathing in your sleep? 0 filed at 01/31/2025 1259 0  filed at 12/12/2024 1145   Do you have or are you being treated for high blood pressure? 0  filed at 01/31/2025 1259 0  filed at 12/12/2024 1145   Recent BMI (Calculated) 37.1 filed at 01/31/2025 1259 38  filed at 12/12/2024 1145   Is BMI greater than 35 kg/m2? 1=Yes filed at 01/31/2025 1259 1=Yes  filed at 12/12/2024 1145   Age older than 50 years old? 1=Yes filed at 01/31/2025 1259 1=Yes  filed at 12/12/2024 1145   Is your neck circumference greater than 17 inches (Male) or 16 inches (Female)? 0 filed at 01/31/2025 1259 --   Gender - Male 0=No filed at 01/31/2025 1259 0=No  filed at 12/12/2024 1145   STOP-BANG Total Score 2 filed at 01/31/2025 1259 --          Prodigy: High Risk  Total Score: 12              Prodigy Age Score           ARISCAT Score for Postoperative Pulmonary Complications      Flowsheet Row Pre-Admission Testing from 1/31/2025 in  [facility]   Age Calculated Score 3 filed at 01/31/2025 1349   Preoperative SpO2 0 filed at 01/31/2025 1349   Respiratory infection in the last month Either upper or lower (i.e., URI, bronchitis, pneumonia), with fever and antibiotic treatment 0 filed at 01/31/2025 1349   Preoperative anemia (Hgb less than 10 g/dl) 0 filed at 01/31/2025 1349   Surgical incision  0 filed at 01/31/2025 1349   Duration of surgery  0 filed at 01/31/2025 1349   Emergency Procedure  0 filed at 01/31/2025 1349   ARISCAT Total Score  3 filed at 01/31/2025 1349          Kelley Perioperative Risk for Myocardial Infarction or Cardiac Arrest (IAN)      Flowsheet Row Pre-Admission Testing from 1/31/2025 in  [facility]   Calculated Age Score 1.52 filed at 01/31/2025 1349   Functional Status  0 filed at 01/31/2025 1349   ASA Class  -3.29 filed at 01/31/2025 1349   Creatinine -0.10 filed at 01/31/2025 1349   Type of Procedure  0.80 filed at 01/31/2025 1349   IAN Total Score  -6.32 filed at 01/31/2025 1349   IAN % 0.18 filed at 01/31/2025 1349            Relevant Results  Results for orders placed or performed in visit on 01/31/25 (from the past 24 hours)   CBC    Result Value Ref Range    WBC 9.2 4.4 - 11.3 x10*3/uL    nRBC 0.0 0.0 - 0.0 /100 WBCs    RBC 4.47 4.00 - 5.20 x10*6/uL    Hemoglobin 13.3 12.0 - 16.0 g/dL    Hematocrit 40.1 36.0 - 46.0 %    MCV 90 80 - 100 fL    MCH 29.8 26.0 - 34.0 pg    MCHC 33.2 32.0 - 36.0 g/dL    RDW 12.5 11.5 - 14.5 %    Platelets 305 150 - 450 x10*3/uL               Assessment/Plan   Problem List Items Addressed This Visit       Anemia    Relevant Orders    CBC (Completed)    Type And Screen    Hypothyroidism    Relevant Orders    Basic Metabolic Panel    CBC (Completed)    ECG 12 Lead (Completed)    Osteoarthritis of right knee    Relevant Orders    Staphylococcus aureus/MRSA colonization, Culture    Type And Screen     Other Visit Diagnoses       Pre-op evaluation    -  Primary    Relevant Orders    Basic Metabolic Panel    CBC (Completed)    Staphylococcus aureus/MRSA colonization, Culture    ECG 12 Lead (Completed)    Type And Screen    XR chest 2 views    Murmur, cardiac                Right knee pain. OA. Scheduled for right total knee arthroplasty under general/spinal anesthesia per Dr. Liu on 2/12/25.   CBC, BMP ordered. CBC is WNL, BMP result is still pending. No anemia.   Hx of hypothyroidism, murmur. EKG ordered. Shows SR, rate of 75 bpm. Echo completed yesterday, shows 54% EF with mild aortic valve regurg.   MRSA, T&C ordered. Results still pending.   CXR ordered. Result is still pending.   H&P and airway assessment completed today.  Patient given Hibiclens today at [facility].   Total joint class is scheduled for Monday 2/3/25.   Ordered Peridex prescription to [facility] in [place].   Continue ASA.   Encouraged early ambulation, DVT/ PE prevention, constipation prevention, and C&DB post operatively. Patient verbalized understanding.   All surgery instructions reviewed with patient by RN. Verbalized understanding.   Please note: patient has hx of delayed emergence with anesthesia.          Maribell Dennis, APRN-CNS

## 2025-01-31 NOTE — H&P (VIEW-ONLY)
"History Of Present Illness  Kayleen Rincon \"Reji" is a  pleasant 74 y.o. female presenting with right knee pain, OA. Scheduled for right total knee arthroplasty under general/spinal anesthesia per Dr. Liu on 2/12/25.      Past Medical History  Past Medical History:   Diagnosis Date    Allergic 1990    Arthritis     Breast cancer (Multi) 2001    Chronic headaches 1994    not any longer, sinus surgery    Delayed emergence from general anesthesia 1990    i have a difficult time awakening from the anesthesia.    Easy bruising 2015    Dr AGUILAR diagnosed    GERD (gastroesophageal reflux disease)     Hernia, internal 201    Hiatal hernia surgery in 2005?    HL (hearing loss)     Joint pain     Obesity 2003    Cant lose weight no.matter what i do or dont eat  was walking 3 miles a day on 14/10 fast  still nothing    Ovarian cancer (Multi) 1978    Personal history of malignant neoplasm of breast     History of malignant neoplasm of breast    Personal history of other endocrine, nutritional and metabolic disease     History of hyperlipidemia    Symptomatic varicose veins, right     Wears dentures     upper    Wears partial dentures     lower       Surgical History  Past Surgical History:   Procedure Laterality Date    APPENDECTOMY  2002    CATARACT EXTRACTION Bilateral 2023    CHOLECYSTECTOMY  2002    COLONOSCOPY  2023    FLEXIBLE SIGMOIDOSCOPY  2023    HERNIA REPAIR  2015    HYSTERECTOMY  1976    MASTECTOMY  2001    OTHER SURGICAL HISTORY  09/16/2019    Cholecystectomy    OTHER SURGICAL HISTORY  09/16/2019    Right mastectomy    OTHER SURGICAL HISTORY  09/16/2019    Appendectomy    OTHER SURGICAL HISTORY  09/16/2019    Tonsillectomy    OTHER SURGICAL HISTORY  09/16/2019    Rhinoplasty    OTHER SURGICAL HISTORY  11/15/2019    Vascular surgical procedure    SINUS SURGERY  1994    TONSILLECTOMY  1994        Social History  She reports that she has never smoked. She has never been exposed to tobacco smoke. She has never used " smokeless tobacco. She reports that she does not drink alcohol and does not use drugs.    Family History  Family History   Problem Relation Name Age of Onset    Cancer Other My self         aunt        Allergies  Latex and Penicillins    Review of Systems   Constitutional: Negative.    HENT: Negative.     Eyes: Negative.    Respiratory: Negative.     Cardiovascular: Negative.    Gastrointestinal: Negative.    Endocrine: Negative.    Genitourinary: Negative.    Musculoskeletal:  Positive for arthralgias, gait problem and myalgias.        Right knee pain, OA. Patient rates pain at a 4/10 currently, and 11+/10 when it's at it's worst. Patient states knee will often stiffen as well.    Skin: Negative.    Allergic/Immunologic: Negative.    Hematological: Negative.    Psychiatric/Behavioral: Negative.     All other systems reviewed and are negative.       Physical Exam  Vitals and nursing note reviewed.   Constitutional:       Appearance: Normal appearance. She is obese.   HENT:      Head: Normocephalic.      Nose: Nose normal.      Mouth/Throat:      Comments:   Mallampati: 2  TMD: >3  Finger breadth: 3  Dentition:  upper denture, lower partial.   Neck ROM: full   Eyes:      Pupils: Pupils are equal, round, and reactive to light.   Cardiovascular:      Rate and Rhythm: Normal rate and regular rhythm.      Heart sounds: Murmur heard.      Systolic murmur is present with a grade of 2/6.   Pulmonary:      Effort: Pulmonary effort is normal.      Breath sounds: Wheezing present.      Comments: Small expiratory wheeze noted to right upper posterior lung field.   Abdominal:      General: Bowel sounds are normal.      Palpations: Abdomen is soft.      Comments: Bowel sounds active x4 quads    Musculoskeletal:         General: Normal range of motion.      Cervical back: Normal range of motion.      Left lower leg: Edema present.      Comments: Right knee pain / OA.    Skin:     General: Skin is warm and dry.   Neurological:       "General: No focal deficit present.      Mental Status: She is alert and oriented to person, place, and time.   Psychiatric:         Mood and Affect: Mood normal.         Behavior: Behavior normal.         Thought Content: Thought content normal.         Judgment: Judgment normal.          Last Recorded Vitals  Blood pressure 146/88, pulse 88, temperature 36.3 °C (97.3 °F), temperature source Oral, resp. rate 20, height 1.575 m (5' 2\"), weight 96.6 kg (213 lb), SpO2 97%.    Visit Vitals  /88   Pulse 88   Temp 36.3 °C (97.3 °F) (Oral)   Resp 20   Ht 1.575 m (5' 2\")   Wt 96.6 kg (213 lb)   SpO2 97%   BMI 38.96 kg/m²   Smoking Status Never   BSA 2.06 m²       DASI Risk Score      Flowsheet Row Questionnaire Series Submission from 12/12/2024 in [facility] with Generic Provider Crys   Can you take care of yourself (eat, dress, bathe, or use toilet)?  2.75  filed at 12/12/2024 1145   Can you walk indoors, such as around your house? 1.75  filed at 12/12/2024 1145   Can you walk a block or two on level ground?  2.75  filed at 12/12/2024 1145   Can you climb a flight of stairs or walk up a hill? 5.5  filed at 12/12/2024 1145   Can you run a short distance? 0  filed at 12/12/2024 1145   Can you do light work around the house like dusting or washing dishes? 2.7  filed at 12/12/2024 1145   Can you do moderate work around the house like vacuuming, sweeping floors or carrying groceries? 3.5  filed at 12/12/2024 1145   Can you do heavy work around the house like scrubbing floors or lifting and moving heavy furniture?  8  filed at 12/12/2024 1145   Can you do yard work like raking leaves, weeding or pushing a mower? 4.5  filed at 12/12/2024 1145   Can you have sexual relations? 5.25  filed at 12/12/2024 1145   Can you participate in moderate recreational activities like golf, bowling, dancing, doubles tennis or throwing a baseball or football? 6  filed at 12/12/2024 6448   Can you participate in strenous sports like " swimming, singles tennis, football, basketball, or skiing? 0  filed at 12/12/2024 1145   DASI SCORE 42.7  filed at 12/12/2024 1145   METS Score (Will be calculated only when all the questions are answered) 8  filed at 12/12/2024 1145          Caprini DVT Assessment      Flowsheet Row Pre-Admission Testing from 1/31/2025 in  [facility] Pre-Admission Testing from 1/6/2025 in  [facility]   DVT Score (IF A SCORE IS NOT CALCULATING, MUST SELECT A BMI TO COMPLETE) 13 filed at 01/31/2025 1347 15 filed at 01/06/2025 0832   Medical Factors -- Varicose veins, Inflammatory bowel disease filed at 01/06/2025 0832   Surgical Factors Elective major lower extremity arthroplasty, Major surgery planned, including arthroscopic and laproscopic (1-2 hours) filed at 01/31/2025 1347 Major surgery planned, including arthroscopic and laproscopic (1-2 hours), Elective major lower extremity arthroplasty filed at 01/06/2025 0832   BMI (BMI MUST BE CHOSEN) 31-40 (Obesity) filed at 01/31/2025 1347 31-40 (Obesity) filed at 01/06/2025 0832          Modified Frailty Index    No data to display       CHADS2 Stroke Risk  Current as of 12 minutes ago        N/A 3 to 100%: High Risk   2 to < 3%: Medium Risk   0 to < 2%: Low Risk     Last Change: N/A          This score determines the patient's risk of having a stroke if the patient has atrial fibrillation.        This score is not applicable to this patient. Components are not calculated.          Revised Cardiac Risk Index      Flowsheet Row Pre-Admission Testing from 1/31/2025 in  [facility] Pre-Admission Testing from 1/6/2025 in  [facility]   High-Risk Surgery (Intraperitoneal, Intrathoracic,Suprainguinal vascular) 1 filed at 01/31/2025 1348 1 filed at 01/06/2025 0833   History of ischemic heart disease (History of MI, History of positive exercuse test, Current chest paint considered due to myocardial ischemia, Use of nitrate therapy, ECG  with pathological Q Waves) 0 filed at 01/31/2025 1348 0 filed at 01/06/2025 0833   History of congestive heart failure (pulmonary edemia, bilateral rales or S3 gallop, Paroxysmal nocturnal dyspnea, CXR showing pulmonary vascular redistribution) 0 filed at 01/31/2025 1348 0 filed at 01/06/2025 0833   History of cerebrovascular disease (Prior TIA or stroke) 0 filed at 01/31/2025 1348 0 filed at 01/06/2025 0833   Pre-operative insulin treatment 0 filed at 01/31/2025 1348 0 filed at 01/06/2025 0833   Pre-operative creatinine>2 mg/dl 0 filed at 01/31/2025 1348 0 filed at 01/06/2025 0833   Revised Cardiac Risk Calculator 1 filed at 01/31/2025 1348 1 filed at 01/06/2025 0833          Apfel Simplified Score      Flowsheet Row Pre-Admission Testing from 1/31/2025 in  [facility] Pre-Admission Testing from 1/6/2025 in  [facility]   Smoking status 1 filed at 01/31/2025 1349 1 filed at 01/06/2025 0833   History of motion sickness or PONV  0 filed at 01/31/2025 1349 0 filed at 01/06/2025 0833   Use of postoperative opioids 1 filed at 01/31/2025 1349 1 filed at 01/06/2025 0833   Gender - Female 1=Yes filed at 01/31/2025 1349 1=Yes filed at 01/06/2025 0833   Apfel Simplified Score Calculator 3 filed at 01/31/2025 1349 3 filed at 01/06/2025 0833          Risk Analysis Index Results This Encounter    No data found in the last 10 encounters.       Stop Bang Score      Flowsheet Row Pre-Admission Testing from 1/31/2025 in  [facility] Questionnaire Series Submission from 12/12/2024 in [facility] with Generic Provider Crys   Do you snore loudly? 0 filed at 01/31/2025 1259 0  filed at 12/12/2024 1145   Do you often feel tired or fatigued after your sleep? 0 filed at 01/31/2025 1259 0  filed at 12/12/2024 1145   Has anyone ever observed you stop breathing in your sleep? 0 filed at 01/31/2025 1259 0  filed at 12/12/2024 1145   Do you have or are you being treated for high blood pressure? 0  filed at 01/31/2025 1259 0  filed at 12/12/2024 1145   Recent BMI (Calculated) 37.1 filed at 01/31/2025 1259 38  filed at 12/12/2024 1145   Is BMI greater than 35 kg/m2? 1=Yes filed at 01/31/2025 1259 1=Yes  filed at 12/12/2024 1145   Age older than 50 years old? 1=Yes filed at 01/31/2025 1259 1=Yes  filed at 12/12/2024 1145   Is your neck circumference greater than 17 inches (Male) or 16 inches (Female)? 0 filed at 01/31/2025 1259 --   Gender - Male 0=No filed at 01/31/2025 1259 0=No  filed at 12/12/2024 1145   STOP-BANG Total Score 2 filed at 01/31/2025 1259 --          Prodigy: High Risk  Total Score: 12              Prodigy Age Score           ARISCAT Score for Postoperative Pulmonary Complications      Flowsheet Row Pre-Admission Testing from 1/31/2025 in  [facility]   Age Calculated Score 3 filed at 01/31/2025 1349   Preoperative SpO2 0 filed at 01/31/2025 1349   Respiratory infection in the last month Either upper or lower (i.e., URI, bronchitis, pneumonia), with fever and antibiotic treatment 0 filed at 01/31/2025 1349   Preoperative anemia (Hgb less than 10 g/dl) 0 filed at 01/31/2025 1349   Surgical incision  0 filed at 01/31/2025 1349   Duration of surgery  0 filed at 01/31/2025 1349   Emergency Procedure  0 filed at 01/31/2025 1349   ARISCAT Total Score  3 filed at 01/31/2025 1349          Kelley Perioperative Risk for Myocardial Infarction or Cardiac Arrest (IAN)      Flowsheet Row Pre-Admission Testing from 1/31/2025 in  [facility]   Calculated Age Score 1.52 filed at 01/31/2025 1349   Functional Status  0 filed at 01/31/2025 1349   ASA Class  -3.29 filed at 01/31/2025 1349   Creatinine -0.10 filed at 01/31/2025 1349   Type of Procedure  0.80 filed at 01/31/2025 1349   IAN Total Score  -6.32 filed at 01/31/2025 1349   IAN % 0.18 filed at 01/31/2025 1349            Relevant Results  Results for orders placed or performed in visit on 01/31/25 (from the past 24 hours)   CBC    Result Value Ref Range    WBC 9.2 4.4 - 11.3 x10*3/uL    nRBC 0.0 0.0 - 0.0 /100 WBCs    RBC 4.47 4.00 - 5.20 x10*6/uL    Hemoglobin 13.3 12.0 - 16.0 g/dL    Hematocrit 40.1 36.0 - 46.0 %    MCV 90 80 - 100 fL    MCH 29.8 26.0 - 34.0 pg    MCHC 33.2 32.0 - 36.0 g/dL    RDW 12.5 11.5 - 14.5 %    Platelets 305 150 - 450 x10*3/uL               Assessment/Plan   Problem List Items Addressed This Visit       Anemia    Relevant Orders    CBC (Completed)    Type And Screen    Hypothyroidism    Relevant Orders    Basic Metabolic Panel    CBC (Completed)    ECG 12 Lead (Completed)    Osteoarthritis of right knee    Relevant Orders    Staphylococcus aureus/MRSA colonization, Culture    Type And Screen     Other Visit Diagnoses       Pre-op evaluation    -  Primary    Relevant Orders    Basic Metabolic Panel    CBC (Completed)    Staphylococcus aureus/MRSA colonization, Culture    ECG 12 Lead (Completed)    Type And Screen    XR chest 2 views    Murmur, cardiac                Right knee pain. OA. Scheduled for right total knee arthroplasty under general/spinal anesthesia per Dr. Liu on 2/12/25.   CBC, BMP ordered. CBC is WNL, BMP result is still pending. No anemia.   Hx of hypothyroidism, murmur. EKG ordered. Shows SR, rate of 75 bpm. Echo completed yesterday, shows 54% EF with mild aortic valve regurg.   MRSA, T&C ordered. Results still pending.   CXR ordered. Result is still pending.   H&P and airway assessment completed today.  Patient given H[facility]iclens [facility] at [facility].   Total joint class is scheduled for Monday 2/3/25.   Ordered Peridex prescription to [facility] in [place].   Continue ASA.   Encouraged early ambulation, DVT/ PE prevention, constipation prevention, and C&DB post operatively. Patient verbalized understanding.   All surgery instructions reviewed with patient by RN. Verbalized understanding.   Please note: patient has hx of delayed emergence with anesthesia.          Maribell Dennis, APRN-CNS

## 2025-02-01 LAB
ATRIAL RATE: 76 BPM
P AXIS: 45 DEGREES
PR INTERVAL: 207 MS
Q ONSET: 253 MS
QRS COUNT: 12 BEATS
QRS DURATION: 98 MS
QT INTERVAL: 391 MS
QTC CALCULATION(BAZETT): 437 MS
QTC FREDERICIA: 420 MS
R AXIS: -16 DEGREES
T AXIS: 22 DEGREES
T OFFSET: 448 MS
VENTRICULAR RATE: 75 BPM

## 2025-02-02 LAB — STAPHYLOCOCCUS SPEC CULT: NORMAL

## 2025-02-03 ENCOUNTER — TELEPHONE (OUTPATIENT)
Dept: PREOP | Facility: HOSPITAL | Age: 75
End: 2025-02-03
Payer: MEDICARE

## 2025-02-03 NOTE — TELEPHONE ENCOUNTER
Thank you for attending our Joint Replacement class today in preparation for your upcoming surgery.  Topics discussed include:     MyChart Enrollment  Communication with Care Team  My Chart is the best form of communication to reach all of your caregivers  You can send messages to specific care givers, or a care team  Continued Education  You will be enrolled in a Total Joint Replacement care plan to receive additional education before and after surgery  You can review a short recording of the class content  Access to Medical Records  You can access test results, office notes, appointments, etc.  You can connect to other healthcare systems who use [facility] ([facility], [facility], [facility], etc.)  [facility]  Program Information  Consent to Enroll     Background/Understanding of Joint Replacement Surgery  Potential for same day discharge  Any questions or concerns to be directed to the surgeon's office     How to Prepare for Surgery  Use of Nicotine Products/Smoking  Stop several weeks before surgery  Such products slow down the healing process and increase risk of post-op infection and complications  Clearance for Surgery  Medical Clearance by Specialists  Dental Clearance  Cracked/Broken/Loose teeth left untreated may postpone surgery  The importance of post-op antibiotics for dental visits per surgeon protocol  Preadmission Testing  **Potential for postponed surgery if appropriate clearance is not obtained  Medication Instruction  Follow instructions provided by the doctor who prescribes your medication (typically, but not limited to cardiologist)  Preadmission testing will provide additional instructions during your appointment on what to stop and what to take as you get closer to surgery  For clarification of these instructions, please call preadmission testing directly - [phone]  Tips for Preparing the home for discharge from the hospital  Care Partner  Requirement for surgery, the patient must have a plan to have  help at home  Potential for postponed surgery if plan for home support cannot be established  How the care partner can help after surgery  CHG Body Wash/Mouth Wash  Follow the instructions given at preadmission testing  Body wash is to be used on the body and hair for 5 washes  Mouthwash is to be used the night before and morning of surgery  **This is a system-wide protocol developed by infectious disease professionals, we will not alter our recommendations for those with sensitive skin or those who have special hair needs.  Please follow the instructions as they are written as this will provide the best infection prevention measures for surgery.  Should you have an allergy to one of the products, please discuss with your preadmission team**     What to Expect in the Hospital/At Home  Morning of Surgery NPO Guidelines  Nothing to eat after midnight  Surgical and Post-Surgical Care Team  Surgical Team  Anesthesia Team  Nursing  Physical Therapy  Care Coordinating  Pharmacy  Hospital Arrival Instructions  Arrive at the time provided to you  Consider traffic patterns (rush-hour) based on arrival time  Have arrangements made for a ride home  If discharging same day, care partner should remain at the hospital  Recovering after Surgery  Recovery Room - Visitors are not brought back  Transition to hospital room - 2nd Floor, Visitors will be directed to your room  The presence of and strategies for controlling surgical pain and swelling  The importance of early mobility  Side effects after surgery  What to expect if staying overnight     Discharge Planning  The intended plan for discharge will be for patients to discharge home  All patients require a care partner (family, friend, neighbor, etc.) to stay with the patient for the first few nights after surgery  The inability to secure help at home will postpone surgery  Home Care Services set up per surgeon order  Physical Therapy  Occupational Therapy  **If desired, private  duty care can be arranged by the patient ahead of time**  Outpatient Physical Therapy per surgeon order     Recovering at Home  Wound Care  Follow wound care instructions found in your discharge paperwork  Bandage is water-resistant and you may shower with the bandage  Do not scrub directly over the bandage  Do not submerge in water until cleared (bathtub, hot tub, pool, etc.)     Post-Op Risk Prevention  Infection Prevention  Promptly seek treatment for any infections post-operatively  Routine dental visits must be postponed for 3 months after surgery  Your surgeon may require antibiotics prior to future dental visits  Any concerns for infection not related directly to the knee or the hip should be managed by your primary care provider  Blood Clots  Be sure to complete the course of blood thinning medication as prescribed by your surgeon  Movement every 1-2 hours during the day is encouraged to prevent blood clots  Monitor for signs of blood clots  Wear compression stockings as prescribed by your surgeon  Constipation  Constipation is common following surgery  Drink plenty of fluids  Take stool softener/laxative as prescribed by your surgeon  Move around frequently  Eat foods high in fiber  Fall Prevention  Prepare home ahead of time to clear space to move with walker  Remove throw rugs and electrical cords from walkways  Install railings near any stairways with more than 2 steps  Use night lights for increased visibility at night  Continue to use your assistive device until cleared by surgeon or physical therapy  Dislocation Prevention - Not all procedures will have dislocation precautions  Follow dislocation precautions provided by your surgeon  It is OK to resume sexual activity about 6 weeks following surgery  Be sure to follow any dislocation precautions assigned     Durable Medical Equipment  Cold Therapy  Breg Cold Therapy Machines  Ice/Gel Packs  Assistive Devices  Folding Walker with Wheels (in the front  only)  No Rollators  Crutches if approved by Physical Therapy and Surgeon after surgery  Hip Kits  Raised Toilet Seats  Additional Compression Stockings     Joint Preservation  Healthy Activities when Cleared  Walking  Swimming  Bike Riding  Activities to Avoid  Refrain from repetitive motions which have a high impact on the joint  Gradual Progression  Progress activity slowly, listen to your body  Common Findings - NORMAL after surgery  Clicking/Grinding  Numbness near incision     Physical Therapy  Prehabilitation exercises  START TODAY ON BOTH LEGS  Surgery Specific Precautions  Follow surgery specific precautions found in your discharge paperwork     Follow-Up Visit  All patients will see their surgeon for a follow up visit after surgery  The visit may range from 2-6 weeks after surgery and is surgeon specific        Please don't hesitate to reach out if you have any additional questions or concerns.    Cady Grey [phone]

## 2025-02-11 PROCEDURE — RXMED WILLOW AMBULATORY MEDICATION CHARGE

## 2025-02-11 RX ORDER — OXYCODONE HYDROCHLORIDE 5 MG/1
5 TABLET ORAL EVERY 6 HOURS PRN
Qty: 28 TABLET | Refills: 0 | Status: SHIPPED | OUTPATIENT
Start: 2025-02-11 | End: 2025-02-20

## 2025-02-11 RX ORDER — ACETAMINOPHEN 325 MG/1
1000 TABLET ORAL EVERY 8 HOURS PRN
Qty: 90 TABLET | Refills: 1 | Status: SHIPPED | OUTPATIENT
Start: 2025-02-11

## 2025-02-11 RX ORDER — PANTOPRAZOLE SODIUM 40 MG/1
40 TABLET, DELAYED RELEASE ORAL DAILY PRN
Qty: 30 TABLET | Refills: 0 | Status: SHIPPED | OUTPATIENT
Start: 2025-02-11 | End: 2025-03-15

## 2025-02-11 RX ORDER — NAPROXEN SODIUM 220 MG/1
81 TABLET, FILM COATED ORAL 2 TIMES DAILY
Qty: 60 TABLET | Refills: 0 | Status: SHIPPED | OUTPATIENT
Start: 2025-02-11 | End: 2025-03-15

## 2025-02-11 RX ORDER — TRAMADOL HYDROCHLORIDE 50 MG/1
50 TABLET ORAL EVERY 6 HOURS PRN
Qty: 28 TABLET | Refills: 0 | Status: SHIPPED | OUTPATIENT
Start: 2025-02-11 | End: 2025-02-20

## 2025-02-11 RX ORDER — CELECOXIB 200 MG/1
200 CAPSULE ORAL 2 TIMES DAILY
Qty: 60 CAPSULE | Refills: 0 | Status: SHIPPED | OUTPATIENT
Start: 2025-02-11 | End: 2025-03-15

## 2025-02-11 RX ORDER — DOXYCYCLINE 100 MG/1
100 CAPSULE ORAL 2 TIMES DAILY
Qty: 14 CAPSULE | Refills: 0 | Status: SHIPPED | OUTPATIENT
Start: 2025-02-11 | End: 2025-02-20

## 2025-02-11 RX ORDER — CYCLOBENZAPRINE HCL 5 MG
5 TABLET ORAL 3 TIMES DAILY PRN
Qty: 30 TABLET | Refills: 0 | Status: SHIPPED | OUTPATIENT
Start: 2025-02-11 | End: 2025-02-23

## 2025-02-11 RX ORDER — SENNOSIDES 8.6 MG/1
1 TABLET ORAL 2 TIMES DAILY
Qty: 60 TABLET | Refills: 0 | Status: SHIPPED | OUTPATIENT
Start: 2025-02-11 | End: 2025-03-15

## 2025-02-11 NOTE — DISCHARGE INSTRUCTIONS
Lazara Liu,   Phone: [phone] - Sandra, Medical Assistant    PLEASE READ CAREFULLY BEFORE CONTACTING YOUR PROVIDER.    WE WORK COLLABORATIVELY AS A TEAM. CALLING MULTIPLE STAFF MEMBERS REGARDING THE SAME ISSUE WILL DELAY YOUR CARE.  MYCHART IS THE PREFERRED COMMUNICATION FOR ALL TEAM MEMBERS.  ________________________________________________________________________________________________________________________________________________________________________    After Surgery Instructions: TOTAL KNEE ARTHROPLASTY    The following is a general guide and may be applied to most patients that go home from the hospital after surgery. If you go to a rehab facility the timeline may deviate a little. Please do not hesitate to call our office for any questions or additional guidance. We will work with you to get the best experience from your new joint replacement.     WEEK 1  Relax…Don't Overdo it!  - WEIGHT BEARING: As tolerated, unless otherwise specified  - Get up once an hour for small activities. (get a drink, go to the bathroom, etc.)  - Keep the surgical site iced and elevated above your heart, if possible, while you are resting.  - You will have some light exercises given to you from the physical therapist in the hospital. Work diligently on your range of motion.  - BANDAGE CARE: REMOVE YOUR BANDAGE AT HOME 7 DAYS AFTER SURGERY    DO NOT place a pillow under the knee for comfort  DO NOT sleep or rest in a recliner if you are able to get in your bed  DO NOT wait until you start therapy to bend the knee.  The sooner the better! (work within your pain tolerance).    All of this may sound scary but knees can get stiff easily and we want you to get your range of motion back as quickly as possible!    SWELLING AND BRUISING  BRUISING AND SWELLING AFTER SURGERY IS NORMAL. As the patient becomes more mobile the bruising and swelling will begin to migrate towards the ankle and foot and is usually noticed toward the  end of the day.    WEEK 2-3  You will have a prescription for physical therapy given to you or electronically prescribed and you should start outpatient therapy 7-10 days after your operation. Be sure to do the home exercises on the days you are not attending physical therapy.    - Continue to progress walking as tolerated.   - Continue to work on range of motion exercises at home with a goal of 0-120 degrees by 12 weeks post operative  - Continue to use ice for soreness on the surgical site  - You may wean from your walker to a cane when you feel safe and comfortable to do so. Your physical therapist will also be helpful with progressing your assistive device.   - Be careful with bending and twisting - If it hurts, stop!!    FOLLOW UP  - Your first post-operative visit with Dr. Lazara Liu should have been scheduled already. Please call [phone] for appointment questions  - You do not need new xrays for this visit  - Don't be nervous! This visit is to see how you are doing and make sure your incision is healing nicely and have begun working with physical therapy.       MEDICATION REFILLS - Please call main office number: [phone]    You will not receive a call indicating that your prescription has been filled.  Please contact your pharmacy with any questions.    Medication refills will be filled Monday-Friday 7am to 1pm ONLY. Please call the office or send a [employer] message for a refill request.  Any requests received outside of this timeframe will be handled on the next business day.  The office staff and orthopedic nurses cannot refill medications; messages should be left directly through the office or via my chart.  Please do not call multiple times or call other members of the care team for medication needs, this will cause the refill to take longer.    Per State and Institutional policy, pain medications can only be refilled every 7 days for up to six weeks following surgery.    DRIVING & TRAVEL  AFTER SURGERY   Patients should anticipate waiting at least 3-6 weeks before traveling long distances after surgery.  At minimum you cannot be using your walker before considering driving and you cannot take any narcotic medications prior to driving. You will need to stop to walk around ever 1 hour during your travel to help with blood clot prevention.  Please call the office or your joint nurse to discuss prior to post-surgical travel.  Patients may not drive until cleared by the joint nurse or the office.    DENTAL PROCEDURES & CLEANINGS  You must wait a minimum of 3 months for elective dental appointments (if possible, we understand emergencies happen), including routine cleanings or dental work including bridges, crowns, extractions, etc..  For any dental visit - cleaning or dental procedures - patients must take an antibiotic 1 hour before the appointment.  Antibiotics are a lifelong need before dental appointments.  The antibiotic prescribed will be based on each patient's allergies.    WOUND CARE  You will have an ace wrap on your leg put on during surgery. This compression wrap is for comfort and may be removed 24 hours after surgery. You may continue to use compression throughout your recovery if this is comfortable for you.  You have a waterproof bandage on your wound and may shower with this on. The waterproof bandage is to remain in place for 7 days. You may remove it yourself. You may leave your incision open to air after the bandage has been removed.  Under your waterproof bandage you have a mesh and glue dressin in place. Do not peel this off. This will be on for 3-4 weeks. You may trim the edges as they peel off on their own. You can continue to shower with this on. Let the water run freely over your incision when showering and do not scrub.   You may shower upon discharging from the hospital.  Soap and water is permitted to run over the surgical dressing.  Do not scrub directly over these items.  DO  NOT soak your incision in a bath, hot tub, pool or pond/lake for a minimum of 3 weeks following your surgery.  DO NOT use lotions, creams, ointments on your wound for a minimum of 3 weeks following your surgery. At that time you may use vitamin E to assist with softening of your incision.    NUMBNESS & CLICKING  Audible clicking with movement or exercises is considered normal following joint replacement.  You may also feel decreased sensation or numbness near the incision site.  These are usually normal and may or may not fade over time.     RESTARTING HOME ROUTINE - DIET & MEDICATIONS  Post-operative constipation can result due to a combination of inactivity, anesthesia and pain medication. To help prevent this, you should increase your water and fiber intake. Physical activity such as walking will also help stimulate the bowels.   You may resume your normal diet when you discharge home.  Choose foods that help promote good bowel habits and prevent constipation, such as foods high in fiber.  You may restart your home medications the following day after your surgery UNLESS you have been given alternate instructions.  Follow the instructions given to you on your hospital discharge instructions for more information regarding your home medications.    IN-HOME PHYSICAL THERAPY & OUTPATIENT PHYSICAL THERAPY  Continue the exercises you were given in the hospital until you have been seen by in-home therapy.  Make sure to provide a phone number with the ability for the home care staff to leave a message if you do not answer your phone.    Depending on your treatment plan you will begin outpatient or home therapy after surgery. This should be arranged through the office of hospital during discharge  FOR PATIENT DOING HOME THERAPY: Outpatient physical therapy following knee replacement surgery should begin 2-3 weeks after surgery.  You should call to schedule this appointment ASAP if not already scheduled before surgery.   Waiting until you are ready for outpatient physical therapy will cause a delay in your care.  You may choose any outpatient physical therapy location.  Call the office for an order if needed.    EMERGENCIES - WHEN TO CONTACT THE SURGEON'S OFFICE IMMEDIATELY  Fever >101 with chills that has been present for at least 48 hours.   Excessive bleeding from incision that will not slow down. A small amount of drainage is normal and expected.  Once pressure is applied and the area is covered, do not continue to check the area regularly.  This will remove pressure and bleeding will continue.  Leave in place for 4-6 hours.  Signs of infection of incision-excessive drainage that is soaking through your dressing (especially if it is pus-like), redness that is spreading out from the edges of your incision, or increased warmth around the area.  Excruciating pain for which the pain medication, taken as instructed, is not helping.  Severe calf pain.  Go directly to the emergency room or call 911, if you are experiencing chest pain or difficulty breathing.      ICE & COLD THERAPY INSTRUCTIONS    To assist with pain control and post-op swelling, you should be using ice regularly throughout recovery, especially for the first 6 weeks, regardless of the cold therapy method you use.      Always make sure there is a layer of protection between the cold pad and your skin.    If you are using ICE PACKS or GEL PACKS, you will need to alternate 20 minutes on, 20 minutes off twice per hour.    If you are using an ICE MACHINE, please follow the provided ice machine instructions.  These devices differ from ice or ice packs whereas the mechanism circulates water through tubing and a pad to provide longer periods of cold therapy to the desired site.  You can use your cold devices around the clock for optimal comfort.  We recommend using cold therapy after working with therapy or completing exercises on your own.  There is no set schedule in which  you must follow while using cold therapy.  Below are a few points to remember when using a cold therapy device:    You do not need to need to use the 20 on, 20 off method.  Detach the pad from the cooler and ambulate at least once every hour.  You can check your skin under the pad at this time.  You may wear the cold therapy device during periods of sleep including overnight.  If you wake up during the night, you can check the skin at this time.  You do not need to wake up specifically to perform skin checks.  Empty the cooler and pad when device is not in use.  Follow 's instructions for cleaning your cold therapy device.      DISCHARGE MEDICATIONS - Please reference the sample schedule on the reverse side for instructions on how to best schedule medications.    PAIN MEDICATION    _______ Oxycodone   Oxycodone has been prescribed for post-operative pain control. Take one 5 mg tablet every 6 hours for pain. Alternate with Tramadol. See medication example sheet. This medication will only be refilled ONCE every 7 days for a period of 6 weeks. After 6 weeks, you will transition to acetaminophen (Tylenol) and over -the- counter anti-inflammatories such as Ibuprofen, Advil or Aleve in conjunction with ICE.    Side effects may be constipation and nausea, vomiting, sleepiness, dizziness, lightheadedness, headache, blurred vision, dry mouth sweating, itching (if you have itching, over-the -counter Benadryl can be used as needed).   You may NOT operate a motor vehicle while taking this medication or have been cleared by your surgeon or PA.     ________ Tramadol   Tramadol has been prescribed for post-operative pain control. Take one 50 mg tablet every 6 hours for pain. Alternate with Oxycodone. See medication example sheet. This medication will only be refilled ONCE every 7 days for a period of 6 weeks. After 6 weeks, you will transition to acetaminophen (Tylenol) and over- the- counter anti-inflammatories such  as Ibuprofen, Advil or Aleve in conjunction with ICE.    Side effects may be constipation and nausea, vomiting, sleepiness, dizziness, lightheadedness, headache, blurred vision, dry mouth, sweating, itching (if you have itching, over-the -counter Benadryl can be used as needed).   You may NOT operate a motor vehicle while taking this medication or have been cleared by your surgeon or PA.    NON-NARCOTIC PAIN MEDICATION     _________ Celecoxib (Celebrex) or Meloxicam (Mobic) - Prescription anti-inflammatory medication   One of these will be prescribed (if you are able to take it) as an adjunct anti-inflammatory to assist in pain control. Take one twice daily for 4 weeks. See medication example sheet. You will not receive refills on this medication.   Side effects may include nausea or upset stomach    _________ Acetaminophen (Tylenol)   Acetaminophen has been prescribed as an adjunct for pain control. Take two 500 mg tablet every 6-8 hours for 4 weeks. See medication example sheet. No refills will be given after initial prescription.   Side effects may include nausea, heartburn, drowsiness, and headache.    _________Cyclobenzaprine (Flexeril)   This is a muscle relaxer that will be prescribed    Take this AS NEEDED per instructions on bottle   This will be only prescribed once and is available to help with muscle spasms. There will be no refills of this medication    BLOOD THINNER    __________ Aspirin or Other Blood Thinner   Aspirin or another medication has been prescribed as a blood thinner to prevent blood clots in your leg or lungs. Take as prescribed on the bottle for 4 weeks. You will not receive a refill on this medication.   Do not take this medication if you are on another blood thinner.    ANTI NAUSEA    __________ Pantoprazole   Pantoprazole has been prescribed to help with nausea and protect your stomach while taking pain medication. Take one 40 mg tablet once daily for 4 weeks. You will not receive a  refill on this medication.    ANTIBIOTIC     If you are deemed “high risk” for infection after surgery and antibiotic will be prescribed. Please take this as directed for one week.     STOOL SOFTENERS    __________ Senna   Post-operative constipation can result due to a combination of inactivity, anesthesia and pain medication. To help prevent this, you should increase your water and fiber intake. Physical activity such as walking will also help stimulate the bowels.    Senna has been prescribed to help with constipation while on Oxycodone and Tramadol. Take one tablet twice daily for 4 weeks. No refills will be given.   You may also take Miralax in combination with Senna to prevent constipation.  This can be purchased over the counter at your local pharmacy.  Take as instructed on the bottle.   Pain Medication Refills -[phone] or MyChart- Monday through Friday 7am-1pm    Medication refills will be sent upon receipt of your request during the times listed above. Due to the high call volume, you will not receive a call confirming prescription refills; please do not call multiple times.  Prescription refills may take a few hours to process, you may follow up with your pharmacy for pickup availability.    SAMPLE              The times below are an example of how to organize medications to optimize pain control  Your actual medication schedule may vary based on your last dose taken IN THE HOSPITAL      Time 3:00am 6:00am 9:00am 12:00pm 3:00pm 6:00pm 9:00pm 12:00am   Medications Tramadol Acetaminophen (Tylenol)   Oxycodone  Miralax   Blood Thinner  Colace  Pantoprazole  Tramadol  Meloxicam Acetaminophen (Tylenol)   Oxycodone Tramadol Acetaminophen (Tylenol)   Oxycodone  Miralax Blood Thinner  Colace  Tramadol   Acetaminophen (Tylenol)   Oxycodone            You may begin to wean off the pain medication as your pain remains controlled with increased activity.  The schedules provided are meant to serve as an example.   You may wean off based on your pain control.  Please note that pain medications are not filled beyond 6 weeks after surgery.              The times below are an example of how to WEAN OFF medications WHILE CONTINUING TO OPTIMIZE PAIN CONTROL.  Your actual medication schedule may vary based on your last dose taken.  Time 12:00am 4:00am 8:00am 12:00pm 4:00pm 8:00pm   Med Tramadol Oxycodone   Tramadol Oxycodone Tramadol Oxycodone     Time 12:00am 6:00am 12:00pm 6:00pm   Med Tramadol Oxycodone   Tramadol Oxycodone     Time 12:00am 8:00am 4:00pm   Med Tramadol Oxycodone   Tramadol     Time 12:00am 12:00pm   Med Tramadol Tramadol       _________________________________________________________________________________________________________________________________________________________________________    OFFICE INFORMATION    OFFICE LOCATIONS    Location 1: [facility]  [address], [address]  [place] [address]  Office Number: [phone]    Location 2: [facility]  [address]  [facility] [address]  Office Number: [phone]    Location 3: [address] [address]  Office Number: [phone]

## 2025-02-12 ENCOUNTER — APPOINTMENT (OUTPATIENT)
Dept: CARDIOLOGY | Facility: HOSPITAL | Age: 75
End: 2025-02-12
Payer: MEDICARE

## 2025-02-12 ENCOUNTER — HOSPITAL ENCOUNTER (INPATIENT)
Facility: HOSPITAL | Age: 75
LOS: 1 days | Discharge: HOME | End: 2025-02-13
Attending: ORTHOPAEDIC SURGERY | Admitting: FAMILY MEDICINE
Payer: MEDICARE

## 2025-02-12 ENCOUNTER — APPOINTMENT (OUTPATIENT)
Dept: RADIOLOGY | Facility: HOSPITAL | Age: 75
End: 2025-02-12
Payer: MEDICARE

## 2025-02-12 DIAGNOSIS — I44.30 HEART BLOCK ATRIOVENTRICULAR: ICD-10-CM

## 2025-02-12 DIAGNOSIS — I49.9 ARRHYTHMIA: Primary | ICD-10-CM

## 2025-02-12 DIAGNOSIS — M17.12 PRIMARY OSTEOARTHRITIS OF LEFT KNEE: ICD-10-CM

## 2025-02-12 DIAGNOSIS — M17.11 PRIMARY OSTEOARTHRITIS OF RIGHT KNEE: ICD-10-CM

## 2025-02-12 DIAGNOSIS — M17.11 OSTEOARTHRITIS OF RIGHT KNEE, UNSPECIFIED OSTEOARTHRITIS TYPE: ICD-10-CM

## 2025-02-12 PROBLEM — T88.59XA DELAYED EMERGENCE FROM ANESTHESIA: Status: ACTIVE | Noted: 2025-02-12

## 2025-02-12 LAB
ALBUMIN SERPL BCP-MCNC: 4 G/DL (ref 3.4–5)
ALP SERPL-CCNC: 85 U/L (ref 33–136)
ALT SERPL W P-5'-P-CCNC: 16 U/L (ref 7–45)
ANION GAP SERPL CALC-SCNC: 11 MMOL/L (ref 10–20)
AST SERPL W P-5'-P-CCNC: 14 U/L (ref 9–39)
BASOPHILS # BLD AUTO: 0.03 X10*3/UL (ref 0–0.1)
BASOPHILS NFR BLD AUTO: 0.2 %
BILIRUB SERPL-MCNC: 0.4 MG/DL (ref 0–1.2)
BUN SERPL-MCNC: 16 MG/DL (ref 6–23)
CALCIUM SERPL-MCNC: 8.9 MG/DL (ref 8.6–10.3)
CARDIAC TROPONIN I PNL SERPL HS: 3 NG/L (ref 0–13)
CHLORIDE SERPL-SCNC: 101 MMOL/L (ref 98–107)
CO2 SERPL-SCNC: 27 MMOL/L (ref 21–32)
CREAT SERPL-MCNC: 0.71 MG/DL (ref 0.5–1.05)
EGFRCR SERPLBLD CKD-EPI 2021: 88 ML/MIN/1.73M*2
EOSINOPHIL # BLD AUTO: 0.01 X10*3/UL (ref 0–0.4)
EOSINOPHIL NFR BLD AUTO: 0.1 %
ERYTHROCYTE [DISTWIDTH] IN BLOOD BY AUTOMATED COUNT: 12.4 % (ref 11.5–14.5)
GLUCOSE SERPL-MCNC: 169 MG/DL (ref 74–99)
HCT VFR BLD AUTO: 39.4 % (ref 36–46)
HGB BLD-MCNC: 13 G/DL (ref 12–16)
IMM GRANULOCYTES # BLD AUTO: 0.07 X10*3/UL (ref 0–0.5)
IMM GRANULOCYTES NFR BLD AUTO: 0.6 % (ref 0–0.9)
LYMPHOCYTES # BLD AUTO: 0.48 X10*3/UL (ref 0.8–3)
LYMPHOCYTES NFR BLD AUTO: 4 %
MAGNESIUM SERPL-MCNC: 1.83 MG/DL (ref 1.6–2.4)
MCH RBC QN AUTO: 30.7 PG (ref 26–34)
MCHC RBC AUTO-ENTMCNC: 33 G/DL (ref 32–36)
MCV RBC AUTO: 93 FL (ref 80–100)
MONOCYTES # BLD AUTO: 0.11 X10*3/UL (ref 0.05–0.8)
MONOCYTES NFR BLD AUTO: 0.9 %
NEUTROPHILS # BLD AUTO: 11.39 X10*3/UL (ref 1.6–5.5)
NEUTROPHILS NFR BLD AUTO: 94.2 %
NRBC BLD-RTO: 0 /100 WBCS (ref 0–0)
PLATELET # BLD AUTO: 255 X10*3/UL (ref 150–450)
POTASSIUM SERPL-SCNC: 4.3 MMOL/L (ref 3.5–5.3)
PROT SERPL-MCNC: 6.6 G/DL (ref 6.4–8.2)
RBC # BLD AUTO: 4.24 X10*6/UL (ref 4–5.2)
SODIUM SERPL-SCNC: 135 MMOL/L (ref 136–145)
WBC # BLD AUTO: 12.1 X10*3/UL (ref 4.4–11.3)

## 2025-02-12 PROCEDURE — 2500000001 HC RX 250 WO HCPCS SELF ADMINISTERED DRUGS (ALT 637 FOR MEDICARE OP)

## 2025-02-12 PROCEDURE — 2500000005 HC RX 250 GENERAL PHARMACY W/O HCPCS: Performed by: ORTHOPAEDIC SURGERY

## 2025-02-12 PROCEDURE — 2780000003 HC OR 278 NO HCPCS: Performed by: ORTHOPAEDIC SURGERY

## 2025-02-12 PROCEDURE — 2500000004 HC RX 250 GENERAL PHARMACY W/ HCPCS (ALT 636 FOR OP/ED): Performed by: ANESTHESIOLOGY

## 2025-02-12 PROCEDURE — 73560 X-RAY EXAM OF KNEE 1 OR 2: CPT | Mod: RIGHT SIDE | Performed by: RADIOLOGY

## 2025-02-12 PROCEDURE — 83735 ASSAY OF MAGNESIUM: CPT

## 2025-02-12 PROCEDURE — 93010 ELECTROCARDIOGRAM REPORT: CPT | Performed by: INTERNAL MEDICINE

## 2025-02-12 PROCEDURE — 96375 TX/PRO/DX INJ NEW DRUG ADDON: CPT

## 2025-02-12 PROCEDURE — 2500000004 HC RX 250 GENERAL PHARMACY W/ HCPCS (ALT 636 FOR OP/ED): Performed by: ORTHOPAEDIC SURGERY

## 2025-02-12 PROCEDURE — 3600000018 HC OR TIME - INITIAL BASE CHARGE - PROCEDURE LEVEL SIX: Performed by: ORTHOPAEDIC SURGERY

## 2025-02-12 PROCEDURE — C1776 JOINT DEVICE (IMPLANTABLE): HCPCS | Performed by: ORTHOPAEDIC SURGERY

## 2025-02-12 PROCEDURE — 80053 COMPREHEN METABOLIC PANEL: CPT

## 2025-02-12 PROCEDURE — 93005 ELECTROCARDIOGRAM TRACING: CPT

## 2025-02-12 PROCEDURE — 64473 LWR XTR FSCL PLN BLK UNI NJX: CPT

## 2025-02-12 PROCEDURE — 2500000001 HC RX 250 WO HCPCS SELF ADMINISTERED DRUGS (ALT 637 FOR MEDICARE OP): Performed by: ANESTHESIOLOGY

## 2025-02-12 PROCEDURE — A4649 SURGICAL SUPPLIES: HCPCS | Performed by: ORTHOPAEDIC SURGERY

## 2025-02-12 PROCEDURE — 27447 TOTAL KNEE ARTHROPLASTY: CPT | Performed by: ORTHOPAEDIC SURGERY

## 2025-02-12 PROCEDURE — 2500000004 HC RX 250 GENERAL PHARMACY W/ HCPCS (ALT 636 FOR OP/ED)

## 2025-02-12 PROCEDURE — G0378 HOSPITAL OBSERVATION PER HR: HCPCS

## 2025-02-12 PROCEDURE — 3600000017 HC OR TIME - EACH INCREMENTAL 1 MINUTE - PROCEDURE LEVEL SIX: Performed by: ORTHOPAEDIC SURGERY

## 2025-02-12 PROCEDURE — 27447 TOTAL KNEE ARTHROPLASTY: CPT

## 2025-02-12 PROCEDURE — 99223 1ST HOSP IP/OBS HIGH 75: CPT

## 2025-02-12 PROCEDURE — 3700000001 HC GENERAL ANESTHESIA TIME - INITIAL BASE CHARGE: Performed by: ORTHOPAEDIC SURGERY

## 2025-02-12 PROCEDURE — 73560 X-RAY EXAM OF KNEE 1 OR 2: CPT | Mod: RT

## 2025-02-12 PROCEDURE — 7100000002 HC RECOVERY ROOM TIME - EACH INCREMENTAL 1 MINUTE: Performed by: ORTHOPAEDIC SURGERY

## 2025-02-12 PROCEDURE — 96365 THER/PROPH/DIAG IV INF INIT: CPT

## 2025-02-12 PROCEDURE — A6213 FOAM DRG >16<=48 SQ IN W/BDR: HCPCS | Performed by: ORTHOPAEDIC SURGERY

## 2025-02-12 PROCEDURE — 85025 COMPLETE CBC W/AUTO DIFF WBC: CPT

## 2025-02-12 PROCEDURE — 2500000004 HC RX 250 GENERAL PHARMACY W/ HCPCS (ALT 636 FOR OP/ED): Performed by: NURSE ANESTHETIST, CERTIFIED REGISTERED

## 2025-02-12 PROCEDURE — 7100000001 HC RECOVERY ROOM TIME - INITIAL BASE CHARGE: Performed by: ORTHOPAEDIC SURGERY

## 2025-02-12 PROCEDURE — 2720000007 HC OR 272 NO HCPCS: Performed by: ORTHOPAEDIC SURGERY

## 2025-02-12 PROCEDURE — 84484 ASSAY OF TROPONIN QUANT: CPT

## 2025-02-12 PROCEDURE — 2060000001 HC INTERMEDIATE ICU ROOM DAILY

## 2025-02-12 PROCEDURE — 2500000004 HC RX 250 GENERAL PHARMACY W/ HCPCS (ALT 636 FOR OP/ED): Performed by: LICENSED PRACTICAL NURSE

## 2025-02-12 PROCEDURE — 2500000005 HC RX 250 GENERAL PHARMACY W/O HCPCS

## 2025-02-12 PROCEDURE — 3700000002 HC GENERAL ANESTHESIA TIME - EACH INCREMENTAL 1 MINUTE: Performed by: ORTHOPAEDIC SURGERY

## 2025-02-12 PROCEDURE — 7100000024 HC EXTENDED STAY RECOVERY PER MINUTE- PACU: Performed by: ORTHOPAEDIC SURGERY

## 2025-02-12 PROCEDURE — 2500000001 HC RX 250 WO HCPCS SELF ADMINISTERED DRUGS (ALT 637 FOR MEDICARE OP): Performed by: ORTHOPAEDIC SURGERY

## 2025-02-12 PROCEDURE — 96376 TX/PRO/DX INJ SAME DRUG ADON: CPT

## 2025-02-12 PROCEDURE — 0SRC0JZ REPLACEMENT OF RIGHT KNEE JOINT WITH SYNTHETIC SUBSTITUTE, OPEN APPROACH: ICD-10-PCS | Performed by: ORTHOPAEDIC SURGERY

## 2025-02-12 PROCEDURE — 96366 THER/PROPH/DIAG IV INF ADDON: CPT

## 2025-02-12 PROCEDURE — 36415 COLL VENOUS BLD VENIPUNCTURE: CPT

## 2025-02-12 DEVICE — TIBIAL COMPONENT
Type: IMPLANTABLE DEVICE | Site: KNEE | Status: FUNCTIONAL
Brand: TRIATHLON

## 2025-02-12 DEVICE — PATELLA
Type: IMPLANTABLE DEVICE | Site: KNEE | Status: FUNCTIONAL
Brand: TRIATHLON

## 2025-02-12 DEVICE — TIBIAL BEARING INSERT - CS
Type: IMPLANTABLE DEVICE | Site: KNEE | Status: FUNCTIONAL
Brand: TRIATHLON

## 2025-02-12 DEVICE — DISPOSABLE FLUTED HEADLESS PINS
Type: IMPLANTABLE DEVICE | Site: KNEE | Status: NON-FUNCTIONAL
Brand: INSTRUMENT

## 2025-02-12 DEVICE — CRUCIATE RETAINING FEMORAL
Type: IMPLANTABLE DEVICE | Site: KNEE | Status: FUNCTIONAL
Brand: TRIATHLON

## 2025-02-12 RX ORDER — TRANEXAMIC ACID 100 MG/ML
1000 INJECTION, SOLUTION INTRAVENOUS 2 TIMES DAILY
Status: COMPLETED | OUTPATIENT
Start: 2025-02-12 | End: 2025-02-12

## 2025-02-12 RX ORDER — METOCLOPRAMIDE HYDROCHLORIDE 5 MG/ML
10 INJECTION INTRAMUSCULAR; INTRAVENOUS ONCE
Status: COMPLETED | OUTPATIENT
Start: 2025-02-12 | End: 2025-02-12

## 2025-02-12 RX ORDER — DIPHENHYDRAMINE HYDROCHLORIDE 50 MG/ML
12.5 INJECTION INTRAMUSCULAR; INTRAVENOUS ONCE AS NEEDED
Status: DISCONTINUED | OUTPATIENT
Start: 2025-02-12 | End: 2025-02-12 | Stop reason: HOSPADM

## 2025-02-12 RX ORDER — NALOXONE HYDROCHLORIDE 0.4 MG/ML
0.2 INJECTION, SOLUTION INTRAMUSCULAR; INTRAVENOUS; SUBCUTANEOUS EVERY 5 MIN PRN
Status: DISCONTINUED | OUTPATIENT
Start: 2025-02-12 | End: 2025-02-13 | Stop reason: HOSPADM

## 2025-02-12 RX ORDER — OXYCODONE HYDROCHLORIDE 10 MG/1
10 TABLET ORAL EVERY 4 HOURS PRN
Status: DISCONTINUED | OUTPATIENT
Start: 2025-02-12 | End: 2025-02-13 | Stop reason: HOSPADM

## 2025-02-12 RX ORDER — LIDOCAINE HCL/PF 100 MG/5ML
SYRINGE (ML) INTRAVENOUS AS NEEDED
Status: DISCONTINUED | OUTPATIENT
Start: 2025-02-12 | End: 2025-02-12

## 2025-02-12 RX ORDER — LIDOCAINE HYDROCHLORIDE 10 MG/ML
0.1 INJECTION, SOLUTION EPIDURAL; INFILTRATION; INTRACAUDAL; PERINEURAL ONCE
Status: DISCONTINUED | OUTPATIENT
Start: 2025-02-12 | End: 2025-02-12 | Stop reason: HOSPADM

## 2025-02-12 RX ORDER — SODIUM CHLORIDE 0.9 G/100ML
INJECTION, SOLUTION IRRIGATION AS NEEDED
Status: DISCONTINUED | OUTPATIENT
Start: 2025-02-12 | End: 2025-02-12 | Stop reason: HOSPADM

## 2025-02-12 RX ORDER — OXYCODONE AND ACETAMINOPHEN 5; 325 MG/1; MG/1
1 TABLET ORAL EVERY 4 HOURS PRN
Status: DISCONTINUED | OUTPATIENT
Start: 2025-02-12 | End: 2025-02-12 | Stop reason: HOSPADM

## 2025-02-12 RX ORDER — BUPIVACAINE HYDROCHLORIDE 7.5 MG/ML
INJECTION, SOLUTION EPIDURAL; RETROBULBAR AS NEEDED
Status: DISCONTINUED | OUTPATIENT
Start: 2025-02-12 | End: 2025-02-12

## 2025-02-12 RX ORDER — ONDANSETRON HYDROCHLORIDE 2 MG/ML
4 INJECTION, SOLUTION INTRAVENOUS ONCE
Status: COMPLETED | OUTPATIENT
Start: 2025-02-12 | End: 2025-02-12

## 2025-02-12 RX ORDER — LABETALOL HYDROCHLORIDE 5 MG/ML
5 INJECTION, SOLUTION INTRAVENOUS ONCE AS NEEDED
Status: DISCONTINUED | OUTPATIENT
Start: 2025-02-12 | End: 2025-02-12 | Stop reason: HOSPADM

## 2025-02-12 RX ORDER — METOCLOPRAMIDE 5 MG/1
10 TABLET ORAL EVERY 6 HOURS PRN
Status: DISCONTINUED | OUTPATIENT
Start: 2025-02-12 | End: 2025-02-13 | Stop reason: HOSPADM

## 2025-02-12 RX ORDER — ALBUTEROL SULFATE 0.83 MG/ML
2.5 SOLUTION RESPIRATORY (INHALATION) ONCE AS NEEDED
Status: DISCONTINUED | OUTPATIENT
Start: 2025-02-12 | End: 2025-02-12 | Stop reason: HOSPADM

## 2025-02-12 RX ORDER — PROPOFOL 10 MG/ML
INJECTION, EMULSION INTRAVENOUS CONTINUOUS PRN
Status: DISCONTINUED | OUTPATIENT
Start: 2025-02-12 | End: 2025-02-12

## 2025-02-12 RX ORDER — SODIUM CHLORIDE, SODIUM LACTATE, POTASSIUM CHLORIDE, CALCIUM CHLORIDE 600; 310; 30; 20 MG/100ML; MG/100ML; MG/100ML; MG/100ML
100 INJECTION, SOLUTION INTRAVENOUS CONTINUOUS
Status: DISCONTINUED | OUTPATIENT
Start: 2025-02-12 | End: 2025-02-12

## 2025-02-12 RX ORDER — MEPERIDINE HYDROCHLORIDE 25 MG/ML
12.5 INJECTION INTRAMUSCULAR; INTRAVENOUS; SUBCUTANEOUS EVERY 10 MIN PRN
Status: DISCONTINUED | OUTPATIENT
Start: 2025-02-12 | End: 2025-02-12 | Stop reason: HOSPADM

## 2025-02-12 RX ORDER — ROPIVACAINE/EPI/CLONIDINE/KET 2.46-0.005
SYRINGE (ML) INJECTION AS NEEDED
Status: DISCONTINUED | OUTPATIENT
Start: 2025-02-12 | End: 2025-02-12 | Stop reason: HOSPADM

## 2025-02-12 RX ORDER — KETOROLAC TROMETHAMINE 30 MG/ML
15 INJECTION, SOLUTION INTRAMUSCULAR; INTRAVENOUS EVERY 6 HOURS
Status: COMPLETED | OUTPATIENT
Start: 2025-02-12 | End: 2025-02-13

## 2025-02-12 RX ORDER — ONDANSETRON HYDROCHLORIDE 2 MG/ML
4 INJECTION, SOLUTION INTRAVENOUS ONCE AS NEEDED
Status: DISCONTINUED | OUTPATIENT
Start: 2025-02-12 | End: 2025-02-12 | Stop reason: HOSPADM

## 2025-02-12 RX ORDER — VANCOMYCIN HYDROCHLORIDE 1 G/20ML
INJECTION, POWDER, LYOPHILIZED, FOR SOLUTION INTRAVENOUS AS NEEDED
Status: DISCONTINUED | OUTPATIENT
Start: 2025-02-12 | End: 2025-02-12 | Stop reason: HOSPADM

## 2025-02-12 RX ORDER — ACETAMINOPHEN 325 MG/1
650 TABLET ORAL EVERY 6 HOURS SCHEDULED
Status: DISCONTINUED | OUTPATIENT
Start: 2025-02-12 | End: 2025-02-13 | Stop reason: HOSPADM

## 2025-02-12 RX ORDER — SODIUM CITRATE AND CITRIC ACID MONOHYDRATE 334; 500 MG/5ML; MG/5ML
30 SOLUTION ORAL ONCE
Status: COMPLETED | OUTPATIENT
Start: 2025-02-12 | End: 2025-02-12

## 2025-02-12 RX ORDER — SODIUM CHLORIDE, SODIUM LACTATE, POTASSIUM CHLORIDE, CALCIUM CHLORIDE 600; 310; 30; 20 MG/100ML; MG/100ML; MG/100ML; MG/100ML
100 INJECTION, SOLUTION INTRAVENOUS CONTINUOUS
Status: DISCONTINUED | OUTPATIENT
Start: 2025-02-12 | End: 2025-02-13 | Stop reason: HOSPADM

## 2025-02-12 RX ORDER — VANCOMYCIN HYDROCHLORIDE 1 G/20ML
1 INJECTION, POWDER, LYOPHILIZED, FOR SOLUTION INTRAVENOUS ONCE
Status: DISCONTINUED | OUTPATIENT
Start: 2025-02-12 | End: 2025-02-12 | Stop reason: HOSPADM

## 2025-02-12 RX ORDER — CYCLOBENZAPRINE HCL 10 MG
5 TABLET ORAL 3 TIMES DAILY PRN
Status: DISCONTINUED | OUTPATIENT
Start: 2025-02-12 | End: 2025-02-13 | Stop reason: HOSPADM

## 2025-02-12 RX ORDER — MORPHINE SULFATE 2 MG/ML
2 INJECTION, SOLUTION INTRAMUSCULAR; INTRAVENOUS EVERY 5 MIN PRN
Status: DISCONTINUED | OUTPATIENT
Start: 2025-02-12 | End: 2025-02-12 | Stop reason: HOSPADM

## 2025-02-12 RX ORDER — FERROUS SULFATE 325(65) MG
65 TABLET ORAL DAILY
Status: DISCONTINUED | OUTPATIENT
Start: 2025-02-13 | End: 2025-02-13 | Stop reason: HOSPADM

## 2025-02-12 RX ORDER — ACETAMINOPHEN 500 MG
5 TABLET ORAL NIGHTLY PRN
Status: DISCONTINUED | OUTPATIENT
Start: 2025-02-12 | End: 2025-02-13 | Stop reason: HOSPADM

## 2025-02-12 RX ORDER — CEFAZOLIN SODIUM 2 G/100ML
2 INJECTION, SOLUTION INTRAVENOUS ONCE
Status: DISCONTINUED | OUTPATIENT
Start: 2025-02-12 | End: 2025-02-13 | Stop reason: HOSPADM

## 2025-02-12 RX ORDER — HYDRALAZINE HYDROCHLORIDE 20 MG/ML
5 INJECTION INTRAMUSCULAR; INTRAVENOUS EVERY 30 MIN PRN
Status: DISCONTINUED | OUTPATIENT
Start: 2025-02-12 | End: 2025-02-12 | Stop reason: HOSPADM

## 2025-02-12 RX ORDER — CELECOXIB 200 MG/1
200 CAPSULE ORAL ONCE
Status: COMPLETED | OUTPATIENT
Start: 2025-02-12 | End: 2025-02-12

## 2025-02-12 RX ORDER — CEFAZOLIN SODIUM 2 G/100ML
2 INJECTION, SOLUTION INTRAVENOUS EVERY 6 HOURS
Status: DISCONTINUED | OUTPATIENT
Start: 2025-02-12 | End: 2025-02-12 | Stop reason: SDUPTHER

## 2025-02-12 RX ORDER — LIDOCAINE HYDROCHLORIDE AND EPINEPHRINE 10; 10 UG/ML; MG/ML
INJECTION, SOLUTION INFILTRATION; PERINEURAL AS NEEDED
Status: DISCONTINUED | OUTPATIENT
Start: 2025-02-12 | End: 2025-02-12

## 2025-02-12 RX ORDER — OXYCODONE HYDROCHLORIDE 5 MG/1
10 TABLET ORAL ONCE
Status: COMPLETED | OUTPATIENT
Start: 2025-02-12 | End: 2025-02-12

## 2025-02-12 RX ORDER — DROPERIDOL 2.5 MG/ML
0.62 INJECTION, SOLUTION INTRAMUSCULAR; INTRAVENOUS ONCE AS NEEDED
Status: DISCONTINUED | OUTPATIENT
Start: 2025-02-12 | End: 2025-02-12 | Stop reason: HOSPADM

## 2025-02-12 RX ORDER — PANTOPRAZOLE SODIUM 40 MG/1
40 TABLET, DELAYED RELEASE ORAL
Status: DISCONTINUED | OUTPATIENT
Start: 2025-02-13 | End: 2025-02-13 | Stop reason: HOSPADM

## 2025-02-12 RX ORDER — FAMOTIDINE 10 MG/ML
20 INJECTION, SOLUTION INTRAVENOUS ONCE
Status: COMPLETED | OUTPATIENT
Start: 2025-02-12 | End: 2025-02-12

## 2025-02-12 RX ORDER — CEFAZOLIN SODIUM 2 G/100ML
2 INJECTION, SOLUTION INTRAVENOUS EVERY 8 HOURS
Status: COMPLETED | OUTPATIENT
Start: 2025-02-12 | End: 2025-02-13

## 2025-02-12 RX ORDER — OXYCODONE HYDROCHLORIDE 5 MG/1
5 TABLET ORAL EVERY 6 HOURS PRN
Status: DISCONTINUED | OUTPATIENT
Start: 2025-02-12 | End: 2025-02-13 | Stop reason: HOSPADM

## 2025-02-12 RX ORDER — ACETAMINOPHEN 325 MG/1
975 TABLET ORAL ONCE
Status: COMPLETED | OUTPATIENT
Start: 2025-02-12 | End: 2025-02-12

## 2025-02-12 RX ORDER — BISACODYL 5 MG
10 TABLET, DELAYED RELEASE (ENTERIC COATED) ORAL DAILY PRN
Status: DISCONTINUED | OUTPATIENT
Start: 2025-02-12 | End: 2025-02-13 | Stop reason: HOSPADM

## 2025-02-12 RX ORDER — METOCLOPRAMIDE HYDROCHLORIDE 5 MG/ML
10 INJECTION INTRAMUSCULAR; INTRAVENOUS EVERY 6 HOURS PRN
Status: DISCONTINUED | OUTPATIENT
Start: 2025-02-12 | End: 2025-02-13 | Stop reason: HOSPADM

## 2025-02-12 RX ORDER — ONDANSETRON 4 MG/1
4 TABLET, ORALLY DISINTEGRATING ORAL EVERY 8 HOURS PRN
Status: DISCONTINUED | OUTPATIENT
Start: 2025-02-12 | End: 2025-02-13 | Stop reason: HOSPADM

## 2025-02-12 RX ORDER — SENNOSIDES 8.6 MG/1
2 TABLET ORAL 2 TIMES DAILY
Status: DISCONTINUED | OUTPATIENT
Start: 2025-02-12 | End: 2025-02-13 | Stop reason: HOSPADM

## 2025-02-12 RX ORDER — ASPIRIN 81 MG/1
81 TABLET ORAL 2 TIMES DAILY
Status: DISCONTINUED | OUTPATIENT
Start: 2025-02-12 | End: 2025-02-13 | Stop reason: HOSPADM

## 2025-02-12 RX ORDER — FENTANYL CITRATE 50 UG/ML
INJECTION, SOLUTION INTRAMUSCULAR; INTRAVENOUS AS NEEDED
Status: DISCONTINUED | OUTPATIENT
Start: 2025-02-12 | End: 2025-02-12

## 2025-02-12 RX ORDER — CEFAZOLIN SODIUM 2 G/100ML
2 INJECTION, SOLUTION INTRAVENOUS ONCE
Status: COMPLETED | OUTPATIENT
Start: 2025-02-12 | End: 2025-02-12

## 2025-02-12 RX ORDER — MIDAZOLAM HYDROCHLORIDE 1 MG/ML
INJECTION, SOLUTION INTRAMUSCULAR; INTRAVENOUS AS NEEDED
Status: DISCONTINUED | OUTPATIENT
Start: 2025-02-12 | End: 2025-02-12

## 2025-02-12 RX ORDER — ROPIVACAINE HYDROCHLORIDE 5 MG/ML
INJECTION, SOLUTION EPIDURAL; INFILTRATION; PERINEURAL AS NEEDED
Status: DISCONTINUED | OUTPATIENT
Start: 2025-02-12 | End: 2025-02-12

## 2025-02-12 RX ORDER — ONDANSETRON HYDROCHLORIDE 2 MG/ML
4 INJECTION, SOLUTION INTRAVENOUS EVERY 8 HOURS PRN
Status: DISCONTINUED | OUTPATIENT
Start: 2025-02-12 | End: 2025-02-13 | Stop reason: HOSPADM

## 2025-02-12 RX ORDER — ONDANSETRON HYDROCHLORIDE 2 MG/ML
INJECTION, SOLUTION INTRAVENOUS AS NEEDED
Status: DISCONTINUED | OUTPATIENT
Start: 2025-02-12 | End: 2025-02-12

## 2025-02-12 RX ADMIN — LIDOCAINE HYDROCHLORIDE 100 MG: 20 INJECTION INTRAVENOUS at 10:59

## 2025-02-12 RX ADMIN — CEFAZOLIN SODIUM 2 G: 2 INJECTION, SOLUTION INTRAVENOUS at 23:24

## 2025-02-12 RX ADMIN — FENTANYL CITRATE 50 MCG: 50 INJECTION INTRAMUSCULAR; INTRAVENOUS at 11:14

## 2025-02-12 RX ADMIN — METOCLOPRAMIDE 10 MG: 5 INJECTION, SOLUTION INTRAMUSCULAR; INTRAVENOUS at 09:28

## 2025-02-12 RX ADMIN — FAMOTIDINE 20 MG: 10 INJECTION, SOLUTION INTRAVENOUS at 09:27

## 2025-02-12 RX ADMIN — CEFAZOLIN SODIUM 2 G: 2 INJECTION, SOLUTION INTRAVENOUS at 16:22

## 2025-02-12 RX ADMIN — POVIDONE-IODINE 1 APPLICATION: 5 SOLUTION TOPICAL at 09:19

## 2025-02-12 RX ADMIN — ONDANSETRON 4 MG: 2 INJECTION INTRAMUSCULAR; INTRAVENOUS at 09:29

## 2025-02-12 RX ADMIN — LIDOCAINE HYDROCHLORIDE,EPINEPHRINE BITARTRATE 5 ML: 10; .01 INJECTION, SOLUTION INFILTRATION; PERINEURAL at 10:31

## 2025-02-12 RX ADMIN — SODIUM CHLORIDE, POTASSIUM CHLORIDE, SODIUM LACTATE AND CALCIUM CHLORIDE 100 ML/HR: 600; 310; 30; 20 INJECTION, SOLUTION INTRAVENOUS at 17:43

## 2025-02-12 RX ADMIN — FENTANYL CITRATE 50 MCG: 50 INJECTION INTRAMUSCULAR; INTRAVENOUS at 10:19

## 2025-02-12 RX ADMIN — PROPOFOL 100 MCG/KG/MIN: 10 INJECTION, EMULSION INTRAVENOUS at 10:59

## 2025-02-12 RX ADMIN — TRANEXAMIC ACID 1000 MG: 1 INJECTION, SOLUTION INTRAVENOUS at 12:15

## 2025-02-12 RX ADMIN — ACETAMINOPHEN 975 MG: 325 TABLET ORAL at 09:26

## 2025-02-12 RX ADMIN — CEFAZOLIN SODIUM 2 G: 2 INJECTION, SOLUTION INTRAVENOUS at 10:39

## 2025-02-12 RX ADMIN — DEXAMETHASONE SODIUM PHOSPHATE 4 MG: 4 INJECTION INTRA-ARTICULAR; INTRALESIONAL; INTRAMUSCULAR; INTRAVENOUS; SOFT TISSUE at 10:34

## 2025-02-12 RX ADMIN — TRANEXAMIC ACID 1000 MG: 1 INJECTION, SOLUTION INTRAVENOUS at 11:02

## 2025-02-12 RX ADMIN — ACETAMINOPHEN 650 MG: 325 TABLET ORAL at 23:24

## 2025-02-12 RX ADMIN — MIDAZOLAM 2 MG: 1 INJECTION INTRAMUSCULAR; INTRAVENOUS at 10:19

## 2025-02-12 RX ADMIN — CELECOXIB 200 MG: 200 CAPSULE ORAL at 09:27

## 2025-02-12 RX ADMIN — SODIUM CITRATE AND CITRIC ACID MONOHYDRATE 30 ML: 500; 334 SOLUTION ORAL at 09:35

## 2025-02-12 RX ADMIN — KETOROLAC TROMETHAMINE 15 MG: 30 INJECTION, SOLUTION INTRAMUSCULAR at 17:39

## 2025-02-12 RX ADMIN — LIDOCAINE HYDROCHLORIDE,EPINEPHRINE BITARTRATE 5 ML: 10; .01 INJECTION, SOLUTION INFILTRATION; PERINEURAL at 10:34

## 2025-02-12 RX ADMIN — PROPOFOL 5 MG: 10 INJECTION, EMULSION INTRAVENOUS at 10:58

## 2025-02-12 RX ADMIN — OXYCODONE HYDROCHLORIDE 10 MG: 5 TABLET ORAL at 09:28

## 2025-02-12 RX ADMIN — BUPIVACAINE HYDROCHLORIDE 0.08 ML: 7.5 INJECTION, SOLUTION EPIDURAL; RETROBULBAR at 10:57

## 2025-02-12 RX ADMIN — DEXAMETHASONE SODIUM PHOSPHATE 4 MG: 4 INJECTION INTRA-ARTICULAR; INTRALESIONAL; INTRAMUSCULAR; INTRAVENOUS; SOFT TISSUE at 10:31

## 2025-02-12 RX ADMIN — ROPIVACAINE HYDROCHLORIDE 20 ML: 5 INJECTION, SOLUTION EPIDURAL; INFILTRATION; PERINEURAL at 10:34

## 2025-02-12 RX ADMIN — ASPIRIN 81 MG: 81 TABLET, COATED ORAL at 20:51

## 2025-02-12 RX ADMIN — Medication 2 L/MIN: at 15:30

## 2025-02-12 RX ADMIN — ACETAMINOPHEN 650 MG: 325 TABLET ORAL at 17:40

## 2025-02-12 RX ADMIN — ROPIVACAINE HYDROCHLORIDE 20 ML: 5 INJECTION, SOLUTION EPIDURAL; INFILTRATION; PERINEURAL at 10:31

## 2025-02-12 RX ADMIN — KETOROLAC TROMETHAMINE 15 MG: 30 INJECTION, SOLUTION INTRAMUSCULAR at 22:31

## 2025-02-12 RX ADMIN — SODIUM CHLORIDE, POTASSIUM CHLORIDE, SODIUM LACTATE AND CALCIUM CHLORIDE: 600; 310; 30; 20 INJECTION, SOLUTION INTRAVENOUS at 10:39

## 2025-02-12 RX ADMIN — ONDANSETRON 4 MG: 2 INJECTION INTRAMUSCULAR; INTRAVENOUS at 12:07

## 2025-02-12 SDOH — SOCIAL STABILITY: SOCIAL INSECURITY
WITHIN THE LAST YEAR, HAVE YOU BEEN RAPED OR FORCED TO HAVE ANY KIND OF SEXUAL ACTIVITY BY YOUR PARTNER OR EX-PARTNER?: NO

## 2025-02-12 SDOH — SOCIAL STABILITY: SOCIAL INSECURITY: WITHIN THE LAST YEAR, HAVE YOU BEEN AFRAID OF YOUR PARTNER OR EX-PARTNER?: NO

## 2025-02-12 SDOH — SOCIAL STABILITY: SOCIAL INSECURITY: ARE YOU OR HAVE YOU BEEN THREATENED OR ABUSED PHYSICALLY, EMOTIONALLY, OR SEXUALLY BY ANYONE?: NO

## 2025-02-12 SDOH — SOCIAL STABILITY: SOCIAL INSECURITY: WITHIN THE LAST YEAR, HAVE YOU BEEN HUMILIATED OR EMOTIONALLY ABUSED IN OTHER WAYS BY YOUR PARTNER OR EX-PARTNER?: NO

## 2025-02-12 SDOH — SOCIAL STABILITY: SOCIAL INSECURITY: ABUSE: ADULT

## 2025-02-12 SDOH — SOCIAL STABILITY: SOCIAL INSECURITY: ARE THERE ANY APPARENT SIGNS OF INJURIES/BEHAVIORS THAT COULD BE RELATED TO ABUSE/NEGLECT?: NO

## 2025-02-12 SDOH — ECONOMIC STABILITY: FOOD INSECURITY: WITHIN THE PAST 12 MONTHS, THE FOOD YOU BOUGHT JUST DIDN'T LAST AND YOU DIDN'T HAVE MONEY TO GET MORE.: NEVER TRUE

## 2025-02-12 SDOH — SOCIAL STABILITY: SOCIAL INSECURITY: DO YOU FEEL UNSAFE GOING BACK TO THE PLACE WHERE YOU ARE LIVING?: NO

## 2025-02-12 SDOH — ECONOMIC STABILITY: INCOME INSECURITY: IN THE PAST 12 MONTHS HAS THE ELECTRIC, GAS, OIL, OR WATER COMPANY THREATENED TO SHUT OFF SERVICES IN YOUR HOME?: NO

## 2025-02-12 SDOH — SOCIAL STABILITY: SOCIAL INSECURITY: HAVE YOU HAD THOUGHTS OF HARMING ANYONE ELSE?: NO

## 2025-02-12 SDOH — SOCIAL STABILITY: SOCIAL INSECURITY
WITHIN THE LAST YEAR, HAVE YOU BEEN KICKED, HIT, SLAPPED, OR OTHERWISE PHYSICALLY HURT BY YOUR PARTNER OR EX-PARTNER?: NO

## 2025-02-12 SDOH — ECONOMIC STABILITY: FOOD INSECURITY: WITHIN THE PAST 12 MONTHS, YOU WORRIED THAT YOUR FOOD WOULD RUN OUT BEFORE YOU GOT THE MONEY TO BUY MORE.: NEVER TRUE

## 2025-02-12 SDOH — SOCIAL STABILITY: SOCIAL INSECURITY: HAS ANYONE EVER THREATENED TO HURT YOUR FAMILY OR YOUR PETS?: NO

## 2025-02-12 SDOH — SOCIAL STABILITY: SOCIAL INSECURITY: DO YOU FEEL ANYONE HAS EXPLOITED OR TAKEN ADVANTAGE OF YOU FINANCIALLY OR OF YOUR PERSONAL PROPERTY?: NO

## 2025-02-12 SDOH — SOCIAL STABILITY: SOCIAL INSECURITY: WERE YOU ABLE TO COMPLETE ALL THE BEHAVIORAL HEALTH SCREENINGS?: YES

## 2025-02-12 SDOH — SOCIAL STABILITY: SOCIAL INSECURITY: DOES ANYONE TRY TO KEEP YOU FROM HAVING/CONTACTING OTHER FRIENDS OR DOING THINGS OUTSIDE YOUR HOME?: NO

## 2025-02-12 ASSESSMENT — PAIN DESCRIPTION - ORIENTATION
ORIENTATION: RIGHT
ORIENTATION: RIGHT

## 2025-02-12 ASSESSMENT — COGNITIVE AND FUNCTIONAL STATUS - GENERAL
DAILY ACTIVITIY SCORE: 22
PATIENT BASELINE BEDBOUND: NO
MOBILITY SCORE: 22
TOILETING: A LITTLE
DRESSING REGULAR LOWER BODY CLOTHING: A LITTLE
CLIMB 3 TO 5 STEPS WITH RAILING: A LITTLE
WALKING IN HOSPITAL ROOM: A LITTLE

## 2025-02-12 ASSESSMENT — PAIN SCALES - GENERAL
PAINLEVEL_OUTOF10: 1
PAIN_LEVEL: 0
PAINLEVEL_OUTOF10: 0 - NO PAIN
PAINLEVEL_OUTOF10: 1
PAINLEVEL_OUTOF10: 0 - NO PAIN

## 2025-02-12 ASSESSMENT — ENCOUNTER SYMPTOMS
NAUSEA: 0
WOUND: 0
DIARRHEA: 0
CHEST TIGHTNESS: 0
SHORTNESS OF BREATH: 0
HEMATURIA: 0
TREMORS: 0
WEAKNESS: 0
JOINT SWELLING: 0
CONSTIPATION: 0
PALPITATIONS: 0
FATIGUE: 0
CHILLS: 0
BACK PAIN: 0
ABDOMINAL PAIN: 0
WHEEZING: 0
VOMITING: 0
COUGH: 0
FLANK PAIN: 0
HEADACHES: 0
FEVER: 0

## 2025-02-12 ASSESSMENT — COLUMBIA-SUICIDE SEVERITY RATING SCALE - C-SSRS
2. HAVE YOU ACTUALLY HAD ANY THOUGHTS OF KILLING YOURSELF?: NO
1. IN THE PAST MONTH, HAVE YOU WISHED YOU WERE DEAD OR WISHED YOU COULD GO TO SLEEP AND NOT WAKE UP?: NO
6. HAVE YOU EVER DONE ANYTHING, STARTED TO DO ANYTHING, OR PREPARED TO DO ANYTHING TO END YOUR LIFE?: NO

## 2025-02-12 ASSESSMENT — ACTIVITIES OF DAILY LIVING (ADL)
WALKS IN HOME: INDEPENDENT
BATHING: INDEPENDENT
GROOMING: INDEPENDENT
HEARING - RIGHT EAR: FUNCTIONAL
LACK_OF_TRANSPORTATION: NO
TOILETING: INDEPENDENT
ADEQUATE_TO_COMPLETE_ADL: YES
LACK_OF_TRANSPORTATION: NO
HEARING - LEFT EAR: FUNCTIONAL
FEEDING YOURSELF: INDEPENDENT
JUDGMENT_ADEQUATE_SAFELY_COMPLETE_DAILY_ACTIVITIES: YES
PATIENT'S MEMORY ADEQUATE TO SAFELY COMPLETE DAILY ACTIVITIES?: YES
DRESSING YOURSELF: INDEPENDENT

## 2025-02-12 ASSESSMENT — PAIN - FUNCTIONAL ASSESSMENT
PAIN_FUNCTIONAL_ASSESSMENT: 0-10
PAIN_FUNCTIONAL_ASSESSMENT: 0-10

## 2025-02-12 ASSESSMENT — LIFESTYLE VARIABLES
SKIP TO QUESTIONS 9-10: 1
HOW MANY STANDARD DRINKS CONTAINING ALCOHOL DO YOU HAVE ON A TYPICAL DAY: PATIENT DOES NOT DRINK
HOW OFTEN DO YOU HAVE 6 OR MORE DRINKS ON ONE OCCASION: NEVER
AUDIT-C TOTAL SCORE: 0
AUDIT-C TOTAL SCORE: 0
HOW OFTEN DO YOU HAVE A DRINK CONTAINING ALCOHOL: NEVER

## 2025-02-12 ASSESSMENT — PAIN DESCRIPTION - LOCATION
LOCATION: KNEE
LOCATION: KNEE

## 2025-02-12 NOTE — PROGRESS NOTES
"Physical Therapy                 Therapy Communication Note    Patient Name: Kayleen Rincon \"Reji"  MRN: [id number]  Department: POR PACU  Room: [facility] OR  Today's Date: 2/12/2025     Discipline: Physical Therapy    PT Missed Visit: Yes     Missed Visit Reason: Missed Visit Reason: Patient placed on medical hold (SPOKE TO RN PRIOR TO EVAL, SHE STATES PT. IS CHERY ADM TO SDU W/ CARDIAC CONCERNS POST SURG, WILL HOLD THERAPYTODAY, SEE FOR EVAL WHEN MEDICALLY STABLE)    Missed Time: Attempt    Comment:  "

## 2025-02-12 NOTE — H&P
"[facility] - GENERAL MEDICINE HISTORY AND PHYSICAL    History Obtained From (Primary Source): Patient  Collateral History (Secondary Sources): D/w anesthesiologist, chart review    History Of Present Illness (HPI):  Kayleen Rincon \"Reji" is a 74 y.o. female with PMHx s/f HLD, PAD s/p stent in LLE, hypothyroidism, JOANNE presenting with arrhythmia. She had primary osteoarthritis of right knee status post right total knee arthroplasty with Dr. Liu today.  There was no complication and patient tolerated the procedure well, see op note for full details.  She was discharged to PACU, found to be bradycardic with arrhythmia. She is asymptomatic. Denies history of similar episodes.  Denies lightheadedness, dizziness, nausea vomiting, chest pain, palpitations, shortness of breath.  Denies history of alcohol, smoking.    Summary - please note all labs, imaging studies, and interventions noted below have been personally reviewed and/or interpreted on day of admission:   Vitals on presentation: 97.2 F, 59 bpm, 17 RR, 112/62, 99% on 3 L nasal cannula  Labs: CMP-glucose 169, sodium 135  Troponin 3  CBC-WBC 12.1, neutrophils abs 11.39  EK: Sinus bradycardia at 47 bpm, no acute ST elevation depression  1526: Sinus rhythm at 63 bpm, prolonged TX.  .  QTc 492  Imaging: XR right knee-No immediate complication after right total knee arthroplasty   Interventions: None, admission for further management    12-point ROS reviewed and found to be negative aside from aforementioned positives in HPI and/or noted in dedicated ROS section below.     ED Course (From ED Provider):  Diagnoses as of 25 1619   Arrhythmia     Relevant Results  Results for orders placed or performed during the hospital encounter of 25 (from the past 24 hours)   EKG 12 lead   Result Value Ref Range    Ventricular Rate 47 BPM    Atrial Rate 0 BPM    TX Interval 200 ms    QRS Duration 107 ms    QT Interval 477 ms    QTC " Calculation(Bazett) 422 ms    P Axis 25 degrees    R Axis -9 degrees    T Axis -16 degrees    QRS Count 8 beats    Q Onset 252 ms    T Offset 490 ms    QTC Fredericia 439 ms   CBC and Auto Differential   Result Value Ref Range    WBC 12.1 (H) 4.4 - 11.3 x10*3/uL    nRBC 0.0 0.0 - 0.0 /100 WBCs    RBC 4.24 4.00 - 5.20 x10*6/uL    Hemoglobin 13.0 12.0 - 16.0 g/dL    Hematocrit 39.4 36.0 - 46.0 %    MCV 93 80 - 100 fL    MCH 30.7 26.0 - 34.0 pg    MCHC 33.0 32.0 - 36.0 g/dL    RDW 12.4 11.5 - 14.5 %    Platelets 255 150 - 450 x10*3/uL    Neutrophils % 94.2 40.0 - 80.0 %    Immature Granulocytes %, Automated 0.6 0.0 - 0.9 %    Lymphocytes % 4.0 13.0 - 44.0 %    Monocytes % 0.9 2.0 - 10.0 %    Eosinophils % 0.1 0.0 - 6.0 %    Basophils % 0.2 0.0 - 2.0 %    Neutrophils Absolute 11.39 (H) 1.60 - 5.50 x10*3/uL    Immature Granulocytes Absolute, Automated 0.07 0.00 - 0.50 x10*3/uL    Lymphocytes Absolute 0.48 (L) 0.80 - 3.00 x10*3/uL    Monocytes Absolute 0.11 0.05 - 0.80 x10*3/uL    Eosinophils Absolute 0.01 0.00 - 0.40 x10*3/uL    Basophils Absolute 0.03 0.00 - 0.10 x10*3/uL   Comprehensive Metabolic Panel   Result Value Ref Range    Glucose 169 (H) 74 - 99 mg/dL    Sodium 135 (L) 136 - 145 mmol/L    Potassium 4.3 3.5 - 5.3 mmol/L    Chloride 101 98 - 107 mmol/L    Bicarbonate 27 21 - 32 mmol/L    Anion Gap 11 10 - 20 mmol/L    Urea Nitrogen 16 6 - 23 mg/dL    Creatinine 0.71 0.50 - 1.05 mg/dL    eGFR 88 >60 mL/min/1.73m*2    Calcium 8.9 8.6 - 10.3 mg/dL    Albumin 4.0 3.4 - 5.0 g/dL    Alkaline Phosphatase 85 33 - 136 U/L    Total Protein 6.6 6.4 - 8.2 g/dL    AST 14 9 - 39 U/L    Bilirubin, Total 0.4 0.0 - 1.2 mg/dL    ALT 16 7 - 45 U/L   Troponin I, High Sensitivity, Initial   Result Value Ref Range    Troponin I, High Sensitivity 3 0 - 13 ng/L      EKG 12 lead    Result Date: 2/12/2025  Sinus bradycardia Atrial premature complexes in couplets Borderline T abnormalities, inferior leads    XR knee right 1-2  views    Result Date: 2/12/2025  Interpreted By:  Everton Lee, STUDY: XR KNEE RIGHT 1-2 VIEWS; ;  2/12/2025 1:50 pm   INDICATION: Signs/Symptoms:Post op knee.     COMPARISON: None.   ACCESSION NUMBER(S): [id number]   ORDERING CLINICIAN: AUGUSTA CALVILLO   FINDINGS: Right knee, two views   Total knee arthroplasty in place. There is no periprosthetic fracture or lucency. There is no dislocation. There is a small effusion. Postsurgical changes in the soft tissues       No immediate complication after right total knee arthroplasty     MACRO: None   Signed by: Everton Lee 2/12/2025 1:57 PM Dictation workstation:   [id number]    Scheduled medications:  acetaminophen, 650 mg, oral, q6h WAYLON  aspirin, 81 mg, oral, BID  ceFAZolin, 2 g, intravenous, Once  ceFAZolin, 2 g, intravenous, q8h  ketorolac, 15 mg, intravenous, q6h  lidocaine, 0.1 mL, subcutaneous, Once  [START ON 2/13/2025] pantoprazole, 40 mg, oral, Daily before breakfast  sennosides, 2 tablet, oral, BID      Continuous medications:  lactated Ringer's, 100 mL/hr  lactated Ringer's, 100 mL/hr  oxygen, 2 L/min      PRN medications:  PRN medications: albuterol, benzocaine-menthol, bisacodyl, cyclobenzaprine, diphenhydrAMINE, droperidol, hydrALAZINE, HYDROmorphone, labetaloL, meperidine, metoclopramide **OR** metoclopramide, morphine, naloxone, ondansetron, ondansetron ODT **OR** ondansetron, oxyCODONE, oxyCODONE, oxyCODONE-acetaminophen     Past Medical History  She has a past medical history of Allergic (1990), Arthritis, Breast cancer (Multi) (2001), Chronic headaches (1994), Delayed emergence from general anesthesia (1990), Easy bruising (2015), GERD (gastroesophageal reflux disease), Hernia, internal (201), Hiatal hernia (surgery in 2005?), HL (hearing loss), Joint pain, Obesity (2003), Ovarian cancer (Multi) (1978), Personal history of malignant neoplasm of breast, Personal history of other endocrine, nutritional and metabolic disease, Symptomatic  varicose veins, right, Wears dentures, and Wears partial dentures.    Surgical History  She has a past surgical history that includes Other surgical history (09/16/2019); Other surgical history (09/16/2019); Other surgical history (09/16/2019); Other surgical history (09/16/2019); Other surgical history (09/16/2019); Other surgical history (11/15/2019); Cataract extraction (Bilateral, 2023); Colonoscopy (2023); Sinus surgery (1994); Mastectomy (2001); Hernia repair (2015); Appendectomy (2002); Cholecystectomy (2002); Hysterectomy (1976); Tonsillectomy (1994); and Flexible sigmoidoscopy (2023).     Social History  She reports that she has never smoked. She has never been exposed to tobacco smoke. She has never used smokeless tobacco. She reports that she does not drink alcohol and does not use drugs.    Family History  Family History   Problem Relation Name Age of Onset    Cancer Other My self         aunt       Allergies  Latex and Penicillins    Code Status  Full Code     Review of Systems   Constitutional:  Negative for chills, fatigue and fever.   Respiratory:  Negative for cough, chest tightness, shortness of breath and wheezing.    Cardiovascular:  Negative for chest pain, palpitations and leg swelling.   Gastrointestinal:  Negative for abdominal pain, constipation, diarrhea, nausea and vomiting.   Genitourinary:  Negative for flank pain and hematuria.   Musculoskeletal:  Negative for back pain and joint swelling.   Skin:  Negative for rash and wound.   Neurological:  Negative for tremors, syncope, weakness and headaches.       Last Recorded Vitals  /62   Pulse 59   Temp 36.2 °C (97.2 °F) (Temporal)   Resp 17   SpO2 99%      Physical Exam:  Vital signs and nursing notes reviewed.   Constitutional: Pleasant and cooperative. Laying in bed in no acute distress. Conversant.   Skin: Warm and dry; no obvious lesions, rashes, pallor, or jaundice.   Eyes: EOMI. Anicteric sclera.   ENT: Mucous membranes moist;  no obvious injury or deformity appreciated.   Head and Neck: Normocephalic, atraumatic. ROM preserved. Trachea midline. No appreciable JVD.   Respiratory: Nonlabored on 3L NC. Lungs clear to auscultation bilaterally without obvious adventitious sounds. Chest rise is equal.  Cardiovascular: RRR. No gross murmur, gallop, or rub. Extremities are warm and well-perfused with good capillary refill (< 3 seconds). No chest wall tenderness.   GI: Abdomen soft, nontender, nondistended. No obvious organomegaly appreciated. Bowel sounds are present.  : No CVA tenderness.   MSK: s/p right TKA with ACE dressings wrapped to right leg, did not remove. SCD in place of left leg.    Extremities: No cyanosis, edema, or clubbing evident. Neurovascularly intact.   Neuro: A&Ox3. CN 2-12 grossly intact. Able to respond to questions appropriately and clearly. No acute focal neurologic deficits appreciated.  Psych: Appropriate mood and behavior.    Assessment/Plan     74 y.o. female with PMHx s/f HLD, PAD s/p stent in LLE, hypothyroidism, JOANNE presenting with arrhythmia.     Arrhthymias, suspect second-degree AV block type II  -EKG without ischemic changes as stated under Summary  -TTE on 1/30/2025: LVEF low normal of 54%.  Normal RV global systolic function.  Mild aortic valve regurgitation.  -Follow electrolytes, replenish as necessary  -Mg pending  -lipid panel, A1c, TSH pending  -Telemetry monitoring  -Cardiology consult    Primary OA of right knee s/p right TKA on 2/12/2025  -Continue LR infusion for a day  -ASA 81mg BID x 4 weeks for DVT ppx  -continue on Ancef  -antiemetics and pain meds PRN  -PT OT eval    PAD s/p stent in LLE  -continue follow-up with vascular surgery as scheduled    Hypothyroidism  -no longer on meds  -TSH pending    JOANNE  -Hgb stable  -continue home ferrous sulfate    Diet: Regular, ADAT  DVT Prophylaxis: Aspirin, SCDs  Code Status: Full code  Case Discussed With: ED provider  Additional Sources Reviewed: PCP,  anesthesiology, orthopedic surgery    Anticipated Length of Stay (LOS): Patient will require 2+ midnights     Ish Do PA-C    Dragon dictation software was used to dictate this note and thus there may be minor errors in translation/transcription including garbled speech or misspellings. Please contact for clarification if needed.

## 2025-02-12 NOTE — ANESTHESIA PROCEDURE NOTES
Spinal Block    Patient location during procedure: OR  Start time: 2/12/2025 10:42 AM  End time: 2/12/2025 10:57 AM  Reason for block: primary anesthetic and at surgeon's request  Staffing  Performed: CRNA and SRNA   Authorized by: DULCE Goss    Performed by: JENNIFER Goss-CRNA    Preanesthetic Checklist  Completed: patient identified, IV checked, site marked, risks and benefits discussed, surgical consent, monitors and equipment checked, pre-op evaluation, timeout performed and sterile techniques followed  Block Timeout  RN/Licensed healthcare professional reads aloud to the Anesthesia provider and entire team: Patient identity, procedure with side and site, patient position, and as applicable the availability of implants/special equipment/special requirements.  Patient on coagulant treatment: yes  Timeout performed at: 2/12/2025 10:42 AM  Spinal Block  Patient position: sitting  Prep: Betadine  Sterility prep: cap, drape, gloves, gown, hand hygiene and mask  Sedation level: light sedation  Patient monitoring: blood pressure, continuous pulse oximetry, EKG, heart rate and ETCO2  Approach: midline  Vertebral space: L4-5  Injection technique: single-shot  Needle  Needle type: pencil-point   Needle gauge: 22 G  Needle length: 3.5 in  Free flowing CSF: yes    Assessment  Sensory level: T6 bilateral  Block outcome: patient comfortable  Procedure assessment: patient tolerated procedure well with no immediate complications  Additional Notes  Prep and drape using sterile technique. Localized. Introducer inserted followed by spinal needle. Unable to obtain spinal at L3-4 due to periosteum and heme per SRNA. Successful spinal insertion at L4-5 per CRNA. + free flowing CSF, -Heme, -paresthesia. Bupivacaine inserted slowly with +swirl noted at beginning, midway, and end of injection. Pt positioned on affected side for 3 minutes then positioned for surgery via surgeon. Pt tolerated procedure well

## 2025-02-12 NOTE — ANESTHESIA POSTPROCEDURE EVALUATION
"Patient: Kayleen Rincon \"Becky\"    Procedure Summary       Date: 02/12/25 Room / Location: [facility] OR 06 / Virtual [facility] OR    Anesthesia Start: 1039 Anesthesia Stop: 1240    Procedure: Right Total Knee Arthroplasty *Rapid Recovery*,( [place]), Spinal/Block (Right: Knee) Diagnosis:       Primary osteoarthritis of right knee      (Primary osteoarthritis of right knee [M17.11])    Surgeons: Lazara Liu DO Responsible Provider: JENNIFER Goss-EL    Anesthesia Type: general, regional, spinal ASA Status: 3            Anesthesia Type: general, regional, spinal    Vitals Value Taken Time   /62 02/12/25 1545   Temp 36.2 °C (97.2 °F) 02/12/25 1530   Pulse 59 02/12/25 1545   Resp 17 02/12/25 1545   SpO2 99 % 02/12/25 1545       Anesthesia Post Evaluation    Patient location during evaluation: PACU  Patient participation: complete - patient participated  Level of consciousness: awake and alert  Pain score: 0  Pain management: adequate  Multimodal analgesia pain management approach  Airway patency: patent  Cardiovascular status: bradycardic and acceptable  Respiratory status: nasal cannula  Hydration status: acceptable  Postoperative Nausea and Vomiting: none  Comments: PT BEING ADMITTED TO SDU.  GOING IN AND OUT OF WHAT APPEARS TO BE A SECOND DEGREE BLOCK.  CARDIOLOGY TO BE CONSULTED BY MEDICINE.    There were no known notable events for this encounter.    "

## 2025-02-12 NOTE — OP NOTE
Date: 2025   OR Location: [facility] OR    Name: Kayleen Rincon  : 1950    MRN: [id number]    Diagnosis  Pre-op Diagnosis      * Primary osteoarthritis of right knee [M17.11]  Post-op Diagnosis     * Primary osteoarthritis of right knee [M17.11]      Procedures  Right Total Knee Arthroplasty *Rapid Recovery*,( [place]), Spinal/Block  61507 - [place] ARTHRP KNE CONDYLE&PLATU MEDIAL&LAT COMPARTMENTS  A 22 modifier is being added to this case for increased complexity secondary to patients BMI >35 ( 40  ).  This led to increased surgical time, need for additional help in the operating room, and additional retractor use as compared to a standard arthroplasty procedure.      Surgeons      * Lazara Liu - Primary     Specimen: none    Staff: Circulator: Lenin Bernal RN  Relief Circulator: Charlotte Anand RN  Relief Scrub: Abeba Calero  Scrub Person: Vijaya Guzman     Drains and/or Catheters: none    Tourniquet Times: not used    Estimated Blood Loss: 150 cc    Resident/Fellow/Other Assistant:  Surgeons and Role:     * Rachel Pizarro PA-C - [facility] First Assist    was present for the entire case. Their assistance was necessary and critical to the successful completion of the procedure.They provided skilled assistance with leg positioning, retraction, leg manipulation, implant insertion and wound closure.  A qualified assistant was not available to perform their portion of the case.    Procedure Summary  Anesthesia: Spinal    Anesthesia Staff: CRNA: JENNIFER Goss-CRNA   ASA: III       Intra-op Medications:   - 1 Gram Tranexamic acid prior to surgical incision  - 1 Gram Tranexamic acid at start of incision close  - LAWRENCE Pain cockail: ropivacaine-epinephrine-clonidine-ketorolac 2.46-0.005- 0.0008-0.3mg/mL periarticular syringe: 50 cc    IMPLANTS:   Implant Name Type Inv. Item Serial No.  Lot No. LRB No. Used Action   PIN, HEADLESS 1/8 X 3.5 FLUTE 4/PK STERILE - [id number] Joint PIN, HEADLESS 1/8 X 3.5 FLUTE  4/PK STERILE  [employer] [id number] Right 1 Implanted   PATELLA, TRIATHLON, ASYMMETRIC, SIZE A29 - [id number] Joint PATELLA, TRIATHLON, ASYMMETRIC, SIZE A29  [employer] [id number] Right 1 Implanted   COMPONENT, CR FEMORAL, P3, BEADED W/PA, RIGHT - [id number] Joint COMPONENT, CR FEMORAL, P3, BEADED W/PA, RIGHT  [employer] [id number] Right 1 Implanted   INSERT, TIBIAL, X3 TRIATHLON CS, SZ-3 9MM - [id number] Joint INSERT, TIBIAL, X3 TRIATHLON CS, SZ-3 9MM  [employer] [id number] Right 1 Implanted   BASEPLATE, TRIATHLON TRITANIUM, SIZE 3, 44MM - [id number] Joint BASEPLATE, TRIATHLON TRITANIUM, SIZE 3, 44MM  [employer] [id number] Right 1 Implanted       Findings: See operative note    Operative Indications:  Kayleen Rincon is a patient that is well-known to me and initially presented as an outpatient. They have been treated for advanced knee osteoarthritis with therapy, anti-inflammatories, and activity modification, all without improvement of symptoms. They are here for surgery for Pre-op Diagnosis      * Primary osteoarthritis of right knee [M17.11].     We therefore reviewed the alternative option of elective total knee arthroplasty. The risks and benefits of this procedure were discussed in detail as an outpatient and are documented in our outpatient record. The patient expressed full understanding and wished to proceed. Informed consent was obtained and was placed on the medical chart    The risks, benefits and potential complications of the arthroplasty surgery were discussed with the patient in detail.  Risks including but not limited to the risk of anesthesia, DVT, pulmonary embolism with the possibility of death, retained infection, and neurovascular injury.  Specific details of the procedure, hospitalization, recovery, rehabilitation, and long-term precautions were also provided. Pre-operative teaching was provided. Implant/prosthesis selection was outlined, and the many options  available were explained; the final choice will be made at the time of the procedure to match the anatomy and condition of the bone, ligaments, tendons, and muscles. Understanding of all topics was conveyed to me by the patient, and consent was given to proceed with a total knee arthroplasty.     On the day of surgery the site of surgery was properly noted/marked if necessary per policy. The patient has been actively warmed in preoperative area. Preoperative antibiotics were confirmed and started prior to surgical incision. Venous thrombosis prophylaxis have been ordered including bilateral sequential compression devices to start in pre-operative area.     Procedure In Detail:  The patient was identified in the preoperative area .Their knee was then marked by myself and the patient agreed to proceed with the outlined procedure. They were transferred to the operating room and positioned supine on the OR table. Appropriate surgical huddle was performed with myself present. Anesthesia was then successfully induced. The operative lower extremity was then prepped and draped in the normal sterile fashion. A critical pause was taken and we identified the patient, the site of surgery, as well as the administration of preoperative IV antibiotics.     With the knee in 90-degrees of flexion an anterior midline incision was made. A standard medial parapatellar arthrotomy was then made and the knee was extended. A provisional soft tissue medial release was performed to the posterior medial corner. Accessible joint line osteophytes were excised from the tibia. The patellar fat pad was excised and the patella was everted and controlled with two towel clips. The thickness was measured with a caliper and a freehand measured resection was then performed of the articular surface. The patella was then appropriately sized. Peg holes were drilled and a trial was inserted. Restoration of pre-resection thickness was noted. A protective  plate was then applied to the patella and it was subluxed into the lateral gutter. The knee was flexed to 90-degrees and retractors were inserted. The ACL and lateral meniscus were released. Whitesides alignment was then drawn on the distal femur and the femur was entered with the opening reamer. Intramedullary femoral alignment guide was then inserted and the distal femoral cutting block was pinned in place for a 8 mm measured resection at 5-degrees of valgus . The bone cuts were made and retractors were repositioned to expose the tibia. A tibial extramedullary alignment tower was then applied parallel to the long axis and perpendicular on the sagittal plane. The cutting block was then pinned in place for a 2 mm measured resection of the medial after the cartilage was removed to help plateau create a concerted tibial cut do not over resect the medial plateau. The bone block was then excised and the knee was extended. All the retractors were removed and spacer blocks were inserted. The spacer block confirmed correction of alignment and adequate resection. Varus and valgus stresses were then applied with the spacer block in with stability of collateral ligaments noted.  No flexure contracture was identified at this point. The knee was then flexed back to 90-degrees and the distal femur was sized to a sized appropriately using the posterior referencing guide. External rotation peg holes were drilled in parallel with the tibial cut.  There is noted to be adequately externally rotated relative to the epicondylar axis.  four-in-one cutting guide was then impacted onto the femur and all the appropriate bone cuts were then made. Lamina  was inserted into the joint and the remainder of menisci as well as any posterior osteophytes were excised. Collateral ligaments were balanced. Satisfied with the gap balance the tibia was then exposed. The tibia was appropriately sized and the tibial baseplate was then placed onto  the tibia and pinned in place in the appropriate amount of external rotation. It was noted to fit well without any overhang. Femoral and polyethylene CR trials were inserted and the knee was extended. With the trials in the knee was then noted to come to full extension and flexed to greater than 120 degrees. The patella tracked centrally throughout the range of motion. Satisfied with this reconstruction, the CR trial was removed and the femoral trial lug holes were drilled for a pressfit femur. The femoral trial was then removed.The tibia was then exposed. The tibia was then broached and drilled for a pressfit baseplate. All the and the bony surfaces were lavaged with normal saline. All components were then pressfit in place.  Knee was then taken through a range of motion with noted central tracking of the pressfit patella. An anesthetic injection cocktail was infiltrated throughout the soft tissues. The arthrotomy was then repaired with a few interrupted 1-0 vicryl sutures and reinforced with a running #1 barbed Stratafix suture. Fat layer over quadricep muscle was closed. Subcutaneous tissues were closed in layers and finally with monocryl. Skin glue was applied.. Sterile dressing was applied and the patient was then transferred to the PACU in stable condition. All counts were correct at the end of the case.     Complications:  None; patient tolerated the procedure well.    Disposition: PACU - hemodynamically stable.  Condition: stable      Plan:                         Weight Bearing Status:  WBAT Bilateral LE                        Range of Motion: As tolerated                        Conrad: none placed                        Dressing: Maintain for one week, may remove at home. Maintain underlying mesh/glue dressing for three weeks                        DVT Prophylaxis: ASA 81mg BID x 4 weeks                        Antibiotics: Continue x24 hours skinny-operatively and dc home with one week doxy                         Discharge/Follow-up: Follow up in clinic in four weeks      Lazara Liu DO  Attending Surgeon  Joint Replacement and Adult Reconstructive Surgery  [place], [place]      Attending Attestation: I was present and scrubbed for the entire procedure.    This note was dictated using speech recognition software and was not corrected for spelling or grammatical errors

## 2025-02-12 NOTE — ANESTHESIA PROCEDURE NOTES
Peripheral Block    Patient location during procedure: pre-op  Start time: 2/12/2025 10:32 AM  End time: 2/12/2025 10:35 AM  Reason for block: at surgeon's request and post-op pain management  Staffing  Performed: CRNA   Authorized by: DULCE Goss    Performed by: DULCE Del Valle  Preanesthetic Checklist  Completed: patient identified, IV checked, site marked, risks and benefits discussed, surgical consent, monitors and equipment checked, pre-op evaluation and timeout performed   Timeout performed at: 2/12/2025 10:17 AM  Peripheral Block  Patient position: laying flat  Prep: ChloraPrep  Patient monitoring: heart rate, cardiac monitor and continuous pulse ox  Block type: IPACK  Laterality: right  Injection technique: single-shot  Guidance: ultrasound guided  Local infiltration: bupivicaine  Infiltration strength: 0.5 %  Dose: 20 mL  Needle  Needle gauge: 20 G  Needle localization: ultrasound guidance  Assessment  Injection assessment: local visualized surrounding nerve on ultrasound, incremental injection, no paresthesia on injection and negative aspiration for heme  Paresthesia pain: none  Heart rate change: no  Slow fractionated injection: yes  Additional Notes  Informed consent, timeout, and site confirmed with patient. Ultrasound moved distally from Adductor canal block to RIGHT medial knee. Area cleaned and IPACK nerve block done with 20G 6 inch needle without incident after previous block (see block note #1).

## 2025-02-12 NOTE — ANESTHESIA PROCEDURE NOTES
"Peripheral Block    Patient location during procedure: pre-op  Start time: 2/12/2025 10:22 AM  End time: 2/12/2025 10:31 AM  Reason for block: at surgeon's request and post-op pain management  Staffing  Performed: CRNA   Authorized by: DULCE Goss    Performed by: DULCE Del Valle  Preanesthetic Checklist  Completed: patient identified, IV checked, site marked, risks and benefits discussed, surgical consent, monitors and equipment checked, pre-op evaluation and timeout performed   Timeout performed at: 2/12/2025 10:17 AM  Peripheral Block  Patient position: laying flat  Prep: ChloraPrep  Patient monitoring: heart rate, cardiac monitor and continuous pulse ox  Block type: adductor canal  Injection technique: single-shot  Guidance: ultrasound guided  Local infiltration: ropivacaine  Infiltration strength: 0.5 %  Dose: 20 mL  Needle  Needle gauge: 20 G  Needle localization: ultrasound guidance  Assessment  Injection assessment: local visualized surrounding nerve on ultrasound, incremental injection, no paresthesia on injection and negative aspiration for heme  Paresthesia pain: none  Heart rate change: no  Slow fractionated injection: yes  Additional Notes  Anesthesia consult was placed by Dr. Liu for post procedural analgesia.  The patient's chart was reviewed and they were deemed an appropriate candidate for the procedure. The patient was educated in detail on the risks, benefits, and alternatives to the block including but not limited to: temporary or permanent nerve damage, bleeding, infection, damage to surrounding tissues, possible block failure and the potential for local anesthesia toxicity syndrome.  The patient expressed understanding and all questions were answered prior to the initiation of the procedure.  Informed consent was also signed by the patient and laterality determined per institutional policy.  A formal \"time out \"consistent with the institutions rules and regulations was " performed by the anesthesia provider and appropriate RN.     Procedure  The patient was placed in the supine position. The RIGHT side was marked and skin prep applied and allowed to dry. Proper monitors were applied with oxygen.  Sedation was provided by administering midazolam 2 mg and fentanyl 50 mcg.    A 20G 6 inch stimuplex needle was then advanced maintaining an in-plane visualization throughout the procedure, under ultrasound guidance from lateral to medial to come to rest just deep to the neurovascular sheath, but avoiding contact of the saphenous nerve.  Upon negative aspiration, 5ml of 2% lidocaine, 20 ml 0.5% Ropivicaine with 4 mg decadron preservative free was administered safely and cautiously around the nerve structure.  Aspiration every 3-5mls was done to avoid potential intravascular injection.  Probe moved distal and IPACK block completed (See note 2) All injections were done without resistance and were free of blood.  The patient tolerated the procedure well without report of intense pain, tinnitus, metallic taste, or circumoral numbness.  The block was then evaluated after a few minutes and appeared to be functioning appropriately.

## 2025-02-13 ENCOUNTER — APPOINTMENT (OUTPATIENT)
Dept: CARDIOLOGY | Facility: HOSPITAL | Age: 75
End: 2025-02-13
Payer: MEDICARE

## 2025-02-13 ENCOUNTER — PHARMACY VISIT (OUTPATIENT)
Dept: PHARMACY | Facility: CLINIC | Age: 75
End: 2025-02-13
Payer: MEDICARE

## 2025-02-13 VITALS
DIASTOLIC BLOOD PRESSURE: 66 MMHG | OXYGEN SATURATION: 94 % | HEIGHT: 62 IN | HEART RATE: 59 BPM | SYSTOLIC BLOOD PRESSURE: 107 MMHG | TEMPERATURE: 96.6 F | RESPIRATION RATE: 18 BRPM | BODY MASS INDEX: 41.38 KG/M2 | WEIGHT: 224.87 LBS

## 2025-02-13 LAB
ATRIAL RATE: 0 BPM
ATRIAL RATE: 78 BPM
BODY SURFACE AREA: 2.11 M2
CARDIAC TROPONIN I PNL SERPL HS: 3 NG/L (ref 0–13)
CHOLEST SERPL-MCNC: 157 MG/DL (ref 0–199)
CHOLESTEROL/HDL RATIO: 3.5
EST. AVERAGE GLUCOSE BLD GHB EST-MCNC: 94 MG/DL
HBA1C MFR BLD: 4.9 %
HDLC SERPL-MCNC: 45.5 MG/DL
LDLC SERPL CALC-MCNC: 85 MG/DL
NON HDL CHOLESTEROL: 112 MG/DL (ref 0–149)
P AXIS: 25 DEGREES
P AXIS: 45 DEGREES
P OFFSET: 154 MS
P ONSET: 107 MS
PR INTERVAL: 200 MS
PR INTERVAL: 224 MS
Q ONSET: 219 MS
Q ONSET: 252 MS
QRS COUNT: 13 BEATS
QRS COUNT: 8 BEATS
QRS DURATION: 107 MS
QRS DURATION: 86 MS
QT INTERVAL: 400 MS
QT INTERVAL: 477 MS
QTC CALCULATION(BAZETT): 422 MS
QTC CALCULATION(BAZETT): 456 MS
QTC FREDERICIA: 436 MS
QTC FREDERICIA: 439 MS
R AXIS: -10 DEGREES
R AXIS: -9 DEGREES
T AXIS: -16 DEGREES
T AXIS: 18 DEGREES
T OFFSET: 419 MS
T OFFSET: 490 MS
TRIGL SERPL-MCNC: 133 MG/DL (ref 0–149)
TSH SERPL-ACNC: 0.5 MIU/L (ref 0.44–3.98)
VENTRICULAR RATE: 47 BPM
VENTRICULAR RATE: 78 BPM
VLDL: 27 MG/DL (ref 0–40)

## 2025-02-13 PROCEDURE — 2500000001 HC RX 250 WO HCPCS SELF ADMINISTERED DRUGS (ALT 637 FOR MEDICARE OP)

## 2025-02-13 PROCEDURE — 2500000001 HC RX 250 WO HCPCS SELF ADMINISTERED DRUGS (ALT 637 FOR MEDICARE OP): Performed by: REGISTERED NURSE

## 2025-02-13 PROCEDURE — 93246 EXT ECG>7D<15D RECORDING: CPT

## 2025-02-13 PROCEDURE — 97530 THERAPEUTIC ACTIVITIES: CPT | Mod: GP,CQ | Performed by: PHYSICAL THERAPY ASSISTANT

## 2025-02-13 PROCEDURE — 97165 OT EVAL LOW COMPLEX 30 MIN: CPT | Mod: GO

## 2025-02-13 PROCEDURE — 97116 GAIT TRAINING THERAPY: CPT | Mod: GP,CQ | Performed by: PHYSICAL THERAPY ASSISTANT

## 2025-02-13 PROCEDURE — 99222 1ST HOSP IP/OBS MODERATE 55: CPT | Performed by: INTERNAL MEDICINE

## 2025-02-13 PROCEDURE — 2500000004 HC RX 250 GENERAL PHARMACY W/ HCPCS (ALT 636 FOR OP/ED)

## 2025-02-13 PROCEDURE — 99239 HOSP IP/OBS DSCHRG MGMT >30: CPT | Performed by: INTERNAL MEDICINE

## 2025-02-13 PROCEDURE — 83036 HEMOGLOBIN GLYCOSYLATED A1C: CPT | Mod: PORLAB

## 2025-02-13 PROCEDURE — 96376 TX/PRO/DX INJ SAME DRUG ADON: CPT

## 2025-02-13 PROCEDURE — 36415 COLL VENOUS BLD VENIPUNCTURE: CPT

## 2025-02-13 PROCEDURE — 84443 ASSAY THYROID STIM HORMONE: CPT

## 2025-02-13 PROCEDURE — G0378 HOSPITAL OBSERVATION PER HR: HCPCS

## 2025-02-13 PROCEDURE — 80061 LIPID PANEL: CPT

## 2025-02-13 PROCEDURE — 96361 HYDRATE IV INFUSION ADD-ON: CPT

## 2025-02-13 PROCEDURE — 97110 THERAPEUTIC EXERCISES: CPT | Mod: GP

## 2025-02-13 PROCEDURE — 97162 PT EVAL MOD COMPLEX 30 MIN: CPT | Mod: GP

## 2025-02-13 PROCEDURE — 84484 ASSAY OF TROPONIN QUANT: CPT

## 2025-02-13 RX ADMIN — ACETAMINOPHEN 650 MG: 325 TABLET ORAL at 12:14

## 2025-02-13 RX ADMIN — PANTOPRAZOLE SODIUM 40 MG: 40 TABLET, DELAYED RELEASE ORAL at 05:39

## 2025-02-13 RX ADMIN — ACETAMINOPHEN 650 MG: 325 TABLET ORAL at 05:39

## 2025-02-13 RX ADMIN — KETOROLAC TROMETHAMINE 15 MG: 30 INJECTION, SOLUTION INTRAMUSCULAR at 04:24

## 2025-02-13 RX ADMIN — OXYCODONE HYDROCHLORIDE 5 MG: 5 TABLET ORAL at 14:17

## 2025-02-13 RX ADMIN — Medication 5 MG: at 00:01

## 2025-02-13 RX ADMIN — KETOROLAC TROMETHAMINE 15 MG: 30 INJECTION, SOLUTION INTRAMUSCULAR at 09:34

## 2025-02-13 RX ADMIN — FERROUS SULFATE TAB 325 MG (65 MG ELEMENTAL FE) 325 MG: 325 (65 FE) TAB at 09:34

## 2025-02-13 RX ADMIN — SODIUM CHLORIDE, POTASSIUM CHLORIDE, SODIUM LACTATE AND CALCIUM CHLORIDE 100 ML/HR: 600; 310; 30; 20 INJECTION, SOLUTION INTRAVENOUS at 04:24

## 2025-02-13 RX ADMIN — ASPIRIN 81 MG: 81 TABLET, COATED ORAL at 09:34

## 2025-02-13 ASSESSMENT — PAIN DESCRIPTION - ORIENTATION
ORIENTATION: RIGHT

## 2025-02-13 ASSESSMENT — COGNITIVE AND FUNCTIONAL STATUS - GENERAL
MOVING TO AND FROM BED TO CHAIR: A LITTLE
HELP NEEDED FOR BATHING: A LITTLE
MOVING FROM LYING ON BACK TO SITTING ON SIDE OF FLAT BED WITH BEDRAILS: A LITTLE
MOBILITY SCORE: 18
MOBILITY SCORE: 16
DAILY ACTIVITIY SCORE: 21
CLIMB 3 TO 5 STEPS WITH RAILING: A LOT
DRESSING REGULAR LOWER BODY CLOTHING: A LITTLE
WALKING IN HOSPITAL ROOM: A LOT
TOILETING: A LITTLE
WALKING IN HOSPITAL ROOM: A LITTLE
STANDING UP FROM CHAIR USING ARMS: A LITTLE
STANDING UP FROM CHAIR USING ARMS: A LITTLE
MOVING TO AND FROM BED TO CHAIR: A LITTLE
TURNING FROM BACK TO SIDE WHILE IN FLAT BAD: A LITTLE
CLIMB 3 TO 5 STEPS WITH RAILING: A LITTLE
MOVING FROM LYING ON BACK TO SITTING ON SIDE OF FLAT BED WITH BEDRAILS: A LITTLE
TURNING FROM BACK TO SIDE WHILE IN FLAT BAD: A LITTLE

## 2025-02-13 ASSESSMENT — PAIN - FUNCTIONAL ASSESSMENT
PAIN_FUNCTIONAL_ASSESSMENT: 0-10

## 2025-02-13 ASSESSMENT — PAIN DESCRIPTION - LOCATION
LOCATION: KNEE

## 2025-02-13 ASSESSMENT — PAIN SCALES - GENERAL
PAINLEVEL_OUTOF10: 2
PAINLEVEL_OUTOF10: 3
PAINLEVEL_OUTOF10: 6
PAINLEVEL_OUTOF10: 2
PAINLEVEL_OUTOF10: 2

## 2025-02-13 ASSESSMENT — ACTIVITIES OF DAILY LIVING (ADL)
LACK_OF_TRANSPORTATION: NO
ADL_ASSISTANCE: INDEPENDENT
BATHING_ASSISTANCE: MINIMAL

## 2025-02-13 NOTE — CONSULTS
"[facility] Heart and Vascular Cardiology    Patient Name: Kayleen Rincon  Patient : 1950  Room/Bed: -A    Date: 25  Time: 9:19 AM    Referred by Dr. Morris ref. provider found for No chief complaint on file.     History Of Present Illness:    Kayleen Rincon \"Reji" is a 74 y.o. female who was admitted after undergoing right total knee arthroplasty with Dr. Liu yesterday.  Postprocedure patient was noted to be bradycardic with a second-degree AV block.  She otherwise denied any new cardiac problems or complaints such as chest pain, palpitations, lightheadedness, or abnormal shortness of breath.  Telemetry currently showing sinus rhythm with heart rate in the 60s.  During my exam patient was resting comfortably in bed.  BMP shows a serum sodium 135, serum potassium of 4.3, serum creatinine of 0.71.  CBC showed hemoglobin of 13.0.  Lipid panel showed an LDL cholesterol of 85 and triglycerides of 133.  Troponin was 3 x 2.  TSH was 0.50.  CBC showed hemoglobin of 13.0.  Initial ECG showed sinus bradycardia with a dropped beat suggesting second-degree AV block.  Subsequent ECG showing sinus rhythm with what appears to be a second-degree Mobitz 1 AV block and a heart rate of 78 bpm.  Echocardiogram done in 2025 showed low normal left ventricular systolic function with an ejection fraction of 54%, normal right ventricular systolic function, and mild aortic valve regurgitation.     Assessment/Plan:   1.  Second-degree AV block  Patient noted to have second-degree AV block post surgery.  Initial ECG showed sinus bradycardia with a dropped beat suggesting second-degree AV block.  Subsequent ECG showing sinus rhythm with what appears to be a second-degree Mobitz 1 AV block and a heart rate of 78 bpm.    Echocardiogram done in 2025 showed low normal left ventricular systolic function with an ejection fraction of 54%, normal right ventricular systolic function, and mild aortic valve " regurgitation.   Telemetry currently showing sinus rhythm with heart rate in the 60s.  Blood pressure and heart rate are stable this morning.  Avoid AV anthony blocking agents.  Would recommend outpatient Holter monitoring.  Patient can otherwise be discharged from the cardiac perspective.    2.  Osteoarthritis status post right TKA  Patient is status post right total knee replacement with orthopedics yesterday.  She reports feeling well today and is hoping for discharge home.    3.  PAD with prior intervention  Continue with risk factor modification.    4.  Aortic valve regurgitation  Echocardiogram done in January 2025 showed low normal left ventricular systolic function with an ejection fraction of 54%, normal right ventricular systolic function, and mild aortic valve regurgitation.  Continue to monitor clinically for now.    Past Medical History:  She has a past medical history of Allergic (1990), Arthritis, Breast cancer (Multi) (2001), Chronic headaches (1994), Delayed emergence from general anesthesia (1990), Easy bruising (2015), GERD (gastroesophageal reflux disease), Hernia, internal (201), Hiatal hernia (surgery in 2005?), HL (hearing loss), Joint pain, Obesity (2003), Ovarian cancer (Multi) (1978), Personal history of malignant neoplasm of breast, Personal history of other endocrine, nutritional and metabolic disease, Symptomatic varicose veins, right, Wears dentures, and Wears partial dentures.    Past Surgical History:  She has a past surgical history that includes Other surgical history (09/16/2019); Other surgical history (09/16/2019); Other surgical history (09/16/2019); Other surgical history (09/16/2019); Other surgical history (09/16/2019); Other surgical history (11/15/2019); Cataract extraction (Bilateral, 2023); Colonoscopy (2023); Sinus surgery (1994); Mastectomy (2001); Hernia repair (2015); Appendectomy (2002); Cholecystectomy (2002); Hysterectomy (1976); Tonsillectomy (1994); and Flexible  sigmoidoscopy (2023).      Social History:  She reports that she has never smoked. She has never been exposed to tobacco smoke. She has never used smokeless tobacco. She reports that she does not drink alcohol and does not use drugs.    Family History:  Family History   Problem Relation Name Age of Onset    Cancer Other My self         aunt        Allergies:  Latex and Penicillins    Outpatient Medications:  Current Outpatient Medications   Medication Instructions    acetaminophen (TYLENOL) 975 mg, oral, Every 8 hours PRN    aspirin 81 mg, oral, Daily    aspirin 81 mg, oral, 2 times daily, To prevent blood clots    celecoxib (CELEBREX) 200 mg, oral, 2 times daily, For pain, inflammation, and swelling    chlorhexidine (Peridex) 0.12 % solution Swish and Spit 15 ml the night before and the morning of surgery. (2 Doses)    cyclobenzaprine (FLEXERIL) 5 mg, oral, 3 times daily PRN    doxycycline (MONODOX) 100 mg, oral, 2 times daily, To prevent infection    ergocalciferol (VITAMIN D-2) 1,250 mcg, oral, Once Weekly    ferrous sulfate, 325 mg ferrous sulfate, (iron) tablet 1 tablet, oral, Once daily (morning) M-F (5 days a week)    omeprazole OTC (PriLOSEC OTC) 20 mg EC tablet Take 1 tablet (20 mg) by mouth twice a day for 14 days, then take daily for 30 days more.    oxyCODONE (ROXICODONE) 5 mg, oral, Every 6 hours PRN    pantoprazole (PROTONIX) 40 mg, oral, Daily PRN, Do not crush, chew, or split.    sennosides (SENOKOT) 8.6 mg, oral, 2 times daily, To prevent constipation    traMADol (ULTRAM) 50 mg, oral, Every 6 hours PRN        ROS:  A 14 point review of systems was done and is negative other than as stated in HPI    Vitals:  Vitals:    02/13/25 0437 02/13/25 0539 02/13/25 0544 02/13/25 0828   BP: 122/70   112/58   BP Location: Left arm   Left arm   Patient Position: Lying   Lying   Pulse: 58   57   Resp: 18   16   Temp: 36.7 °C (98 °F)   36.4 °C (97.6 °F)   TempSrc: Temporal   Temporal   SpO2: 96% 98% 96% 94%    Weight: 102 kg (224 lb 13.9 oz)      Height:           Physical Exam:     Constitutional: Cooperative, in no acute distress, alert, appears stated age.  Skin: Skin color, texture, turgor normal. No rashes or lesions.  Head: Normocephalic. No masses, lesions, tenderness or abnormalities  Eyes: Extraocular movements are grossly intact.  Mouth and throat: Mucous membranes moist  Neck: Neck supple, no carotid bruits, no JVD  Respiratory: Lungs clear to auscultation, no wheezing or rhonchi, no use of accessory muscles  Chest wall: No scars, normal excursion with respiration  Cardiovascular: Regular rhythm without murmur  Gastrointestinal: Abdomen soft, nontender. Bowel sounds normal.  Musculoskeletal: Strength equal in upper extremities  Extremities: Right leg bandaged, trace lower extremity edema  Neurologic: Sensation grossly intact, alert and oriented ×3    Intake/Output:   I/O last 2 completed shifts:  In: 3561.3 (34.9 mL/kg) [P.O.:1533; I.V.:728.3 (7.1 mL/kg); IV Piggyback:1300]  Out: 2350 (23 mL/kg) [Urine:2200 (0.9 mL/kg/hr); Blood:150]  Weight: 102 kg     Outpatient Medications  No current facility-administered medications on file prior to encounter.     Current Outpatient Medications on File Prior to Encounter   Medication Sig Dispense Refill    ferrous sulfate, 325 mg ferrous sulfate, (iron) tablet Take 1 tablet by mouth once daily (M-F). 90 tablet 1    omeprazole OTC (PriLOSEC OTC) 20 mg EC tablet Take 1 tablet (20 mg) by mouth twice a day for 14 days, then take daily for 30 days more. 58 tablet 0       Scheduled medications  acetaminophen, 650 mg, oral, q6h WAYLON  aspirin, 81 mg, oral, BID  ceFAZolin, 2 g, intravenous, Once  ferrous sulfate (325 mg ferrous sulfate), 65 mg of iron, oral, Daily  ketorolac, 15 mg, intravenous, q6h  pantoprazole, 40 mg, oral, Daily before breakfast  sennosides, 2 tablet, oral, BID      Continuous medications  lactated Ringer's, 100 mL/hr, Last Rate: 100 mL/hr (02/13/25  0822)  oxygen, 2 L/min      PRN medications  PRN medications: benzocaine-menthol, bisacodyl, cyclobenzaprine, melatonin, metoclopramide **OR** metoclopramide, naloxone, ondansetron ODT **OR** ondansetron, oxyCODONE, oxyCODONE   Medications Prior to Admission   Medication Sig Dispense Refill Last Dose/Taking    aspirin 81 mg EC tablet Take 1 tablet (81 mg) by mouth once daily. 100 tablet 3 2/11/2025    chlorhexidine (Peridex) 0.12 % solution Swish and Spit 15 ml the night before and the morning of surgery. (2 Doses) 120 mL 0 2/12/2025 Morning    ferrous sulfate, 325 mg ferrous sulfate, (iron) tablet Take 1 tablet by mouth once daily (M-F). 90 tablet 1 2/11/2025    omeprazole OTC (PriLOSEC OTC) 20 mg EC tablet Take 1 tablet (20 mg) by mouth twice a day for 14 days, then take daily for 30 days more. 58 tablet 0 2/11/2025    ergocalciferol (Vitamin D-2) 1.25 MG (60310 UT) capsule TAKE 1 CAPSULE BY MOUTH 1 TIME PER WEEK (Patient taking differently: Take 1 capsule (1,250 mcg) by mouth 1 (one) time per week. Friday) 12 capsule 1 2/7/2025       Recent Labs: (past 2 days)  Recent Results (from the past 48 hours)   EKG 12 lead    Collection Time: 02/12/25  2:05 PM   Result Value Ref Range    Ventricular Rate 47 BPM    Atrial Rate 0 BPM    CO Interval 200 ms    QRS Duration 107 ms    QT Interval 477 ms    QTC Calculation(Bazett) 422 ms    P Axis 25 degrees    R Axis -9 degrees    T Axis -16 degrees    QRS Count 8 beats    Q Onset 252 ms    T Offset 490 ms    QTC Fredericia 439 ms   CBC and Auto Differential    Collection Time: 02/12/25  3:25 PM   Result Value Ref Range    WBC 12.1 (H) 4.4 - 11.3 x10*3/uL    nRBC 0.0 0.0 - 0.0 /100 WBCs    RBC 4.24 4.00 - 5.20 x10*6/uL    Hemoglobin 13.0 12.0 - 16.0 g/dL    Hematocrit 39.4 36.0 - 46.0 %    MCV 93 80 - 100 fL    MCH 30.7 26.0 - 34.0 pg    MCHC 33.0 32.0 - 36.0 g/dL    RDW 12.4 11.5 - 14.5 %    Platelets 255 150 - 450 x10*3/uL    Neutrophils % 94.2 40.0 - 80.0 %    Immature  Granulocytes %, Automated 0.6 0.0 - 0.9 %    Lymphocytes % 4.0 13.0 - 44.0 %    Monocytes % 0.9 2.0 - 10.0 %    Eosinophils % 0.1 0.0 - 6.0 %    Basophils % 0.2 0.0 - 2.0 %    Neutrophils Absolute 11.39 (H) 1.60 - 5.50 x10*3/uL    Immature Granulocytes Absolute, Automated 0.07 0.00 - 0.50 x10*3/uL    Lymphocytes Absolute 0.48 (L) 0.80 - 3.00 x10*3/uL    Monocytes Absolute 0.11 0.05 - 0.80 x10*3/uL    Eosinophils Absolute 0.01 0.00 - 0.40 x10*3/uL    Basophils Absolute 0.03 0.00 - 0.10 x10*3/uL   Comprehensive Metabolic Panel    Collection Time: 02/12/25  3:25 PM   Result Value Ref Range    Glucose 169 (H) 74 - 99 mg/dL    Sodium 135 (L) 136 - 145 mmol/L    Potassium 4.3 3.5 - 5.3 mmol/L    Chloride 101 98 - 107 mmol/L    Bicarbonate 27 21 - 32 mmol/L    Anion Gap 11 10 - 20 mmol/L    Urea Nitrogen 16 6 - 23 mg/dL    Creatinine 0.71 0.50 - 1.05 mg/dL    eGFR 88 >60 mL/min/1.73m*2    Calcium 8.9 8.6 - 10.3 mg/dL    Albumin 4.0 3.4 - 5.0 g/dL    Alkaline Phosphatase 85 33 - 136 U/L    Total Protein 6.6 6.4 - 8.2 g/dL    AST 14 9 - 39 U/L    Bilirubin, Total 0.4 0.0 - 1.2 mg/dL    ALT 16 7 - 45 U/L   Troponin I, High Sensitivity, Initial    Collection Time: 02/12/25  3:25 PM   Result Value Ref Range    Troponin I, High Sensitivity 3 0 - 13 ng/L   Magnesium    Collection Time: 02/12/25  3:25 PM   Result Value Ref Range    Magnesium 1.83 1.60 - 2.40 mg/dL   ECG 12 Lead    Collection Time: 02/12/25  6:25 PM   Result Value Ref Range    Ventricular Rate 78 BPM    Atrial Rate 78 BPM    VA Interval 224 ms    QRS Duration 86 ms    QT Interval 400 ms    QTC Calculation(Bazett) 456 ms    P Axis 45 degrees    R Axis -10 degrees    T Axis 18 degrees    QRS Count 13 beats    Q Onset 219 ms    P Onset 107 ms    P Offset 154 ms    T Offset 419 ms    QTC Fredericia 436 ms   Troponin, High Sensitivity, 1 Hour    Collection Time: 02/13/25  4:39 AM   Result Value Ref Range    Troponin I, High Sensitivity 3 0 - 13 ng/L   TSH with reflex to  Free T4 if abnormal    Collection Time: 02/13/25  4:39 AM   Result Value Ref Range    Thyroid Stimulating Hormone 0.50 0.44 - 3.98 mIU/L   Lipid Panel    Collection Time: 02/13/25  4:39 AM   Result Value Ref Range    Cholesterol 157 0 - 199 mg/dL    HDL-Cholesterol 45.5 mg/dL    Cholesterol/HDL Ratio 3.5     LDL Calculated 85 <=99 mg/dL    VLDL 27 0 - 40 mg/dL    Triglycerides 133 0 - 149 mg/dL    Non HDL Cholesterol 112 0 - 149 mg/dL   Hemoglobin A1C    Collection Time: 02/13/25  4:39 AM   Result Value Ref Range    Hemoglobin A1C 4.9 See comment %    Estimated Average Glucose 94 Not Established mg/dL       CV Studies:  EKG:  Encounter Date: 02/12/25   ECG 12 Lead   Result Value    Ventricular Rate 78    Atrial Rate 78    NC Interval 224    QRS Duration 86    QT Interval 400    QTC Calculation(Bazett) 456    P Axis 45    R Axis -10    T Axis 18    QRS Count 13    Q Onset 219    P Onset 107    P Offset 154    T Offset 419    QTC Fredericia 436    Narrative    Sinus rhythm with 2nd degree AV block (Mobitz I)  Minimal voltage criteria for LVH, may be normal variant  Borderline ECG  When compared with ECG of 12-FEB-2025 18:23, (unconfirmed)  Premature atrial complexes are no longer Present  NC interval has increased  Confirmed by Allen John ([id number]) on 2/13/2025 8:42:55 AM     Echocardiogram: No results found for this or any previous visit from the past 1825 days.    Stress Testing IMGRESULT([id number]): No results found for this or any previous visit from the past 1825 days.    Cardiac Catheterization: No results found for this or any previous visit from the past 1825 days.  No results found for this or any previous visit from the past 3650 days.     Cardiac Scoring: No results found for this or any previous visit from the past 1825 days.    AAA : No results found for this or any previous visit from the past 1825 days.    OTHER: No results found for this or any previous visit from the past 1825 days.    LAST IMAGING  RESULTS  ECG 12 Lead  Sinus rhythm with 2nd degree AV block (Mobitz I)  Minimal voltage criteria for LVH, may be normal variant  Borderline ECG  When compared with ECG of 12-FEB-2025 18:23, (unconfirmed)  Premature atrial complexes are no longer Present  TN interval has increased  Confirmed by Allen John ([id number]) on 2/13/2025 8:42:55 AM  EKG 12 lead  Sinus bradycardia  One instance of second deg AVB type II  Atrial premature complexes in couplets  Borderline T abnormalities, inferior leads    Confirmed by Wesley Mccoy ([id number]) on 2/13/2025 6:23:00 AM        Allen John DO, FACC, FACSTU  2/13/2025  9:19 AM

## 2025-02-13 NOTE — PROGRESS NOTES
"Physical Therapy    Physical Therapy Evaluation    Patient Name: Kayleen Rincon \"Reji"  MRN: [id number]  Department: [facility] NONV1  Room: 2021/2021-A  Today's Date: 2/13/2025   Time Calculation  Start Time: 1043  Stop Time: 1116  Time Calculation (min): 33 min    Assessment/Plan   PT Assessment  PT Assessment Results: Decreased strength, Decreased range of motion, Decreased endurance, Impaired balance, Decreased mobility, Decreased safety awareness, Impaired hearing, Pain, Orthopedic restrictions  Rehab Prognosis: Fair  Barriers to Discharge Home: Caregiver assistance, Cognition needs, Physical needs  Caregiver Assistance:  (SPOUSE MAY NEED ASSIST 2/2 HE AMB W/ CANE IF PT NEEDS PHYSICAL ASSSIT ATHOME)  Cognition Needs: 24hr supervision for safety awareness needed, Medication and/or medical management daily assist needed, Recollection or understanding of precautions/restrictions limited, Recollection or understanding of home exercise program limited  Physical Needs: Stair navigation into home limited by function/safety, Stair navigation to access bed limited by function/safety, Stair navigation to access bath limited by function/safety, Ambulating household distances limited by function/safety, 24hr mobility assistance needed, 24hr ADL assistance needed, High falls risk due to function or environment, Weight bearing precautions unable to be safely maintained  Evaluation/Treatment Tolerance: Patient limited by fatigue, Patient limited by pain  End of Session Communication: Bedside nurse (MOBILITY STATUS)  Assessment Comment: MIN A FOR BED MOBILTIY AND FWW WBAT RLE AMB, NEEDS VC FOR PRECAUTIONS & SAFETY, FALL RISK,  RECOMEND 24HR ASIST AND LOW REHAB  End of Session Patient Position: Up in chair, Alarm on (CALL LIGHT IN REACH)  IP OR SWING BED PT PLAN  Inpatient or Swing Bed: Inpatient  PT Plan  Treatment/Interventions: Bed mobility, Transfer training, Gait training, Stair training, Strengthening, Endurance training, " Range of motion  PT Plan: Ongoing PT  PT Frequency: 5 times per week (BID)  PT Discharge Recommendations: Low intensity level of continued care  Equipment Recommended upon Discharge:  (TBD)  PT Recommended Transfer Status: Assist x1 (FWW)  PT - OK to Discharge: Yes (WHEN MEDICALLY CLEARED)    Subjective   General Visit Information:  General  Reason for Referral: RECENT SURG, TKA, WBAT  Referred By: RAJINDER  Past Medical History Relevant to Rehab: OA R KNEE; DX: R TKA, POST SURG BRADYCARDIA, CHECKEDOUT BY CARDIOLOGY- 2ND DEGREE AV BLOCK W/ SINUS MEHUL; HX: OA, BREAST CA/ R MASTECTOMY, [place], OVARIAN CA, HYSTER, CATARACT SURG, ANEMIA, HYPOTHYROID, PAD  PT Missed Visit: Yes  Missed Visit Reason: Patient placed on medical hold (SPOKE TO RN PRIOR TO EVAL, SHE STATES PT. IS CHERY ADM TO SDU W/ CARDIAC CONCERNS POST SURG, WILL HOLD THERAPYTODAY, SEE FOR EVAL WHEN MEDICALLY STABLE)  Co-Treatment: OT  Co-Treatment Reason: FACILITATE MOBILTIY SAFETY  Prior to Session Communication: Bedside nurse (OK FOR THERAPY)  Patient Position Received: Bed, 3 rail up, Alarm on (ROOM 2021 ALERT IV DRESSINGON RLE; AGREES TO THERAPY)  Home Living:  Home Living  Home Living Comments: SPOUSE(AMB W/ CANE) 3 LEVEL HOME, 5 [place]/RAIL CAN STAY 1ST LEVEL, IN DEP AMB/ADL, TUB SHOWER/BARS & WALK IN  /BARS, HAS SEATE SHE CAN PLACE IN SHOWERS, DOESCHORES, HAS RAISED TOILET SEATS CNA SLEEP IN RECLINER IF SH ISNOT ABLE TO GET INTO HER HIGHBED-HAS TO STEP UP ONTO A STEP TO GET INTO BED, DRIVES  Prior Level of Function:     Precautions:  Precautions  Hearing/Visual Limitations: [place]  LE Weight Bearing Status:  (WBAT RLE)  Medical Precautions: Fall precautions  Post-Surgical Precautions: Right total knee precautions               Objective   Pain:  Pain Assessment  0-10 (Numeric) Pain Score: 3  Pain Type: Surgical pain, Acute pain (R KNEE)  Pain Interventions: Repositioned, Distraction, Emotional support  Cognition:  Cognition  Overall Cognitive Status: Within  Functional Limits  Orientation Level: Oriented X4  Safety/Judgement: Exceptions to WFL  Complex Functional Tasks: Minimal  Novel Situations: Minimal  Routine Tasks: Minimal  Other (Comment): 2/2 TKAPRECAUTIONS FWW AMB    General Assessments:  General Observation  General Observation: 10 REPS EXTO LEGS NSUPINE: ACTIVE ANUPAM AP, QS/GS, MIN A RLE AND ACTIVE LLE HS & HIP ABD; REVIEWED PRECAUTIONS               Activity Tolerance  Endurance: Endurance does not limit participation in activity    Sensation  Sensation Comment: NERVE BLOCK RLE    Strength  Strength Comments: ROM R KNEE 0-80 DEGREES, OTHERWISE WFL IN RLE  & LLE; STRENGTH RHIP/ANKLE 3/5, R KNEE 3-/5, LLE WFL FOR STRENGTH  Strength  Strength Comments: ROM R KNEE 0-80 DEGREES, OTHERWISE WFL IN RLE  & LLE; STRENGTH RHIP/ANKLE 3/5, R KNEE 3-/5, LLE WFL FOR STRENGTH                     Static Sitting Balance  Static Sitting-Comment/Number of Minutes: FAIR  Dynamic Sitting Balance  Dynamic Sitting-Comments: FAIR/FAIR-    Static Standing Balance  Static Standing-Comment/Number of Minutes: FAIR-  Dynamic Standing Balance  Dynamic Standing-Comments: FAIR-  Functional Assessments:  Bed Mobility  Bed Mobility:  (SUPINE>SIT MIN A X1 USING RAILS HOB 30 DEGREES, SITS EOB TOTAL 7 MIN SBA)    Transfers  Transfer:  (SIT<>STADN FWW WBAT RLE  MIN A X1, VC FOR PRECAUTIONS/SAFETY)    Ambulation/Gait Training  Ambulation/Gait Training Performed:  (FWW WBAT RLE MIN A 20 FT, R KNEE SLIGHTLY UNSTEADY UNDER PT. C/O  INCREASED KNEE PAIN W/ AMB, 3-4/10 FATIGUED W/ AMB)  Extremity/Trunk Assessments:     Outcome Measures:  [facility] Basic Mobility  Turning from your back to your side while in a flat bed without using bedrails: A little  Moving from lying on your back to sitting on the side of a flat bed without using bedrails: A little  Moving to and from bed to chair (including a wheelchair): A little  Standing up from a chair using your arms (e.g. wheelchair or bedside chair): A little  To  walk in hospital room: A lot  Climbing 3-5 steps with railing: A lot  Basic Mobility - Total Score: 16    Encounter Problems       Encounter Problems (Active)       Mobility       STG - Patient will ambulate (Not Progressing)       Start:  02/13/25    Expected End:  02/17/25       FWW WBAT RLE  100 FT SBA         STG - Patient will ascend and descend four to six stairs (Not Progressing)       Start:  02/13/25    Expected End:  02/17/25       RAILS, MIN A WBAT RLE          Goal 1 (Not Progressing)       Start:  02/13/25    Expected End:  02/28/25       20 REPS AROM RLE, RROM LLE INCREASING STRENGTH AND  ROM FOR GAIT STABILITY            PT Transfers       STG - Patient to transfer to and from sit to supine (Not Progressing)       Start:  02/13/25    Expected End:  02/14/25       HOB FLAT NO RAIL, SBA         STG - Patient will transfer sit to and from stand (Not Progressing)       Start:  02/13/25    Expected End:  02/15/25       FWW WBAT RLE USING PROPER TECHNIQUE            Pain - Adult              Education Documentation  Handouts, taught by Melissa Vergara PT at 2/13/2025  1:01 PM.  Learner: Patient  Readiness: Acceptance  Method: Explanation  Response: Needs Reinforcement  Comment: REVIEWED PRECAUTIONS, EX, SAFETY FOR MOBILTIY HERE & HOME    Precautions, taught by Melissa Vergara PT at 2/13/2025  1:01 PM.  Learner: Patient  Readiness: Acceptance  Method: Explanation  Response: Needs Reinforcement  Comment: REVIEWED PRECAUTIONS, EX, SAFETY FOR MOBILTIY HERE & HOME    Home Exercise Program, taught by Melissa Vergara PT at 2/13/2025  1:01 PM.  Learner: Patient  Readiness: Acceptance  Method: Explanation  Response: Needs Reinforcement  Comment: REVIEWED PRECAUTIONS, EX, SAFETY FOR MOBILTIY HERE & HOME    Mobility Training, taught by Melissa Vergara PT at 2/13/2025  1:01 PM.  Learner: Patient  Readiness: Acceptance  Method: Explanation  Response: Needs Reinforcement  Comment: REVIEWED PRECAUTIONS, EX, SAFETY FOR  MOBILTIY HERE & HOME    Education Comments  No comments found.

## 2025-02-13 NOTE — DISCHARGE SUMMARY
Discharge Diagnosis  Osteoarthritis of right knee    Issues Requiring Follow-Up  Follow-up with cardiologist and orthopedic surgeon as outpatient.    Discharge Meds     Medication List      START taking these medications     acetaminophen 325 mg tablet; Commonly known as: Tylenol; Take 3 tablets   (975 mg) by mouth every 8 hours if needed for mild pain (1 - 3).   aspirin 81 mg chewable tablet; Chew 1 tablet (81 mg) 2 times a day. To   prevent blood clots; Replaces: aspirin 81 mg EC tablet   celecoxib 200 mg capsule; Commonly known as: CeleBREX; Take 1 capsule   (200 mg) by mouth 2 times a day. For pain, inflammation, and swelling   cyclobenzaprine 5 mg tablet; Commonly known as: Flexeril; Take 1 tablet   (5 mg) by mouth 3 times a day as needed for muscle spasms for up to 10   days.   doxycycline 100 mg capsule; Commonly known as: Monodox; Take 1 capsule   (100 mg) by mouth 2 times a day for 7 days. To prevent infection   oxyCODONE 5 mg immediate release tablet; Commonly known as: Roxicodone;   Take 1 tablet (5 mg) by mouth every 6 hours if needed for severe pain (7 -   10) for up to 7 days.   pantoprazole 40 mg EC tablet; Commonly known as: ProtoNix; Take 1 tablet   (40 mg) by mouth once daily as needed (heartburn). Do not crush, chew, or   split.   sennosides 8.6 mg tablet; Commonly known as: Senokot; Take 1 tablet (8.6   mg) by mouth 2 times a day. To prevent constipation   traMADol 50 mg tablet; Commonly known as: Ultram; Take 1 tablet (50 mg)   by mouth every 6 hours if needed for severe pain (7 - 10) for up to 7   days.     CONTINUE taking these medications     ergocalciferol 1.25 MG (78775 UT) capsule; Commonly known as: Vitamin   D-2; TAKE 1 CAPSULE BY MOUTH 1 TIME PER WEEK   ferrous sulfate (325 mg ferrous sulfate) tablet; Commonly known as:   iron; Take 1 tablet by mouth once daily (M-F).     STOP taking these medications     aspirin 81 mg EC tablet; Replaced by: aspirin 81 mg chewable tablet    chlorhexidine 0.12 % solution; Commonly known as: Peridex   omeprazole OTC 20 mg EC tablet; Commonly known as: PriLOSEC OTC       Test Results Pending At Discharge  Pending Labs       No current pending labs.            Hospital Course  74-year-old female with past medical history of hyperlipidemia, PAD status post stent placement to left lower extremity, hypothyroidism, iron deficiency anemia presented for elective right knee replacement with Dr. Liu.  Post operatively she was noted to be bradycardic with an arrhythmia.  Patient was admitted for monitoring.  She likely had postop reaction to anesthesia and had change in rhythm but it was not sustained.  Cardiology evaluated patient and cleared her from that standpoint with the Holter monitoring and outpatient follow-up with them.  Patient would avoid AV anthony blocking agent.  She will follow-up with orthopedic surgery and cardiology as outpatient.    32 minutes spent in discharge timing    Pertinent Physical Exam At Time of Discharge  General: Not in acute distress, alert  HEENT: PERRLA, head intact and normocephalic  Neck: Normal to inspection  Lungs: Clear to auscultation, work of breathing within normal limit  Cardiac: Regular rate and rhythm  Abdomen: Soft nontender, positive bowel sounds  : Exam deferred  Skin: Intact  Hematology: No petechia or excessive ecchymosis  Musculoskeletal: Postop dressing on right knee is noted.  Neurological: Alert awake oriented, no focal deficit, cranial nerves grossly intact  Psych: No suicidal ideation or homicidal ideation    Outpatient Follow-Up  Future Appointments   Date Time Provider Department Center   2/17/2025  1:00 PM Janice Zapata, PT [id number] [facility]   3/11/2025 12:00 PM Lazara Liu, DO [id number] [facility]   6/12/2025  1:30 PM Kailash Triplett MD [id number] [facility]   12/18/2025  1:30 PM Kailash Triplett MD [id number] [facility]         Brenden Slade MD

## 2025-02-13 NOTE — HOSPITAL COURSE
74-year-old female with past medical history of hyperlipidemia, PAD status post stent placement to left lower extremity, hypothyroidism, iron deficiency anemia presented for elective right knee replacement with Dr. Liu.  Post operatively she was noted to be bradycardic with an arrhythmia.  Patient was admitted for monitoring.  She likely had postop reaction to anesthesia and had change in rhythm but it was not sustained.  Cardiology evaluated patient and cleared her from that standpoint with the Holter monitoring and outpatient follow-up with them.  Patient would avoid AV anthony blocking agent.  She will follow-up with orthopedic surgery and cardiology as outpatient.    32 minutes spent in discharge timing

## 2025-02-13 NOTE — PROGRESS NOTES
"POST-OPERATIVE PROGRESS NOTE      ============================  IMPRESSION/PLAN:  ============================  74 y.o. female s/p right total knee arthroplasty completed on 2/12/2025    PLAN:  -Weightbearing: Weight bearing as tolerated  -DVT Prophylaxis: Aspirin 81 mg twice daily for 4 weeks postoperatively  -Radiology Studies: X-rays were taken postoperatively in the PACU.  -Continue PT/OT Eval  -Disposition: Discharge home with home health today  -Discharge Instructions in EMR  -Follow-up: In the clinic with Dr. Liu in 3 weeks as previously scheduled        Kayleen Rincon seen and examined at bedside. Doing well, no issues overnight. Pain controlled with current treatment plan.     Review of Systems:   Constitutional: See HPI for pain assessment, No significant weight loss, recent trauma. Denies fevers/chills  Cardiovascular: No chest pain, shortness of breath  Respiratory: No difficulty breathing, cough  Gastrointestinal: No nausea, vomiting, diarrhea, constipation  Musculoskeletal: Noted in HPI, no arthralgias   Integumentary: No rashes, easy bruising, redness   Neurological: no numbness or tingling in extremities, no gait disturbances     Last Recorded Vitals  Blood pressure 112/58, pulse 57, temperature 36.4 °C (97.6 °F), temperature source Temporal, resp. rate 16, height 1.575 m (5' 2\"), weight 102 kg (224 lb 13.9 oz), SpO2 94%.      Patient Active Problem List   Diagnosis    Abdominal pain, acute, periumbilical    Acute abdominal pain in left lower quadrant    Anemia    Bilateral edema of lower extremity    Bilateral hearing loss    Breast asymmetry in female    Cat bite of right hand    Contact dermatitis    Cyst of right kidney    Diverticulosis of colon    Dysuria    Hair loss    Hepatic steatosis    History of cervical cancer    Hyperlipemia, mixed    Hypothyroidism    Iron deficiency anemia    Nodule of lower lobe of right lung    Nodule of middle lobe of right lung    Morbid obesity (Multi)    " Other microscopic hematuria    Peripheral arterial disease (CMS-HCC)    Postmenopausal bone loss    Varicose veins of right lower extremity    Ventral hernia    Vitamin D deficiency    Weight gain    Helicobacter pylori gastritis    Routine general medical examination at health care facility    Severe low back pain    Edema of lower extremity    History of elevated lipids    Double thoracic curve idiopathic scoliosis    Right knee injury, sequela    Osteoarthritis of right knee    Right knee pain    Systolic murmur    Delayed emergence from anesthesia    Arrhythmia       =================================  EXAM  =================================  Constitutional   General appearance: Alert and in no acute distress. Well developed, well nourished.    Eyes   External Eye, Conjunctiva and lids: Normal external exam - extraocular movements intact (EOMI).   Ears, Nose, Mouth, and Throat   Hearing: Normal.   Neck   Neck: No neck mass was observed. Supple.   Pulmonary   Respiratory effort: No respiratory distress.   Cardiovascular   Examination of extremities: No peripheral edema.   Psychiatric   Judgment and insight: Intact.   Orientation to person, place, and time: Alert and oriented x 3.   Mood and affect: Normal.   Musculoskeletal:   Operative lower extremity in neutral alignment.  Knee dressing is clean, dry, intact.  Passive range of motion with mild discomfort, appropriate for postoperative timeframe.  Dermis is intact.  Neurovascular status is intact.  2+ dorsalis pedis pulses, capillary refill <2 seconds. Mild swelling and warmth, no erythema. Negative Homans.       Rachel Pizarro PA-C

## 2025-02-13 NOTE — PROGRESS NOTES
"Physical Therapy    Physical Therapy Treatment    Patient Name: Kayelen Rincon \"Reji"  MRN: [id number]  Department: P[facility] CR NONV1  Room: 2021/2021-A  Today's Date: 2/13/2025  Time Calculation  Start Time: 1325  Stop Time: 1349  Time Calculation (min): 24 min         Assessment/Plan   PT Assessment  Barriers to Discharge Home: Caregiver assistance, Cognition needs, Physical needs  End of Session Communication: Bedside nurse  Assessment Comment: Notified RN of pt progress. pt is CGA to close s with gait and transfers. pt was able to perform 10 steps CGA.  End of Session Patient Position: Bed, 3 rail up, Alarm on     PT Plan  Treatment/Interventions: Bed mobility, Transfer training, Gait training, Stair training, Strengthening, Endurance training, Range of motion  PT Plan: Ongoing PT  PT Frequency: 5 times per week (BID)  PT Discharge Recommendations: Low intensity level of continued care  Equipment Recommended upon Discharge:  (TBD)  PT Recommended Transfer Status: Assist x1 (FWW)  PT - OK to Discharge: Yes (WHEN MEDICALLY CLEARED)      General Visit Information:   PT  Visit  PT Received On: 02/13/25  Response to Previous Treatment: Patient with no complaints from previous session.  General  Reason for Referral: RECENT SURG, TKA, WBAT  Referred By: RAJINDER  Past Medical History Relevant to Rehab: OA R KNEE; DX: R TKA, POST SURG BRADYCARDIA, CHECKEDOUT BY CARDIOLOGY- 2ND DEGREE AV BLOCK W/ SINUS MEHUL; HX: OA, BREAST CA/ R MASTECTOMY, [place], OVARIAN CA, HYSTER, CATARACT SURG, ANEMIA, HYPOTHYROID, PAD  Prior to Session Communication: Bedside nurse  Patient Position Received: Up in chair, Alarm on  Preferred Learning Style: verbal  General Comment: pt agreeable to PT and cleared by RN. pt hoping to dc home today.      Precautions:  Precautions  Hearing/Visual Limitations: [place]  LE Weight Bearing Status:  (WBAT RLE)  Medical Precautions: Fall precautions  Post-Surgical Precautions: Right total knee precautions        "       Pain:  Pain Assessment  Pain Assessment: 0-10  0-10 (Numeric) Pain Score: 3  Pain Type: Surgical pain  Pain Location: Knee  Pain Orientation: Right  Cognition:  Cognition  Overall Cognitive Status: Within Functional Limits            Treatments:  Bed Mobility  Bed Mobility: Yes  Bed Mobility 1  Bed Mobility 1: Sitting to supine  Level of Assistance 1: Contact guard  Bed Mobility Comments 1: cues for improved technique to make transition of RLE into bed easier.    Ambulation/Gait Training  Ambulation/Gait Training Performed: Yes  Ambulation/Gait Training 1  Surface 1: Level tile  Device 1: Rolling walker  Assistance 1: Contact guard  Quality of Gait 1: Narrow base of support, Diminished heel strike, Antalgic  Comments/Distance (ft) 1: 100'x2 with rest break in between. pt required cues for walker technique and sequencing.  Transfers  Transfer: Yes  Transfer 1  Technique 1: Sit to stand, Stand to sit  Transfer Device 1: Walker  Transfer Level of Assistance 1: Close supervision  Trials/Comments 1: cues for hand placement    Stairs  Stairs: Yes  Stairs  Rails 1: Bilateral  Curb Step 1: No  Device 1: No device, Railing  Assistance 1: Contact guard, Minimum assistance  Comment/Number of Steps 1: cues for sequencing and safety.    Outcome Measures:  [facility] Basic Mobility  Turning from your back to your side while in a flat bed without using bedrails: A little  Moving from lying on your back to sitting on the side of a flat bed without using bedrails: A little  Moving to and from bed to chair (including a wheelchair): A little  Standing up from a chair using your arms (e.g. wheelchair or bedside chair): A little  To walk in hospital room: A little  Climbing 3-5 steps with railing: A little  Basic Mobility - Total Score: 18    Education Documentation  Handouts, taught by Christen Bull PTA at 2/13/2025  1:58 PM.  Learner: Patient  Readiness: Acceptance  Method: Explanation  Response: Verbalizes Understanding, Needs  Reinforcement, Demonstrated Understanding    Precautions, taught by Christen Bull PTA at 2/13/2025  1:58 PM.  Learner: Patient  Readiness: Acceptance  Method: Explanation  Response: Verbalizes Understanding, Needs Reinforcement, Demonstrated Understanding    Home Exercise Program, taught by Christen Bull PTA at 2/13/2025  1:58 PM.  Learner: Patient  Readiness: Acceptance  Method: Explanation  Response: Verbalizes Understanding, Needs Reinforcement, Demonstrated Understanding    Mobility Training, taught by Christen Bull PTA at 2/13/2025  1:58 PM.  Learner: Patient  Readiness: Acceptance  Method: Explanation  Response: Verbalizes Understanding, Needs Reinforcement, Demonstrated Understanding    Education Comments  No comments found.               Encounter Problems       Encounter Problems (Active)       Mobility       STG - Patient will ambulate (Progressing)       Start:  02/13/25    Expected End:  02/17/25       FWW WBAT RLE  100 FT SBA         STG - Patient will ascend and descend four to six stairs (Met)       Start:  02/13/25    Expected End:  02/17/25    Resolved:  02/13/25    RAILS, MIN A WBAT RLE          Goal 1 (Progressing)       Start:  02/13/25    Expected End:  02/28/25       20 REPS AROM RLE, RROM LLE INCREASING STRENGTH AND  ROM FOR GAIT STABILITY            PT Transfers       STG - Patient to transfer to and from sit to supine (Progressing)       Start:  02/13/25    Expected End:  02/14/25       HOB FLAT NO RAIL, SBA         STG - Patient will transfer sit to and from stand (Progressing)       Start:  02/13/25    Expected End:  02/15/25       FWW WBAT RLE USING PROPER TECHNIQUE            Pain - Adult

## 2025-02-13 NOTE — PROGRESS NOTES
"Occupational Therapy    Initial Evaluation and Discharge    Patient Name: Kayleen Rincon \"Reji"  MRN: [id number]  Department: [facility] NONV1  Room: 2021/2021-A  Today's Date: 2/13/2025  Time Calculation  Start Time: 1052  Stop Time: 1112  Time Calculation (min): 20 min        Assessment:  OT Assessment: Pt is 74 year old female POD #1 from R TKA. PT demo'ing mobility and ADLs with SBA-MIN A, pt familiar with adaptive equipment and techniques for ADLs. No skilled OT intervention indicated at this time.  Prognosis: Good  Barriers to Discharge Home: No anticipated barriers  Evaluation/Treatment Tolerance: Patient tolerated treatment well  End of Session Communication: Bedside nurse  End of Session Patient Position: Bed, 3 rail up, Alarm on  Prognosis: Good  Evaluation/Treatment Tolerance: Patient tolerated treatment well  Plan:  No Skilled OT: No acute OT goals identified  OT Frequency: OT eval only  OT Discharge Recommendations: No further acute OT, Low intensity level of continued care  OT Recommended Transfer Status: Assist of 1  OT - OK to Discharge: Yes (When medically appropriate)       Subjective   Current Problem:  1. Arrhythmia  Holter Or Event Cardiac Monitor    Holter Or Event Cardiac Monitor    Referral to [facility]      2. Osteoarthritis of right knee, unspecified osteoarthritis type  acetaminophen (Tylenol) 325 mg tablet    aspirin 81 mg chewable tablet    celecoxib (CeleBREX) 200 mg capsule    cyclobenzaprine (Flexeril) 5 mg tablet    doxycycline (Monodox) 100 mg capsule    oxyCODONE (Roxicodone) 5 mg immediate release tablet    pantoprazole (ProtoNix) 40 mg EC tablet    sennosides (Senokot) 8.6 mg tablet    traMADol (Ultram) 50 mg tablet      3. Primary osteoarthritis of right knee        4. Heart block atrioventricular  Holter Or Event Cardiac Monitor    Holter Or Event Cardiac Monitor    Referral to Primary [facility]        General:  General  Reason for Referral: POD # 1 " R TKA  Referred By: Moira  Past Medical History Relevant to Rehab: HLD, PAD s/p stent in LLE, hypothyroidism, JOANNE  Family/Caregiver Present: No  Co-Treatment: PT  Co-Treatment Reason: to optimize mobility and safety while focusing on discipline specific needs  Prior to Session Communication: Bedside nurse  Patient Position Received: Bed, 3 rail up, Alarm on  General Comment: Pt pleasant and cooperative with therapy.  Precautions:  Hearing/Visual Limitations: [place]  Medical Precautions: Fall precautions  Post-Surgical Precautions: Right total knee precautions            Pain:  Pain Assessment  Pain Assessment: 0-10  0-10 (Numeric) Pain Score: 3  Pain Type: Surgical pain  Pain Location: Knee  Pain Orientation: Right    Objective   Cognition:  Overall Cognitive Status: Within Functional Limits  Orientation Level: Oriented X4           Home Living:  Type of Home: House  Lives With: Spouse  Home Adaptive Equipment: Walker rolling or standard, Cane, Reacher, Sock aid, Long-handled shoehorn  Home Layout: Two level, Full bath main level  Home Access: Stairs to enter with rails  Entrance Stairs-Number of Steps: 5  Bathroom Shower/Tub: Tub/shower unit, Walk-in shower  Bathroom Toilet: Adaptive toilet seating  Bathroom Equipment: Grab bars in shower, Shower chair with back, Raised toilet seat with rails  Prior Function:  Level of [place]: Independent with ADLs and functional transfers, Independent with homemaking with ambulation  ADL Assistance: Independent  Homemaking Assistance: Independent  Ambulatory Assistance: Independent  Prior Function Comments: (-) falls  IADL History:     ADL:  Eating Assistance: Independent  Grooming Assistance: Stand by (Anticipated)  Bathing Assistance: Minimal (Anticipated)  UE Dressing Assistance: Independent (Anticipated)  LE Dressing Assistance: Minimal  LE Dressing Deficit: Don/doff R sock, Thread RLE into underwear (Discussed techniques for lower body dressing with knee precautions and  potential use of AE. Pt reported familiar with technqiues due to  having recent hip surgery)  Toileting Assistance with Device: Minimal (Anticipated)  Activity Tolerance:  Endurance: Endurance does not limit participation in activity  Bed Mobility/Transfers: Bed Mobility  Bed Mobility: Yes  Bed Mobility 1  Bed Mobility 1: Supine to sitting  Level of Assistance 1: Minimum assistance    Transfers  Transfer: Yes  Transfer 1  Transfer From 1: Sit to, Stand to  Transfer to 1: Sit, Stand  Technique 1: Sit to stand, Stand to sit  Transfer Device 1: Walker  Transfer Level of Assistance 1: Minimum assistance, Contact guard      Functional Mobility:  Functional Mobility  Functional Mobility Performed: Yes  Functional Mobility 1  Surface 1: Level tile  Device 1: Rolling walker  Assistance 1: Minimum assistance, Contact guard  Comments 1: Functional mobility with FWW for household distance requiring MIN A and progressing to CGA, no LOb. Cuing for technique and turns.  Sitting Balance:  Static Sitting Balance  Static Sitting-Balance Support: No upper extremity supported  Static Sitting-Level of Assistance: Close supervision  Dynamic Sitting Balance  Dynamic Sitting-Balance Support: Bilateral upper extremity supported  Dynamic Sitting-Level of Assistance: Contact guard  Standing Balance:  Static Standing Balance  Static Standing-Balance Support: Bilateral upper extremity supported  Static Standing-Level of Assistance: Contact guard  Dynamic Standing Balance  Dynamic Standing-Balance Support: Bilateral upper extremity supported  Dynamic Standing-Level of Assistance: Contact guard   Modalities:     Vision:Vision - Basic Assessment  Current Vision: No visual deficits  Sensation:  Light Touch: No apparent deficits  Strength:  Strength Comments: JANIS mccann WFL  Perception:     Coordination:      Hand Function:  Gross Grasp: Functional  Coordination: Functional      Outcome Measures:[facility] Daily Activity  Putting on and taking  off regular lower body clothing: A little  Bathing (including washing, rinsing, drying): A little  Putting on and taking off regular upper body clothing: None  Toileting, which includes using toilet, bedpan or urinal: A little  Taking care of personal grooming such as brushing teeth: None  Eating Meals: None  Daily Activity - Total Score: 21        Education Documentation  Precautions, taught by Adelaida Hidalgo OT at 2/13/2025  2:26 PM.  Learner: Patient  Readiness: Acceptance  Method: Explanation  Response: Verbalizes Understanding    ADL Training, taught by Adelaida Hidalgo OT at 2/13/2025  2:26 PM.  Learner: Patient  Readiness: Acceptance  Method: Explanation  Response: Verbalizes Understanding    Education Comments  No comments found.        OP EDUCATION:       Goals: N/A

## 2025-02-13 NOTE — PROGRESS NOTES
02/13/25 1030   Discharge Planning   Living Arrangements Spouse/significant other   Support Systems Spouse/significant other;Children   Assistance Needed walker   Type of Residence Private residence   Number of Stairs to Enter Residence 5   Number of Stairs Within Residence 15   Do you have animals or pets at home? Yes   Type of Animals or Pets 8 [place] Terriers   Who is requesting discharge planning? Provider   Home or Post Acute Services None   Expected Discharge Disposition Home   Does the patient need discharge transport arranged? No   Financial Resource Strain   How hard is it for you to pay for the very basics like food, housing, medical care, and heating? Not very   Housing Stability   In the last 12 months, was there a time when you were not able to pay the mortgage or rent on time? N   In the past 12 months, how many times have you moved where you were living? 0   At any time in the past 12 months, were you homeless or living in a shelter (including now)? N   Transportation Needs   In the past 12 months, has lack of transportation kept you from medical appointments or from getting medications? no   In the past 12 months, has lack of transportation kept you from meetings, work, or from getting things needed for daily living? No   Stroke Family Assessment   Stroke Family Assessment Needed No   Intensity of Service   Intensity of Service 0-30 min     Spoke with pt explained TCC role in Care Transitions and verified address, phone number and emergency contact information. PCP is Dr Triplett and preferred pharmacy is [facility], denies issues obtaining or affording medications and takes as ordered. Pt is independent at baseline and lives at home with her , she feels safe. She is not diabetic, use oxygen or any agencies and previously no DME but she has a walker. Plan is return home with  and [facility] is to follow previously set up by surgeon. [facility] 22/22 per nursing. ADOD is today. Care  Transitions to follow. Glendy Alvarado BSN/RN-TCC

## 2025-02-13 NOTE — CARE PLAN
Problem: Mobility  Goal: STG - Patient will ambulate  Description: FWW WBAT RLE  100 FT SBA  Outcome: Not Progressing  Goal: STG - Patient will ascend and descend four to six stairs  Description: RAILS, MIN A WBAT RLE   Outcome: Not Progressing  Goal: Goal 1  Description: 20 REPS AROM RLE, RROM LLE INCREASING STRENGTH AND  ROM FOR GAIT STABILITY  Outcome: Not Progressing     Problem: PT Transfers  Goal: STG - Patient to transfer to and from sit to supine  Description: HOB FLAT NO RAIL, SBA  Outcome: Not Progressing  Goal: STG - Patient will transfer sit to and from stand  Description: FWW WBAT RLE USING PROPER TECHNIQUE  Outcome: Not Progressing

## 2025-02-17 ENCOUNTER — EVALUATION (OUTPATIENT)
Dept: PHYSICAL THERAPY | Facility: HOSPITAL | Age: 75
End: 2025-02-17
Payer: MEDICARE

## 2025-02-17 DIAGNOSIS — M25.561 ACUTE PAIN OF RIGHT KNEE: ICD-10-CM

## 2025-02-17 DIAGNOSIS — M17.12 PRIMARY OSTEOARTHRITIS OF LEFT KNEE: ICD-10-CM

## 2025-02-17 DIAGNOSIS — R26.9 GAIT ABNORMALITY: ICD-10-CM

## 2025-02-17 DIAGNOSIS — M17.11 PRIMARY OSTEOARTHRITIS OF RIGHT KNEE: Primary | ICD-10-CM

## 2025-02-17 PROCEDURE — 97110 THERAPEUTIC EXERCISES: CPT | Mod: GP | Performed by: PHYSICAL THERAPIST

## 2025-02-17 PROCEDURE — 97161 PT EVAL LOW COMPLEX 20 MIN: CPT | Mod: GP | Performed by: PHYSICAL THERAPIST

## 2025-02-17 ASSESSMENT — ENCOUNTER SYMPTOMS
OCCASIONAL FEELINGS OF UNSTEADINESS: 0
LOSS OF SENSATION IN FEET: 0
DEPRESSION: 0

## 2025-02-17 ASSESSMENT — PAIN DESCRIPTION - DESCRIPTORS: DESCRIPTORS: ACHING;SHOOTING

## 2025-02-17 ASSESSMENT — PAIN SCALES - GENERAL: PAINLEVEL_OUTOF10: 7

## 2025-02-17 ASSESSMENT — PAIN - FUNCTIONAL ASSESSMENT: PAIN_FUNCTIONAL_ASSESSMENT: 0-10

## 2025-02-17 NOTE — PROGRESS NOTES
"Physical Therapy    Physical Therapy Lower Extremity Evaluation    Patient Name: Kayleen Rincon \"Reji"  MRN: [id number]  : 1950   Today's Date: 2025  Time Calculation  Start Time: 1300  Stop Time: 1345  Time Calculation (min): 45 min  PT Evaluation Time Entry  PT Evaluation (Low) Time Entry: 30  PT Therapeutic Procedures Time Entry  Therapeutic Exercise Time Entry: 15             Visit: 1  Auth: none    Current Problem  Problem List Items Addressed This Visit             ICD-10-CM       Musculoskeletal and Injuries    Osteoarthritis of right knee - Primary M17.11    Relevant Orders    Follow Up In Physical Therapy    Right knee pain M25.561       Symptoms and Signs    Gait abnormality R26.9     Other Visit Diagnoses         Codes    Primary osteoarthritis of left knee     M17.12             General  Name and  confirmed with patient on this date.       Ambulatory Screening Summary     Screening  Frequency  Date Last Completed   Spiritual and Cultural Beliefs   Screening  each visit or episode of care 2025   Falls Risk Screening  every ambulatory visit 2025  1:07 PM   Pain Screening  annually at primary care visit  2024   Domestic Violence screening  annually at primary care visit 2024   Elder Abuse Screening  annually at primary care visit 2023   Depression Screening  annually in the primary care setting 2025   Suicide Risk Screening  annually in the primary care setting 2025   Nutrition and Food Insecurity   Screening  at least annually at primary care visit  2025   Key Learner  annually in the primary care setting 2025   Drug Screen  2025  8:55 AM   Alcohol Screen  2025  8:55 AM   Advance Directive           Precautions   R TKA post-op px       Pain  Pain Assessment: 0-10  0-10 (Numeric) Pain Score: 7  Pain Type: Surgical pain  Pain Location: Knee  Pain Orientation: Right  Pain Descriptors: Aching, Shooting  Pain Frequency: " Constant/continuous  Clinical Progression: Gradually improving  Pain Interventions: Medication (See MAR)  Response to Interventions: Decrease in pain    SUBJECTIVE:   Chief complaint:  R TKA 2-12-25 by Dr. Liu.    Pain Better: ice, elevation  Pain Worse: weight bearing, transitional movements  Prior level of function: walking indep, walking 3 miles a day until her knee started to bother her  Prior tx: gel shots, cortisone injections  Current limitations: stairs, walking, transitional movements  Home setup: 2 story home with bed and bath on second floor with 3 ZULEIKA. Sleeping on the first floor, just went up today for the first time to take a shower.  Work:   Patients goal: Knee stop hurting and walk normally. Be able to walk for exercise.     OBJECTIVE:    LEFS: 36/80    Lower Extremity ROM: (WNL unless noted)  MMT RIGHT LEFT   Hip Flexion     Hip Extension     Hip Abduction     Hip Adduction     Knee Extension -10 0   Knee Flexion 90 125   Ankle DF     Ankle PF     Ankle INV     Ankle EV       Lower Extremity Strength: 5/5 unless documented below:  ROM in Degrees  RIGHT LEFT   Hip Flexion 2+ 4   Hip Extension 3- 3-   Hip Abduction 3- 4   Hip Adduction 3- 4   Knee Extension 3- 4-   Knee Flexion 3+ 4-   Ankle DF 4- 4   Ankle PF 2+ 3-     Posture: forward head/rounded shoulders, forward flexed posture in standing  Gait mechanics: pt ambulates with FWW, decreased weight acceptance R,decreased margarette and step length. Decreased hip/knee flexion during swing on R LE.  Palpation: tenderness to light palpation near incision site  Neurological symptoms: none  Incision: steristrip covers the incision site, integument shows no signs of injection  Edema: non-pitting edema -  severe in the knee/calf and ankle/foot    Functional Outcome:   Other Measures  Lower Extremity Funtional Score (LEFS): 36    TREATMENT:     EXERCISES Date Date Date Date    REPS REPS REPS REPS          Aisha                                                  Mat:       Heel slides       Quad sets c towel under knee       SLR flexion with quad set       Supine clamshells with band       Supine hip adduction c ball                     Parallel bars:       Sit<>stands       Tandem walking       Lateral walking       Backwards walking       3-Way SLR       Step-up/over       Lateral step-ups              Manual:       Patellar mobs       PROM knee                     Ice PRN              HEP 15 min           ASSESSEMENT  Pt is a 74 y.o. referred to physical therapy for a dx of s/p R TKA by Lazara Liu DO . Pt presents with gait abnormalities, decreased strength and ROM, decreased balance, swelling and pain. This pt would benefit from a therapy program to restore prior level of function, reduce pain, increase AROM, increase strength, and improve gait and balance.     The physical therapy prognosis is good for the patient to achieve their goals.   The pt tolerated therapy treatment today well with no adverse effects.  Barriers to therapy include:  none    PLAN  The pt will be seen 2 days a week for 4 weeks.      The pt has been educated about the risks and benefits of physical therapy and gives consent for treatment.     The patient will benefit from physical therapy treatment to include: Treatment/Interventions: Cryotherapy, Education/ Instruction, Gait training, Manual therapy, Neuromuscular re-education, Therapeutic activities, Therapeutic exercises    Goals:  Active       PT Problem       PT Goal 1       Start:  02/17/25    Expected End:  04/17/25       Pt will be able to teach back 90% of HEP in order to maintain gains made in PT.          PT Goal 2       Start:  02/17/25    Expected End:  04/17/25       Pt will increase LE strength to 4+/5 or greater in order to improve gait and tolerance to functional activities.          PT Goal 3       Start:  02/17/25    Expected End:  04/17/25       Pt will increase R knee ROM to 0-125 degrees in order to reduce  pain and improve tolerance to functional mobility.          PT Goal 4       Start:  02/17/25    Expected End:  04/17/25       Pt will decrease pain to 2/10 at worst, with pt able to manage symptoms with HEP, positioning and modalities independently.          PT Goal 5       Start:  02/17/25    Expected End:  04/17/25       Pt will be able to ambulate 300+ft with no AD in order to indicate tolerance to community ambulation with minimal gait deviations.

## 2025-02-20 ENCOUNTER — TREATMENT (OUTPATIENT)
Dept: PHYSICAL THERAPY | Facility: HOSPITAL | Age: 75
End: 2025-02-20
Payer: MEDICARE

## 2025-02-20 DIAGNOSIS — M17.11 PRIMARY OSTEOARTHRITIS OF RIGHT KNEE: ICD-10-CM

## 2025-02-20 PROCEDURE — 97140 MANUAL THERAPY 1/> REGIONS: CPT | Mod: GP,CQ

## 2025-02-20 PROCEDURE — 97110 THERAPEUTIC EXERCISES: CPT | Mod: GP,CQ

## 2025-02-20 ASSESSMENT — PAIN - FUNCTIONAL ASSESSMENT: PAIN_FUNCTIONAL_ASSESSMENT: 0-10

## 2025-02-20 ASSESSMENT — PAIN SCALES - GENERAL: PAINLEVEL_OUTOF10: 6

## 2025-02-20 ASSESSMENT — PAIN DESCRIPTION - DESCRIPTORS: DESCRIPTORS: ACHING;SHOOTING

## 2025-02-20 NOTE — PROGRESS NOTES
"Physical Therapy    Physical Therapy Treatment    Patient Name: Kayleen Rincon  MRN: [id number]  : 1950  Today's Date: 2025  Time Calculation  Start Time: 1130  Stop Time: 1215  Time Calculation (min): 45 min    PT Therapeutic Procedures Time Entry  Manual Therapy Time Entry: 10  Therapeutic Exercise Time Entry: 35          VISIT:# 2    Current Problem  Problem List Items Addressed This Visit             ICD-10-CM       Musculoskeletal and Injuries    Osteoarthritis of right knee M17.11        Subjective    Name and  confirmed with patient at the beginning of the session. She reports that she is having pain in her R knee and rates the pain a 6/10. She states that the pain is radiating down the knee and into her distal R leg. She has not had any recent falls. She states that she has been using a walker at home but started not using an assistive device today. Pt has noticeable swelling in her R knee and distal leg that is causing some feelings of tightness in her knee.     Pain  Pain Assessment: 0-10  0-10 (Numeric) Pain Score: 6  Pain Type: Surgical pain  Pain Location: Knee  Pain Orientation: Right  Pain Radiating Towards: Distal right leg  Pain Descriptors: Aching, Shooting  Pain Frequency: Constant/continuous  Clinical Progression: Gradually improving  Pain Interventions: Medication (See MAR)       Objective       R knee AROM: 6-105*  R Knee PROM: 3-111*          Precautions   R TKA post-op px       Treatments:     EXERCISES Date 2025  Date Date Date    REPS REPS REPS REPS          Nustep L3 10 min                                                Mat:       Heel slides 10x5\"H      Quad sets c towel under knee 10x5\"H      SLR flexion with quad set 1x10      Supine clamshells with band       Supine hip adduction c ball                     Parallel bars:       Sit<>stands       Tandem walking 2 laps w/ UE support      Lateral walking 2 laps no UE      Backwards walking 2 laps w/ UE support      3-Way " "SLR       Step-up/over 8 in 2x10 RLE      Lateral step-ups 5 in 2x10 RLE             Manual:       Patellar mobs 2x10 all dir.      PROM knee 10 min                    Ice PRN              HEP                             Assessment:   At the end of the session pt reported that she had no pain in her R knee and stated that it felt more \"loose\" than when she initially arrived. She tolerated treatment well and was able to complete exercises with no increased pain or discomfort.       Plan:   OP PT Plan  Treatment/Interventions: Cryotherapy, Education/ Instruction, Gait training, Manual therapy, Neuromuscular re-education, Therapeutic activities, Therapeutic exercises  PT Plan: Skilled PT  PT Frequency: 2 times per week  Duration: 8 weeks  Onset Date: 02/12/25  Certification Period Start Date: 02/17/25  Certification Period End Date: 05/17/25  Rehab Potential: Good  Plan of Care Agreement: Patient    Goals:  Active       PT Problem       PT Goal 1       Start:  02/17/25    Expected End:  04/17/25       Pt will be able to teach back 90% of HEP in order to maintain gains made in PT.          PT Goal 2       Start:  02/17/25    Expected End:  04/17/25       Pt will increase LE strength to 4+/5 or greater in order to improve gait and tolerance to functional activities.          PT Goal 3       Start:  02/17/25    Expected End:  04/17/25       Pt will increase R knee ROM to 0-125 degrees in order to reduce pain and improve tolerance to functional mobility.          PT Goal 4       Start:  02/17/25    Expected End:  04/17/25       Pt will decrease pain to 2/10 at worst, with pt able to manage symptoms with HEP, positioning and modalities independently.          PT Goal 5       Start:  02/17/25    Expected End:  04/17/25       Pt will be able to ambulate 300+ft with no AD in order to indicate tolerance to community ambulation with minimal gait deviations.             "

## 2025-02-22 PROCEDURE — RXMED WILLOW AMBULATORY MEDICATION CHARGE

## 2025-02-26 ENCOUNTER — TREATMENT (OUTPATIENT)
Dept: PHYSICAL THERAPY | Facility: HOSPITAL | Age: 75
End: 2025-02-26
Payer: MEDICARE

## 2025-02-26 ENCOUNTER — PHARMACY VISIT (OUTPATIENT)
Dept: PHARMACY | Facility: CLINIC | Age: 75
End: 2025-02-26
Payer: COMMERCIAL

## 2025-02-26 DIAGNOSIS — M17.11 PRIMARY OSTEOARTHRITIS OF RIGHT KNEE: ICD-10-CM

## 2025-02-26 PROCEDURE — 97140 MANUAL THERAPY 1/> REGIONS: CPT | Mod: GP,CQ | Performed by: PHYSICAL THERAPY ASSISTANT

## 2025-02-26 PROCEDURE — 97110 THERAPEUTIC EXERCISES: CPT | Mod: GP,CQ | Performed by: PHYSICAL THERAPY ASSISTANT

## 2025-02-26 ASSESSMENT — PAIN SCALES - GENERAL: PAINLEVEL_OUTOF10: 3

## 2025-02-26 ASSESSMENT — PAIN DESCRIPTION - DESCRIPTORS: DESCRIPTORS: ACHING;SHOOTING

## 2025-02-26 ASSESSMENT — PAIN - FUNCTIONAL ASSESSMENT: PAIN_FUNCTIONAL_ASSESSMENT: 0-10

## 2025-02-26 NOTE — PROGRESS NOTES
"Physical Therapy    Physical Therapy Treatment    Patient Name: Kayleen Rincon  MRN: [id number]  : 1950  Today's Date: 2025  Time Calculation  Start Time: 1300  Stop Time: 1345  Time Calculation (min): 45 min    PT Therapeutic Procedures Time Entry  Manual Therapy Time Entry: 10  Therapeutic Exercise Time Entry: 35          VISIT:# 3    Current Problem  1. Primary osteoarthritis of right knee  Follow Up In Physical Therapy          Subjective pt reported she has been doing HEP daily and has not fallen. She is doing steps 3x a day at home. Pt feels like she is getting stronger.                 Pain  Pain Assessment: 0-10  0-10 (Numeric) Pain Score: 3  Pain Type: Surgical pain  Pain Location: Knee  Pain Orientation: Right  Pain Radiating Towards: distal right leg  Pain Descriptors: Aching, Shooting  Pain Frequency: Constant/continuous  Clinical Progression: Gradually improving       Objective increases and additions as per flow sheet.          Treatments:  EXERCISES Date 2025  Date 25 Date Date    REPS REPS REPS REPS          Nustep L3 10 min L4  10 min            DLP  SLP  HR                                   Mat:       Heel slides 10x5\"H 10X5 H     Quad sets c towel under knee 10x5\"H 15X5\"H     SLR flexion with quad set 1x10 1x10     Supine clamshells with band       Supine hip adduction c ball                     Parallel bars:       Sit<>stands       Tandem walking 2 laps w/ UE support 2 laps w/o UE support     Lateral walking 2 laps no UE 2 laps w/o UE support     Backwards walking 2 laps w/ UE support 2 laps w/o UE support     3-Way SLR  2x10     Mini squats  2x10     Step-up/over 8 in 2x10 RLE      Lateral step-ups 5 in 2x10 RLE             Manual:       Patellar mobs 2x10 all dir.      PROM knee 10 min X10 min     AROM  -2 >105     PROM  0>115     Ice PRN              HEP                    Assessment:  PT Assessment  Assessment Comment: pt with increased ROM this session. No increased pain " just soreness at end of session,.       Plan:  OP PT Plan  Treatment/Interventions: Cryotherapy, Education/ Instruction, Gait training, Manual therapy, Neuromuscular re-education, Therapeutic activities, Therapeutic exercises  PT Plan: Skilled PT  PT Frequency: 2 times per week  Duration: 8 weeks  Onset Date: 02/12/25  Certification Period Start Date: 02/17/25  Certification Period End Date: 05/17/25  Rehab Potential: Good  Plan of Care Agreement: Patient    Goals:  Active       PT Problem       PT Goal 1       Start:  02/17/25    Expected End:  04/17/25       Pt will be able to teach back 90% of HEP in order to maintain gains made in PT.          PT Goal 2       Start:  02/17/25    Expected End:  04/17/25       Pt will increase LE strength to 4+/5 or greater in order to improve gait and tolerance to functional activities.          PT Goal 3       Start:  02/17/25    Expected End:  04/17/25       Pt will increase R knee ROM to 0-125 degrees in order to reduce pain and improve tolerance to functional mobility.          PT Goal 4       Start:  02/17/25    Expected End:  04/17/25       Pt will decrease pain to 2/10 at worst, with pt able to manage symptoms with HEP, positioning and modalities independently.          PT Goal 5       Start:  02/17/25    Expected End:  04/17/25       Pt will be able to ambulate 300+ft with no AD in order to indicate tolerance to community ambulation with minimal gait deviations.

## 2025-03-03 ENCOUNTER — TREATMENT (OUTPATIENT)
Dept: PHYSICAL THERAPY | Facility: HOSPITAL | Age: 75
End: 2025-03-03
Payer: MEDICARE

## 2025-03-03 DIAGNOSIS — M17.11 PRIMARY OSTEOARTHRITIS OF RIGHT KNEE: ICD-10-CM

## 2025-03-03 PROCEDURE — 97140 MANUAL THERAPY 1/> REGIONS: CPT | Mod: GP,CQ | Performed by: PHYSICAL THERAPY ASSISTANT

## 2025-03-03 PROCEDURE — 97110 THERAPEUTIC EXERCISES: CPT | Mod: GP,CQ | Performed by: PHYSICAL THERAPY ASSISTANT

## 2025-03-03 ASSESSMENT — PAIN - FUNCTIONAL ASSESSMENT: PAIN_FUNCTIONAL_ASSESSMENT: 0-10

## 2025-03-03 ASSESSMENT — PAIN SCALES - GENERAL: PAINLEVEL_OUTOF10: 3

## 2025-03-03 ASSESSMENT — PAIN DESCRIPTION - DESCRIPTORS: DESCRIPTORS: ACHING;SHOOTING

## 2025-03-03 NOTE — PROGRESS NOTES
"Physical Therapy    Physical Therapy Treatment    Patient Name: Kayleen Rincon  MRN: [id number]  : 1950  Today's Date: 3/3/2025  Time Calculation  Start Time: 1300  Stop Time: 1347  Time Calculation (min): 47 min    PT Therapeutic Procedures Time Entry  Manual Therapy Time Entry: 10  Therapeutic Exercise Time Entry: 37          VISIT:# 4    Current Problem  1. Primary osteoarthritis of right knee  Follow Up In Physical Therapy          Subjective pt reported she is icing and elevating at home to decrease pain and swelling. No fall since last visit. Pt feels like SLR is still difficult. She is unable to sleep in bed because she can't get comfortable.       Precautions  Precautions  Medical Precautions: Fall precautions       Pain  Pain Assessment: 0-10  0-10 (Numeric) Pain Score: 3  Pain Type: Surgical pain  Pain Location: Knee  Pain Orientation: Right  Pain Radiating Towards: distal R leg  Pain Descriptors: Aching, Shooting  Pain Frequency: Constant/continuous  Clinical Progression: Gradually improving       Objective increases and changes as per flow sheet.           Treatments:  EXERCISES Date 2025  Date 25 Date3/3/25 Date    REPS REPS REPS REPS          Nustep L3 10 min L4  10 min L4 x10           DLP  SLP  HR   6B 2x15  5B 2x15  5B 2x15           HS stretch seated   3x30\"H    Rocker board stretch   3x30\"H           Mat:       Heel slides 10x5\"H 10X5 H     Quad sets c towel under knee 10x5\"H 15X5\"H     SLR flexion with quad set 1x10 1x10 x10    Supine clamshells with band       Supine hip adduction c ball                     Parallel bars:       Sit<>stands       Tandem walking 2 laps w/ UE support 2 laps w/o UE support     Lateral walking 2 laps no UE 2 laps w/o UE support     Backwards walking 2 laps w/ UE support 2 laps w/o UE support     3-Way SLR  2x10 X20     Mini squats  2x10 x20    Step-up/over 8 in 2x10 RLE      Lateral step-ups 5 in 2x10 RLE             Manual:       Patellar mobs 2x10 " all dir.      PROM knee 10 min X10 min 10'    AROM  -2 >105 0>107    PROM  0>115 0>115    Ice PRN              HEP                    Assessment:  PT Assessment  Assessment Comment: pt tolerated treatment well this date. She was able to tolerated additons without any complaints. Decreased tightness at end of sessionbut no change in pain,       Plan:  OP PT Plan  Treatment/Interventions: Cryotherapy, Education/ Instruction, Gait training, Manual therapy, Neuromuscular re-education, Therapeutic activities, Therapeutic exercises  PT Plan: Skilled PT  PT Frequency: 2 times per week  Duration: 8 weeks  Onset Date: 02/12/25  Certification Period Start Date: 02/17/25  Certification Period End Date: 05/17/25  Rehab Potential: Good  Plan of Care Agreement: Patient    Goals:  Active       PT Problem       PT Goal 1       Start:  02/17/25    Expected End:  04/17/25       Pt will be able to teach back 90% of HEP in order to maintain gains made in PT.          PT Goal 2       Start:  02/17/25    Expected End:  04/17/25       Pt will increase LE strength to 4+/5 or greater in order to improve gait and tolerance to functional activities.          PT Goal 3       Start:  02/17/25    Expected End:  04/17/25       Pt will increase R knee ROM to 0-125 degrees in order to reduce pain and improve tolerance to functional mobility.          PT Goal 4       Start:  02/17/25    Expected End:  04/17/25       Pt will decrease pain to 2/10 at worst, with pt able to manage symptoms with HEP, positioning and modalities independently.          PT Goal 5       Start:  02/17/25    Expected End:  04/17/25       Pt will be able to ambulate 300+ft with no AD in order to indicate tolerance to community ambulation with minimal gait deviations.

## 2025-03-05 ENCOUNTER — TREATMENT (OUTPATIENT)
Dept: PHYSICAL THERAPY | Facility: HOSPITAL | Age: 75
End: 2025-03-05
Payer: MEDICARE

## 2025-03-05 DIAGNOSIS — M17.11 PRIMARY OSTEOARTHRITIS OF RIGHT KNEE: ICD-10-CM

## 2025-03-05 PROCEDURE — 97110 THERAPEUTIC EXERCISES: CPT | Mod: GP,CQ | Performed by: PHYSICAL THERAPY ASSISTANT

## 2025-03-05 ASSESSMENT — PAIN - FUNCTIONAL ASSESSMENT: PAIN_FUNCTIONAL_ASSESSMENT: 0-10

## 2025-03-05 ASSESSMENT — PAIN DESCRIPTION - DESCRIPTORS: DESCRIPTORS: ACHING;SHOOTING

## 2025-03-05 ASSESSMENT — PAIN SCALES - GENERAL: PAINLEVEL_OUTOF10: 3

## 2025-03-05 NOTE — PROGRESS NOTES
"Physical Therapy    Physical Therapy Treatment    Patient Name: Kayleen Rinocn  MRN: [id number]  : 1950  Today's Date: 3/5/2025  Time Calculation  Start Time: 1300  Stop Time: 1350  Time Calculation (min): 50 min    PT Therapeutic Procedures Time Entry  Manual Therapy Time Entry: 10  Therapeutic Exercise Time Entry: 40          VISIT:# 5    Current Problem  1. Primary osteoarthritis of right knee  Follow Up In Physical Therapy          Subjective Kayleen reported she feels like therapy is helping. She is sore after treatment but it only lasts little while.        Precautions  Precautions  Medical Precautions: Fall precautions       Pain  Pain Assessment: 0-10  0-10 (Numeric) Pain Score: 3  Pain Type: Surgical pain  Pain Location: Knee  Pain Orientation: Right  Pain Radiating Towards: distal R leg  Pain Descriptors: Aching, Shooting  Pain Frequency: Constant/continuous  Clinical Progression: Gradually improving       Objective additions as per flow sheet.            Treatments:  EXERCISES Date 2025  Date 25 Date3/3/25 Date 3/5/25    REPS REPS REPS REPS          Nustep L3 10 min L4  10 min L4 x10 L4 x 10'          DLP  SLP  HR   6B 2x15  5B 2x15  5B 2x15 6B 2x20  5B 2x15  5B 2x15          HS stretch seated   3x30\"H 3x30\" H   Rocker board stretch   3x30\"H 3x30 \" H          Mat:       Heel slides 10x5\"H 10X5 H     Quad sets c towel under knee 10x5\"H 15X5\"H     SLR flexion with quad set 1x10 1x10 x10    Supine clamshells with band       Supine hip adduction c ball                     Parallel bars:       Sit<>stands       Tandem walking 2 laps w/ UE support 2 laps w/o UE support     Lateral walking 2 laps no UE 2 laps w/o UE support     Backwards walking 2 laps w/ UE support 2 laps w/o UE support     3-Way SLR  2x10 X20     Qhip   Flex  abduction      5#   2x10  10# 2x10   Mini squats  2x10 x20    Step-up/over 8 in 2x10 RLE      Lateral step-ups 5 in 2x10 RLE   8in x10 ANUPAM       8\" x10 ANUPAM   Manual:     "   Patellar mobs 2x10 all dir.      PROM knee 10 min X10 min 10' 10'   AROM  -2 >105 0>107 0>112 deg   PROM  0>115 0>115 0>116   Ice PRN              HEP           Assessment:  PT Assessment  Assessment Comment: pt is making good progress with strength and ROM She is motivated and compliant to achieve all goals       Plan:  OP PT Plan  Treatment/Interventions: Cryotherapy, Education/ Instruction, Gait training, Manual therapy, Neuromuscular re-education, Therapeutic activities, Therapeutic exercises  PT Plan: Skilled PT  PT Frequency: 2 times per week  Duration: 8 weeks  Onset Date: 02/12/25  Certification Period Start Date: 02/17/25  Certification Period End Date: 05/17/25    Goals:  Active       PT Problem       PT Goal 1       Start:  02/17/25    Expected End:  04/17/25       Pt will be able to teach back 90% of HEP in order to maintain gains made in PT.          PT Goal 2       Start:  02/17/25    Expected End:  04/17/25       Pt will increase LE strength to 4+/5 or greater in order to improve gait and tolerance to functional activities.          PT Goal 3       Start:  02/17/25    Expected End:  04/17/25       Pt will increase R knee ROM to 0-125 degrees in order to reduce pain and improve tolerance to functional mobility.          PT Goal 4       Start:  02/17/25    Expected End:  04/17/25       Pt will decrease pain to 2/10 at worst, with pt able to manage symptoms with HEP, positioning and modalities independently.          PT Goal 5       Start:  02/17/25    Expected End:  04/17/25       Pt will be able to ambulate 300+ft with no AD in order to indicate tolerance to community ambulation with minimal gait deviations.

## 2025-03-07 ENCOUNTER — TELEPHONE (OUTPATIENT)
Dept: ORTHOPEDIC SURGERY | Facility: CLINIC | Age: 75
End: 2025-03-07

## 2025-03-07 LAB — BODY SURFACE AREA: 2.11 M2

## 2025-03-07 PROCEDURE — 93248 EXT ECG>7D<15D REV&INTERPJ: CPT | Performed by: INTERNAL MEDICINE

## 2025-03-07 NOTE — TELEPHONE ENCOUNTER
Pt called in requesting a refill of her OXY as well as her Tramadol.   Pts surg was on 02/12/2025 for RTKA.   Pharmacy [facility].

## 2025-03-09 DIAGNOSIS — M17.11 PRIMARY OSTEOARTHRITIS OF RIGHT KNEE: Primary | ICD-10-CM

## 2025-03-09 RX ORDER — OXYCODONE HYDROCHLORIDE 5 MG/1
5 TABLET ORAL EVERY 6 HOURS PRN
Qty: 28 TABLET | Refills: 0 | Status: SHIPPED | OUTPATIENT
Start: 2025-03-09 | End: 2025-03-16

## 2025-03-09 RX ORDER — TRAMADOL HYDROCHLORIDE 50 MG/1
50 TABLET ORAL EVERY 6 HOURS PRN
Qty: 28 TABLET | Refills: 0 | Status: SHIPPED | OUTPATIENT
Start: 2025-03-09 | End: 2025-03-16

## 2025-03-10 ENCOUNTER — TREATMENT (OUTPATIENT)
Dept: PHYSICAL THERAPY | Facility: HOSPITAL | Age: 75
End: 2025-03-10
Payer: MEDICARE

## 2025-03-10 DIAGNOSIS — M17.11 PRIMARY OSTEOARTHRITIS OF RIGHT KNEE: ICD-10-CM

## 2025-03-10 PROCEDURE — 97110 THERAPEUTIC EXERCISES: CPT | Mod: GP,CQ | Performed by: PHYSICAL THERAPY ASSISTANT

## 2025-03-10 ASSESSMENT — PAIN SCALES - GENERAL: PAINLEVEL_OUTOF10: 3

## 2025-03-10 ASSESSMENT — PAIN - FUNCTIONAL ASSESSMENT: PAIN_FUNCTIONAL_ASSESSMENT: 0-10

## 2025-03-10 ASSESSMENT — PAIN DESCRIPTION - DESCRIPTORS: DESCRIPTORS: ACHING;SHOOTING

## 2025-03-10 NOTE — PROGRESS NOTES
"Physical Therapy    Physical Therapy Treatment    Patient Name: Kayleen Rincon  MRN: [id number]  : 1950  Today's Date: 3/10/2025  Time Calculation  Start Time: 1300  Stop Time: 1347  Time Calculation (min): 47 min    PT Therapeutic Procedures Time Entry  Therapeutic Exercise Time Entry: 47          VISIT:# 6    Current Problem  1. Primary osteoarthritis of right knee  Follow Up In Physical Therapy          Subjective Kayleen reported her pain has been worse the last couple days. She sees dr Liu on 3/11/25       Precautions  Precautions  Medical Precautions: Fall precautions       Pain  Pain Assessment: 0-10  0-10 (Numeric) Pain Score: 3  Pain Type: Surgical pain  Pain Location: Knee  Pain Orientation: Right  Pain Radiating Towards: distal RLE  Pain Descriptors: Aching, Shooting  Pain Frequency: Constant/continuous  Clinical Progression: Gradually improving       Objective increases and additions as per flow sheet           Treatments:  EXERCISES Date 25 Date3/3/25 Date 3/5/25 3/10/25    REPS REPS REPS REPS          Nustep L4  10 min L4 x10 L4 x 10' L4 x10 min          DLP  SLP  HR  6B 2x15  5B 2x15  5B 2x15 6B 2x20  5B 2x15  5B 2x15 6 B 2x20  5 B 2x20  5B 2x20          HS stretch seated  3x30\"H 3x30\" H 3x30\"H   Rocker board stretch  3x30\"H 3x30 \" H 3x30\"H                               Qhip   Flex  Abduction  Extension     5#   2x10  10# 2x10   5# 2x15  10# 2x15  5# 2x10   Mini squats 2x10 x20     Step-up/over       Lateral step-ups   8in x10 ANUPAM 8in x10 ANUPAM      8\" x10 ANUPAM 8\" x10 ANUPAM   Manual:       Patellar mobs       PROM knee X10 min 10' 10'    AROM -2 >105 0>107 0>112 deg    PROM 0>115 0>115 0>116 0>118 deg   Ice PRN              HEP               Assessment:  PT Assessment  Assessment Comment: Kayleen continues to make good progress toward all goals. She increased ROM this session.       Plan:  OP PT Plan  Treatment/Interventions: Cryotherapy, Education/ Instruction, Gait training, Manual therapy, " Neuromuscular re-education, Therapeutic activities, Therapeutic exercises  PT Plan: Skilled PT  PT Frequency: 2 times per week  Duration: 8 weeks  Onset Date: 02/12/25  Certification Period Start Date: 02/17/25  Certification Period End Date: 05/17/25    Goals:  Active       PT Problem       PT Goal 1       Start:  02/17/25    Expected End:  04/17/25       Pt will be able to teach back 90% of HEP in order to maintain gains made in PT.          PT Goal 2       Start:  02/17/25    Expected End:  04/17/25       Pt will increase LE strength to 4+/5 or greater in order to improve gait and tolerance to functional activities.          PT Goal 3       Start:  02/17/25    Expected End:  04/17/25       Pt will increase R knee ROM to 0-125 degrees in order to reduce pain and improve tolerance to functional mobility.          PT Goal 4       Start:  02/17/25    Expected End:  04/17/25       Pt will decrease pain to 2/10 at worst, with pt able to manage symptoms with HEP, positioning and modalities independently.          PT Goal 5       Start:  02/17/25    Expected End:  04/17/25       Pt will be able to ambulate 300+ft with no AD in order to indicate tolerance to community ambulation with minimal gait deviations.

## 2025-03-11 ENCOUNTER — APPOINTMENT (OUTPATIENT)
Dept: ORTHOPEDIC SURGERY | Facility: CLINIC | Age: 75
End: 2025-03-11
Payer: MEDICARE

## 2025-03-11 VITALS — HEIGHT: 62 IN | WEIGHT: 207 LBS | BODY MASS INDEX: 38.09 KG/M2

## 2025-03-11 DIAGNOSIS — M17.11 PRIMARY OSTEOARTHRITIS OF RIGHT KNEE: Primary | ICD-10-CM

## 2025-03-11 PROCEDURE — 1111F DSCHRG MED/CURRENT MED MERGE: CPT | Performed by: ORTHOPAEDIC SURGERY

## 2025-03-11 PROCEDURE — 1157F ADVNC CARE PLAN IN RCRD: CPT | Performed by: ORTHOPAEDIC SURGERY

## 2025-03-11 PROCEDURE — 1123F ACP DISCUSS/DSCN MKR DOCD: CPT | Performed by: ORTHOPAEDIC SURGERY

## 2025-03-11 PROCEDURE — 1125F AMNT PAIN NOTED PAIN PRSNT: CPT | Performed by: ORTHOPAEDIC SURGERY

## 2025-03-11 PROCEDURE — 99024 POSTOP FOLLOW-UP VISIT: CPT | Performed by: ORTHOPAEDIC SURGERY

## 2025-03-11 PROCEDURE — 1159F MED LIST DOCD IN RCRD: CPT | Performed by: ORTHOPAEDIC SURGERY

## 2025-03-11 PROCEDURE — 1036F TOBACCO NON-USER: CPT | Performed by: ORTHOPAEDIC SURGERY

## 2025-03-11 ASSESSMENT — PAIN SCALES - GENERAL: PAINLEVEL_OUTOF10: 2

## 2025-03-11 ASSESSMENT — PAIN - FUNCTIONAL ASSESSMENT: PAIN_FUNCTIONAL_ASSESSMENT: 0-10

## 2025-03-11 NOTE — PROGRESS NOTES
ORTHOPEDIC TOTAL JOINT  POST-OPERATIVE FOLLOW UP      ============================  IMPRESSION/PLAN:  ============================  75 y.o. female s/p Right Total Knee Replacement completed on 2/12/2025    PLAN:  -Weightbearing: Weight bearing as tolerated  -DVT Prophylaxis: No longer needed  -Radiology Studies: None today  -Therapies: Continue PT/OT  -Transition off assistive device as seen fit by patient and therapy  -Follow-up: 7 weeks with x-rays        Kayleen Rincon presents today for follow up of joint replacement above. Pain controlled with current treatment plan. Pain is worse at night. Patient has begun physical therapy. They are currently doing therapy at [facility]. They are currently ambulating with  no assistance . They had swelling in their leg and feet which has gone down.  Overall, they are doing well. They have no concerns.     Review of Systems:   Constitutional: See HPI for pain assessment, No significant weight loss, recent trauma. Denies fevers/chills  Cardiovascular: No chest pain, shortness of breath  Respiratory: No difficulty breathing, cough  Gastrointestinal: No nausea, vomiting, diarrhea, constipation  Musculoskeletal: Noted in HPI, no arthralgias   Integumentary: No rashes, easy bruising, redness   Neurological: no numbness or tingling in extremities, no gait disturbances     Patient Active Problem List   Diagnosis    Abdominal pain, acute, periumbilical    Acute abdominal pain in left lower quadrant    Anemia    Bilateral edema of lower extremity    Bilateral hearing loss    Breast asymmetry in female    Cat bite of right hand    Contact dermatitis    Cyst of right kidney    Diverticulosis of colon    Dysuria    Hair loss    Hepatic steatosis    History of cervical cancer    Hyperlipemia, mixed    Hypothyroidism    Iron deficiency anemia    Nodule of lower lobe of right lung    Nodule of middle lobe of right lung    Morbid obesity (Multi)    Other microscopic hematuria    Peripheral  arterial disease (CMS-HCC)    Postmenopausal bone loss    Varicose veins of right lower extremity    Ventral hernia    Vitamin D deficiency    Weight gain    Helicobacter pylori gastritis    Routine general medical examination at health care facility    Severe low back pain    Edema of lower extremity    History of elevated lipids    Double thoracic curve idiopathic scoliosis    Right knee injury, sequela    Osteoarthritis of right knee    Right knee pain    Systolic murmur    Delayed emergence from anesthesia    Arrhythmia    Gait abnormality       =================================  EXAM  =================================  GENERAL: A/Ox3, NAD. Appears healthy, well nourished  CARDIAC: regular rate  LUNGS: Breathing non-labored    MUSCULOSKELETAL:  Laterality: right knee  - Incision: No overlying, erythema, induration, or drainage. Incision healing well with no wound dehiscence  - ROM: 5 to 115 degrees  - Palpation: appropriate post operative TTP along surgical incision  - compartments soft, negative homans  - can active straight leg raise with no extensor lag    NEUROVASCULAR:  - Neurovascular Status: sensation intact to light touch distally  - Capillary refill brisk at extremities, Bilateral dorsalis pedis pulse 2+     IMAGING: None today      Lazara Liu, DO  Attending Surgeon  Joint Replacement and Adult Reconstructive Surgery  [place], [place]

## 2025-03-12 ENCOUNTER — TREATMENT (OUTPATIENT)
Dept: PHYSICAL THERAPY | Facility: HOSPITAL | Age: 75
End: 2025-03-12
Payer: MEDICARE

## 2025-03-12 DIAGNOSIS — M17.11 PRIMARY OSTEOARTHRITIS OF RIGHT KNEE: ICD-10-CM

## 2025-03-12 PROCEDURE — 97110 THERAPEUTIC EXERCISES: CPT | Mod: GP | Performed by: PHYSICAL THERAPIST

## 2025-03-12 PROCEDURE — 97140 MANUAL THERAPY 1/> REGIONS: CPT | Mod: GP | Performed by: PHYSICAL THERAPIST

## 2025-03-12 ASSESSMENT — PAIN SCALES - GENERAL: PAINLEVEL_OUTOF10: 2

## 2025-03-12 NOTE — PROGRESS NOTES
"Physical Therapy    Physical Therapy Treatment      Patient Name: Kayleen Rincon \"Reji"  MRN: [id number]  : 1950   Today's Date: 3/12/2025  Visit # 7  Time Calculation  Start Time: 102  Stop Time: 142  Time Calculation (min): 40 min    PT Therapeutic Procedures Time Entry  Manual Therapy Time Entry: 8  Therapeutic Exercise Time Entry: 32             Auth:     Current Problem  Problem List Items Addressed This Visit             ICD-10-CM    Osteoarthritis of right knee M17.11        Subjective    2-3/10   Saw Dr Liu     Precautions       Pain:  Pain Assessment  0-10 (Numeric) Pain Score: 2  Pain Location: Knee  Pain Orientation: Right      Objective         75 y.o. female s/p Right Total Knee Replacement completed on 2025    Amb with no assist device , no falls   Stairs with rail at home reciprocal    AROM Rt TKA  5- 125 supine    MMT  hip flex  Rt 4/5  left 5/5  Knee ext Rt 4/5  left 5/5  Knee flex Rt 4/5  left 5/5    Stairs tx reciprocal with rail    Challenged by SLR 10 reps                     LEFS: 70/80   ( increased from 36/80 )       Treatments:  EXERCISES Date 25 Date3/3/25 Date 3/5/25 3/10/25 3/12/25  recheck     REPS REPS REPS REPS              recheck   Nustep L4  10 min L4 x10 L4 x 10' L4 x10 min                 DLP  SLP  HR   6B 2x15  5B 2x15  5B 2x15 6B 2x20  5B 2x15  5B 2x15 6 B 2x20  5 B 2x20  5B 2x20 NP                HS stretch seated   3x30\"H 3x30\" H 3x30\"H 3x 30\"H   Rocker board stretch   3x30\"H 3x30 \" H 3x30\"H 3x30\"H                                                       Qhip   Flex  Abduction  Extension        5#   2x10  10# 2x10    5# 2x15  10# 2x15  5# 2x10   5# 10 x2   Flex, abd, ext anupam LE   Mini squats 2x10 x20        Step-up/over            Lateral step-ups     8in x10 ANUPAM 8in x10 ANUPAM          8\" x10 ANUPAM 8\" x10 ANUPAM    Manual:         8 min   Patellar mobs         10 x    PROM knee X10 min 10' 10'      AROM -2 >105 0>107 0>112 deg   5- 125   Stairs      8 with reciprocal " 1 rail   PROM 0>115 0>115 0>116 0>118 deg 5-125   Ice PRN                         HEP                       Assessment: progressing towards goals     Plan: continue PT  2 x week  2- 3 weeks to work full knee ext and strengthen quads    Goals:  Active       PT Problem       PT Goal 1 (Progressing)       Start:  02/17/25    Expected End:  03/26/25       Pt will be able to teach back 90% of HEP in order to maintain gains made in PT.  progressing         PT Goal 2 (Progressing)       Start:  02/17/25    Expected End:  04/02/25       Pt will increase LE strength to 4+/5 or greater in order to improve gait and tolerance to functional activities.  progressing         PT Goal 3 (Progressing)       Start:  02/17/25    Expected End:  04/02/25       Pt will increase R knee ROM to 0-125 degrees in order to reduce pain and improve tolerance to functional mobility.  Progressing with knee ext         PT Goal 4 (Progressing)       Start:  02/17/25    Expected End:  04/02/25       Pt will decrease pain to 2/10 at worst, with pt able to manage symptoms with HEP, positioning and modalities independently.  progressing         PT Goal 5 (Met)       Start:  02/17/25    Expected End:  03/12/25    Resolved:  03/12/25    Pt will be able to ambulate 300+ft with no AD in order to indicate tolerance to community ambulation with minimal gait deviations.goal met

## 2025-03-18 ENCOUNTER — TELEPHONE (OUTPATIENT)
Dept: ORTHOPEDIC SURGERY | Facility: HOSPITAL | Age: 75
End: 2025-03-18
Payer: MEDICARE

## 2025-03-18 NOTE — TELEPHONE ENCOUNTER
Patient called in stating she had Right Total Knee Arthroplasty surgery on 2/12/25 and is requesting a refill of oxycodone and tramadol, states she has 4 pills left of each. Please advise.     Pharmacy: [facility] in [place]

## 2025-03-19 ENCOUNTER — DOCUMENTATION (OUTPATIENT)
Dept: PHYSICAL THERAPY | Facility: HOSPITAL | Age: 75
End: 2025-03-19
Payer: MEDICARE

## 2025-03-19 ENCOUNTER — APPOINTMENT (OUTPATIENT)
Dept: PHYSICAL THERAPY | Facility: HOSPITAL | Age: 75
End: 2025-03-19
Payer: MEDICARE

## 2025-03-19 DIAGNOSIS — M17.11 PRIMARY OSTEOARTHRITIS OF RIGHT KNEE: ICD-10-CM

## 2025-03-19 DIAGNOSIS — R26.9 GAIT ABNORMALITY: ICD-10-CM

## 2025-03-19 RX ORDER — OXYCODONE HYDROCHLORIDE 5 MG/1
5 TABLET ORAL EVERY 6 HOURS PRN
Qty: 28 TABLET | Refills: 0 | Status: SHIPPED | OUTPATIENT
Start: 2025-03-19 | End: 2025-03-26

## 2025-03-19 RX ORDER — TRAMADOL HYDROCHLORIDE 50 MG/1
50 TABLET ORAL EVERY 6 HOURS PRN
Qty: 28 TABLET | Refills: 0 | Status: SHIPPED | OUTPATIENT
Start: 2025-03-19 | End: 2025-03-26

## 2025-03-19 NOTE — PROGRESS NOTES
"Physical Therapy                 Therapy Communication Note    Patient Name: Kayleen Rincon \"Reji"  MRN: [id number]  Department:   Room: Room/bed info not found  Today's Date: 3/19/2025     Discipline: Physical Therapy          Missed Visit Reason:      Missed Time: Cancel    Comment: pt cancel via my chart. No reason given.  "

## 2025-03-24 ENCOUNTER — TREATMENT (OUTPATIENT)
Dept: PHYSICAL THERAPY | Facility: HOSPITAL | Age: 75
End: 2025-03-24
Payer: MEDICARE

## 2025-03-24 DIAGNOSIS — R26.9 GAIT ABNORMALITY: ICD-10-CM

## 2025-03-24 DIAGNOSIS — M17.11 PRIMARY OSTEOARTHRITIS OF RIGHT KNEE: ICD-10-CM

## 2025-03-24 PROCEDURE — 97110 THERAPEUTIC EXERCISES: CPT | Mod: GP,CQ | Performed by: PHYSICAL THERAPY ASSISTANT

## 2025-03-24 ASSESSMENT — PAIN DESCRIPTION - DESCRIPTORS: DESCRIPTORS: ACHING

## 2025-03-24 ASSESSMENT — PAIN - FUNCTIONAL ASSESSMENT: PAIN_FUNCTIONAL_ASSESSMENT: 0-10

## 2025-03-24 ASSESSMENT — PAIN SCALES - GENERAL: PAINLEVEL_OUTOF10: 3

## 2025-03-24 NOTE — PROGRESS NOTES
"Physical Therapy    Physical Therapy Treatment    Patient Name: Kayleen Rincon  MRN: [id number]  : 1950  Today's Date: 3/24/2025  Time Calculation  Start Time: 310  Stop Time: 358  Time Calculation (min): 48 min    PT Therapeutic Procedures Time Entry  Manual Therapy Time Entry: 8  Therapeutic Exercise Time Entry: 40          VISIT:# 8    Current Problem  1. Gait abnormality        2. Primary osteoarthritis of right knee  Follow Up In Physical Therapy          Subjective pt reported she is feeling good.        Precautions  Precautions  Precautions Comment: none       Pain  Pain Assessment: 0-10  0-10 (Numeric) Pain Score: 3  Pain Type: Surgical pain  Pain Location: Knee  Pain Orientation: Right  Pain Descriptors: Aching  Pain Frequency: Intermittent  Clinical Progression: Gradually worsening       Objective Changes as per flow sheet.            Treatments:  EXERCISES Date 25 Date3/3/25 Date 3/5/25 3/10/25 3/12/25  recheck 3/24/25     REPS REPS REPS REPS  Reps             recheck    Nustep L4  10 min L4 x10 L4 x 10' L4 x10 min  L5 x 10 min                 DLP  SLP  HR   6B 2x15  5B 2x15  5B 2x15 6B 2x20  5B 2x15  5B 2x15 6 B 2x20  5 B 2x20  5B 2x20 NP                  HS stretch seated   3x30\"H 3x30\" H 3x30\"H 3x 30\"H 3x30\" H   Rocker board stretch   3x30\"H 3x30 \" H 3x30\"H 3x30\"H 3x30'H                  quad set on bolster           10x5\" H                               Qhip   Flex  Abduction  Extension        5#   2x10  10# 2x10    5# 2x15  10# 2x15  5# 2x10   5# 10 x2   Flex, abd, ext anupam LE 2x15 20#   Flex, abd, ext and TKE   Mini squats 2x10 x20         Step-up/over             Lateral step-ups     8in x10 ANUPAM 8in x10 ANUPAM  8\" x15 anupam alt         8\" x10 ANUPAM 8\" x10 ANUPAM  8\"x15 ANUPAM   Manual:         8 min 8 min   Patellar mobs         10 x     PROM knee X10 min 10' 10'       AROM -2 >105 0>107 0>112 deg   5- 125 0>125   Stairs      8 with reciprocal 1 rail 15 steps reciprocal with 1 rail    PROM 0>115 " 0>115 0>116 0>118 deg 5-125    Ice PRN                           HEP                  Assessment:  PT Assessment  Assessment Comment: Becky continues to increase her strength and ROM . She will continue to make good progress with PT. pt with no pain at end of session.       Plan:  OP PT Plan  Treatment/Interventions: Cryotherapy, Education/ Instruction, Gait training, Manual therapy, Neuromuscular re-education, Therapeutic activities, Therapeutic exercises  PT Plan: Skilled PT  PT Frequency: 2 times per week  Duration: 8 weeks  Onset Date: 02/12/25  Certification Period Start Date: 02/17/25  Certification Period End Date: 05/17/25    Goals:  Active       PT Problem       PT Goal 1 (Progressing)       Start:  02/17/25    Expected End:  03/26/25       Pt will be able to teach back 90% of HEP in order to maintain gains made in PT.  progressing         PT Goal 2 (Progressing)       Start:  02/17/25    Expected End:  04/02/25       Pt will increase LE strength to 4+/5 or greater in order to improve gait and tolerance to functional activities.  progressing         PT Goal 3 (Progressing)       Start:  02/17/25    Expected End:  04/02/25       Pt will increase R knee ROM to 0-125 degrees in order to reduce pain and improve tolerance to functional mobility.  Progressing with knee ext         PT Goal 4 (Progressing)       Start:  02/17/25    Expected End:  04/02/25       Pt will decrease pain to 2/10 at worst, with pt able to manage symptoms with HEP, positioning and modalities independently.  progressing         PT Goal 5 (Met)       Start:  02/17/25    Expected End:  03/12/25    Resolved:  03/12/25    Pt will be able to ambulate 300+ft with no AD in order to indicate tolerance to community ambulation with minimal gait deviations.goal met

## 2025-03-26 ENCOUNTER — TREATMENT (OUTPATIENT)
Dept: PHYSICAL THERAPY | Facility: HOSPITAL | Age: 75
End: 2025-03-26
Payer: MEDICARE

## 2025-03-26 DIAGNOSIS — M17.11 PRIMARY OSTEOARTHRITIS OF RIGHT KNEE: Primary | ICD-10-CM

## 2025-03-26 DIAGNOSIS — R26.9 GAIT ABNORMALITY: ICD-10-CM

## 2025-03-26 PROCEDURE — 97110 THERAPEUTIC EXERCISES: CPT | Mod: GP

## 2025-03-26 PROCEDURE — 97140 MANUAL THERAPY 1/> REGIONS: CPT | Mod: GP

## 2025-03-26 ASSESSMENT — PAIN DESCRIPTION - DESCRIPTORS: DESCRIPTORS: ACHING

## 2025-03-26 ASSESSMENT — PAIN - FUNCTIONAL ASSESSMENT: PAIN_FUNCTIONAL_ASSESSMENT: 0-10

## 2025-03-26 ASSESSMENT — PAIN SCALES - GENERAL: PAINLEVEL_OUTOF10: 2

## 2025-03-26 NOTE — PROGRESS NOTES
"Physical Therapy    Physical Therapy Treatment    Patient Name: Kayleen Rincon  MRN: [id number]  Today's Date: 3/26/2025  Time Calculation  Start Time: 1300  Stop Time: 1344  Time Calculation (min): 44 min  PT Therapeutic Procedures Time Entry  Manual Therapy Time Entry: 8  Therapeutic Exercise Time Entry: 36  Auth Visit Dates: 2/17/25-2/17/26  VISIT# 9    Current Problem  Problem List Items Addressed This Visit             ICD-10-CM    Osteoarthritis of right knee - Primary M17.11    Gait abnormality R26.9     Subjective   Pt declines falls since last session. Pt reports compliance with HEP \"somewhat\". Pt reports after sitting for ~1 hour she feels stiff and has to get up and walk around.    Precautions  Precautions  Medical Precautions: Fall precautions  Precautions Comment: none    Pain  Pain Assessment: 0-10  0-10 (Numeric) Pain Score: 2  Pain Type: Surgical pain  Pain Location: Knee  Pain Orientation: Right  Pain Descriptors: Aching  Pain Frequency: Intermittent    Objective   Initiation of SLS    Treatments:  EXERCISES Date 2/26/25 Date3/3/25 Date 3/5/25 3/10/25 3/12/25  recheck 3/24/25 3/26/25     REPS REPS REPS REPS  Reps Reps             recheck     Nustep L4  10 min L4 x10 L4 x 10' L4 x10 min  L5 x 10 min L5 10 min no UE's                  DLP  SLP  HR   6B 2x15  5B 2x15  5B 2x15 6B 2x20  5B 2x15  5B 2x15 6 B 2x20  5 B 2x20  5B 2x20 NP                    HS stretch seated   3x30\"H 3x30\" H 3x30\"H 3x 30\"H 3x30\" H 3x30\" H   Rocker board stretch   3x30\"H 3x30 \" H 3x30\"H 3x30\"H 3x30'H 3x30\" H                   quad set on bolster           10x5\" H              SLS           Single UE support 3x30 sec                  Qhip   Flex  Abduction  Extension        5#   2x10  10# 2x10    5# 2x15  10# 2x15  5# 2x10   5# 10 x2   Flex, abd, ext anupam LE 2x15 20#   Flex, abd, ext and TKE 20# 2x15 ea and TKE   Mini squats 2x10 x20          Step-up/over              Lateral step-ups     8in x10 ANUPAM 8in x10 ANUPAM  8\" x15 anupam alt  " "        8\" x10 ANUPAM 8\" x10 ANUPAM  8\"x15 ANUPAM    Manual:         8 min 8 min 8 min   Patellar mobs         10 x      PROM knee X10 min 10' 10'        AROM -2 >105 0>107 0>112 deg   5- 125 0>125    Stairs      8 with reciprocal 1 rail 15 steps reciprocal with 1 rail     PROM 0>115 0>115 0>116 0>118 deg 5-125  0-125   Ice PRN                             HEP                Assessment:  Pt tolerates treatment session well reporting 2/10 pain at end of session. Pt requiring minimal verbal cues for body mechanics throughout session. Pt with AROM 0-110 degrees this date noting increases in pain with AROM. Pt able to achieve 0-125 degrees PROM without increases in pain. Treatment session provided by JUAN R Armstrong. The supervising therapist, Mckayla Andrews, PT, DPT, was present throughout session and made all clinical decisions.    OP EDUCATION:  Outpatient Education  Individual(s) Educated: Patient  Education Provided: Anatomy, Body Mechanics, Physiology  Risk and Benefits Discussed with Patient/Caregiver/Other: yes  Plan of Care Discussed and Agreed Upon: yes  Patient Response to Education: Patient/Caregiver Verbalized Understanding of Information, Patient/Caregiver Asked Appropriate Questions  Education Comment: Pt education provided for the following: Mechanics throughout session, continuation of HEP, utilization of ice to help decrease swelling. Pt demonstrates good understanding this date.    Plan:  Continue to work towards full knee ext and strengthen quads  OP PT Plan  Treatment/Interventions: Cryotherapy, Education/ Instruction, Gait training, Manual therapy, Neuromuscular re-education, Therapeutic activities, Therapeutic exercises  PT Plan: Skilled PT  PT Frequency: 2 times per week  Duration: 8 weeks  Onset Date: 02/12/25  Certification Period Start Date: 02/17/25  Certification Period End Date: 05/17/25  Rehab Potential: Good  Plan of Care Agreement: Patient    Goals:  Active       PT Problem       PT Goal 1 " (Progressing)       Start:  02/17/25    Expected End:  03/26/25       Pt will be able to teach back 90% of HEP in order to maintain gains made in PT.  progressing         PT Goal 2 (Progressing)       Start:  02/17/25    Expected End:  04/02/25       Pt will increase LE strength to 4+/5 or greater in order to improve gait and tolerance to functional activities.  progressing         PT Goal 3 (Progressing)       Start:  02/17/25    Expected End:  04/02/25       Pt will increase R knee ROM to 0-125 degrees in order to reduce pain and improve tolerance to functional mobility.  Progressing with knee ext         PT Goal 4 (Progressing)       Start:  02/17/25    Expected End:  04/02/25       Pt will decrease pain to 2/10 at worst, with pt able to manage symptoms with HEP, positioning and modalities independently.  progressing         PT Goal 5 (Met)       Start:  02/17/25    Expected End:  03/12/25    Resolved:  03/12/25    Pt will be able to ambulate 300+ft with no AD in order to indicate tolerance to community ambulation with minimal gait deviations.goal met

## 2025-03-31 ENCOUNTER — TREATMENT (OUTPATIENT)
Dept: PHYSICAL THERAPY | Facility: HOSPITAL | Age: 75
End: 2025-03-31
Payer: MEDICARE

## 2025-03-31 DIAGNOSIS — R26.9 GAIT ABNORMALITY: ICD-10-CM

## 2025-03-31 DIAGNOSIS — M17.11 PRIMARY OSTEOARTHRITIS OF RIGHT KNEE: ICD-10-CM

## 2025-03-31 PROCEDURE — 97110 THERAPEUTIC EXERCISES: CPT | Mod: GP,CQ | Performed by: PHYSICAL THERAPY ASSISTANT

## 2025-03-31 PROCEDURE — 97140 MANUAL THERAPY 1/> REGIONS: CPT | Mod: GP,CQ | Performed by: PHYSICAL THERAPY ASSISTANT

## 2025-03-31 ASSESSMENT — PAIN SCALES - GENERAL: PAINLEVEL_OUTOF10: 1

## 2025-03-31 ASSESSMENT — PAIN - FUNCTIONAL ASSESSMENT: PAIN_FUNCTIONAL_ASSESSMENT: 0-10

## 2025-03-31 ASSESSMENT — PAIN DESCRIPTION - DESCRIPTORS: DESCRIPTORS: ACHING

## 2025-03-31 NOTE — PROGRESS NOTES
"Physical Therapy    Physical Therapy Treatment    Patient Name: Kayleen Rincon  MRN: [id number]  : 1950  Today's Date: 3/31/2025  Time Calculation  Start Time: 1300  Stop Time: 1354  Time Calculation (min): 54 min    PT Therapeutic Procedures Time Entry  Manual Therapy Time Entry: 8  Therapeutic Exercise Time Entry: 46          VISIT:# 10    Current Problem  1. Gait abnormality        2. Primary osteoarthritis of right knee  Follow Up In Physical Therapy          Subjective Becky reported she has been sleeping in bed. She is feeling better and more mobile.       Precautions  Precautions  Medical Precautions: Fall precautions  Precautions Comment: none       Pain  Pain Assessment: 0-10  0-10 (Numeric) Pain Score: 1 (pt took over the counter meds this am.)  Pain Type: Surgical pain  Pain Location: Knee  Pain Orientation: Right  Pain Descriptors: Aching  Pain Frequency: Intermittent       Objective increases as per flow sheet.            Treatments:  EXERCISES Date 25 Date3/3/25 Date 3/5/25 3/10/25 3/12/25  recheck 3/24/25 3/26/25 3/31/25     REPS REPS REPS REPS  Reps Reps Reps             recheck      Nustep L4  10 min L4 x10 L4 x 10' L4 x10 min  L5 x 10 min L5 10 min no UE's L5 x10 min no UE                   DLP  SLP  HR   6B 2x15  5B 2x15  5B 2x15 6B 2x20  5B 2x15  5B 2x15 6 B 2x20  5 B 2x20  5B 2x20 NP   7B 2x20  6B 2x20  6B 2x20                   HS stretch seated   3x30\"H 3x30\" H 3x30\"H 3x 30\"H 3x30\" H 3x30\" H 3x30\"H   Rocker board stretch   3x30\"H 3x30 \" H 3x30\"H 3x30\"H 3x30'H 3x30\" H 3x30\"H                    quad set on bolster           10x5\" H                SLS           Single UE support 3x30 sec                    Qhip   Flex  Abduction  Extension  TKE        5#   2x10  10# 2x10    5# 2x15  10# 2x15  5# 2x10   5# 10 x2   Flex, abd, ext amando LE 2x15 20#   Flex, abd, ext and TKE 20# 2x15 ea and TKE 20# 2x15 ea Amando   Mini squats 2x10 x20           Step-up/over               Lateral step-ups     8in " "x10 ANUPAM 8in x10 ANUPAM  8\" x15 anupam alt  8\" x20 anupam alt         8\" x10 ANUPAM 8\" x10 ANUPAM  8\"x15 ANUPAM  8\"x20 ANUPAM   Manual:         8 min 8 min 8 min 8 min   Patellar mobs         10 x       PROM knee X10 min 10' 10'         AROM -2 >105 0>107 0>112 deg   5- 125 0>125  0>125   Stairs      8 with reciprocal 1 rail 15 steps reciprocal with 1 rail      PROM 0>115 0>115 0>116 0>118 deg 5-125  0-125    Ice PRN                               HEP                   Assessment:  PT Assessment  Assessment Comment: Becky ids demonstrating good progress toward all goals. Her ROM and strength continue to improve. No c/o pain at end of session.       Plan:  OP PT Plan  Treatment/Interventions: Cryotherapy, Education/ Instruction, Gait training, Manual therapy, Neuromuscular re-education, Therapeutic activities, Therapeutic exercises  PT Plan: Skilled PT  PT Frequency: 2 times per week  Duration: 8 weeks  Onset Date: 02/12/25  Certification Period Start Date: 02/17/25  Certification Period End Date: 05/17/25    Goals:  Active       PT Problem       PT Goal 1 (Progressing)       Start:  02/17/25    Expected End:  03/26/25       Pt will be able to teach back 90% of HEP in order to maintain gains made in PT.  progressing         PT Goal 2 (Progressing)       Start:  02/17/25    Expected End:  04/02/25       Pt will increase LE strength to 4+/5 or greater in order to improve gait and tolerance to functional activities.  progressing         PT Goal 3 (Progressing)       Start:  02/17/25    Expected End:  04/02/25       Pt will increase R knee ROM to 0-125 degrees in order to reduce pain and improve tolerance to functional mobility.  Progressing with knee ext         PT Goal 4 (Progressing)       Start:  02/17/25    Expected End:  04/02/25       Pt will decrease pain to 2/10 at worst, with pt able to manage symptoms with HEP, positioning and modalities independently.  progressing         PT Goal 5 (Met)       Start:  02/17/25    Expected End:  " 03/12/25    Resolved:  03/12/25    Pt will be able to ambulate 300+ft with no AD in order to indicate tolerance to community ambulation with minimal gait deviations.goal met

## 2025-04-02 ENCOUNTER — TREATMENT (OUTPATIENT)
Dept: PHYSICAL THERAPY | Facility: HOSPITAL | Age: 75
End: 2025-04-02
Payer: MEDICARE

## 2025-04-02 DIAGNOSIS — R26.9 GAIT ABNORMALITY: ICD-10-CM

## 2025-04-02 DIAGNOSIS — M17.11 PRIMARY OSTEOARTHRITIS OF RIGHT KNEE: ICD-10-CM

## 2025-04-02 PROCEDURE — 97116 GAIT TRAINING THERAPY: CPT | Mod: GP,CQ | Performed by: PHYSICAL THERAPY ASSISTANT

## 2025-04-02 PROCEDURE — 97110 THERAPEUTIC EXERCISES: CPT | Mod: GP,CQ | Performed by: PHYSICAL THERAPY ASSISTANT

## 2025-04-02 ASSESSMENT — PAIN - FUNCTIONAL ASSESSMENT: PAIN_FUNCTIONAL_ASSESSMENT: 0-10

## 2025-04-02 ASSESSMENT — PAIN DESCRIPTION - DESCRIPTORS: DESCRIPTORS: ACHING

## 2025-04-02 ASSESSMENT — PAIN SCALES - GENERAL: PAINLEVEL_OUTOF10: 3

## 2025-04-02 NOTE — PROGRESS NOTES
"Physical Therapy    Physical Therapy Treatment    Patient Name: Kayleen Rincon  MRN: [id number]  : 1950  Today's Date: 2025  Time Calculation  Start Time: 1300  Stop Time: 1345  Time Calculation (min): 45 min    PT Therapeutic Procedures Time Entry  Therapeutic Exercise Time Entry: 35  Gait Training Time Entry: 10          VISIT:# 11    Current Problem  1. Gait abnormality        2. Primary osteoarthritis of right knee  Follow Up In Physical Therapy          Subjective Rosy reported she has been able to walk further and is walking for exercise.        Precautions  Precautions  Medical Precautions: Fall precautions  Precautions Comment: none       Pain  Pain Assessment: 0-10  0-10 (Numeric) Pain Score: 3  Pain Type: Surgical pain  Pain Location: Knee  Pain Orientation: Right  Pain Descriptors: Aching  Pain Frequency: Intermittent       Objective   Other Measures  6 min Walk Test: 818.7 ft   Treatments:  EXERCISES 3/10/25 3/12/25  recheck 3/24/25 3/26/25 3/31/25 4/2/25     REPS  Reps Reps Reps Reps       recheck       Nustep L4 x10 min  L5 x 10 min L5 10 min no UE's L5 x10 min no UE L5 x10' with no UE              DLP  SLP  HR 6 B 2x20  5 B 2x20  5B 2x20 NP   7B 2x20  6B 2x20  6B 2x20 &B 2x20  6B 2x20  6B 2x20              HS stretch seated 3x30\"H 3x 30\"H 3x30\" H 3x30\" H 3x30\"H 3x30\"H   Rocker board stretch 3x30\"H 3x30\"H 3x30'H 3x30\" H 3x30\"H 3x30\"H               quad set on bolster     10x5\" H   HEP            SLS     Single UE support 3x30 sec                Qhip   Flex  Abduction  Extension  TKE    5# 2x15  10# 2x15  5# 2x10   5# 10 x2   Flex, abd, ext anupam LE 2x15 20#   Flex, abd, ext and TKE 20# 2x15 ea and TKE 20# 2x15 ea Anupam NP this date   Mini squats          Step-up/over          Lateral step-ups 8in x10 ANUPAM  8\" x15 anupam alt  8\" x20 anupam alt      8\" x10 ANUPAM  8\"x15 ANUPAM  8\"x20 ANUPAM    Manual:   8 min 8 min 8 min 8 min    Patellar mobs   10 x        PROM knee          AROM   5- 125 0>125  0>125    Stairs   " 8 with reciprocal 1 rail 15 steps reciprocal with 1 rail    15 steps x 2 with S and reciprocal gait pattern   PROM 0>118 deg 5-125  0-125     Ice PRN           6 min walk test       818.7 ft            HEP              Assessment:  PT Assessment  Assessment Comment: Becky performed 6 min walk test this session 818.7 feet on multiple surfaces.pt is demonstrating good progress toward goals. PROM not performed secondary to pt having good ROM.       Plan:  OP PT Plan  Treatment/Interventions: Cryotherapy, Education/ Instruction, Gait training, Manual therapy, Neuromuscular re-education, Therapeutic activities, Therapeutic exercises  PT Plan: Skilled PT  PT Frequency: 2 times per week  Duration: 8 weeks  Onset Date: 02/12/25  Certification Period Start Date: 02/17/25  Certification Period End Date: 05/17/25    Goals:  Active       PT Problem       PT Goal 1 (Progressing)       Start:  02/17/25    Expected End:  03/26/25       Pt will be able to teach back 90% of HEP in order to maintain gains made in PT.  progressing         PT Goal 2 (Progressing)       Start:  02/17/25    Expected End:  04/02/25       Pt will increase LE strength to 4+/5 or greater in order to improve gait and tolerance to functional activities.  progressing         PT Goal 3 (Progressing)       Start:  02/17/25    Expected End:  04/02/25       Pt will increase R knee ROM to 0-125 degrees in order to reduce pain and improve tolerance to functional mobility.  Progressing with knee ext         PT Goal 4 (Progressing)       Start:  02/17/25    Expected End:  04/02/25       Pt will decrease pain to 2/10 at worst, with pt able to manage symptoms with HEP, positioning and modalities independently.  progressing         PT Goal 5 (Met)       Start:  02/17/25    Expected End:  03/12/25    Resolved:  03/12/25    Pt will be able to ambulate 300+ft with no AD in order to indicate tolerance to community ambulation with minimal gait deviations.goal met

## 2025-04-07 ENCOUNTER — TREATMENT (OUTPATIENT)
Dept: PHYSICAL THERAPY | Facility: HOSPITAL | Age: 75
End: 2025-04-07
Payer: MEDICARE

## 2025-04-07 DIAGNOSIS — M17.11 PRIMARY OSTEOARTHRITIS OF RIGHT KNEE: ICD-10-CM

## 2025-04-07 DIAGNOSIS — R26.9 GAIT ABNORMALITY: ICD-10-CM

## 2025-04-07 PROCEDURE — 97110 THERAPEUTIC EXERCISES: CPT | Mod: GP,CQ | Performed by: PHYSICAL THERAPY ASSISTANT

## 2025-04-07 ASSESSMENT — PAIN DESCRIPTION - DESCRIPTORS: DESCRIPTORS: ACHING

## 2025-04-07 ASSESSMENT — PAIN - FUNCTIONAL ASSESSMENT: PAIN_FUNCTIONAL_ASSESSMENT: 0-10

## 2025-04-07 NOTE — PROGRESS NOTES
"Physical Therapy    Physical Therapy Treatment    Patient Name: Kayleen Rincon  MRN: [id number]  : 1950  Today's Date: 2025  Time Calculation  Start Time: 1300  Stop Time: 1344  Time Calculation (min): 44 min    PT Therapeutic Procedures Time Entry  Therapeutic Exercise Time Entry: 44          VISIT:# 12    Current Problem  1. Gait abnormality        2. Primary osteoarthritis of right knee  Follow Up In Physical Therapy          Subjective Becky reported she feels like she is doing really well. Feels she is ready for dc after next visit.        Precautions  Precautions  Medical Precautions: Fall precautions  Precautions Comment: none       Pain  Pain Assessment: 0-10  Pain Type: Surgical pain  Pain Location: Knee  Pain Orientation: Right  Pain Descriptors: Aching  Pain Frequency: Intermittent       Objective exercises as per flow sheet.            Treatments:  EXERCISES 3/24/25 3/26/25 3/31/25 4/2/25 4/7/25     Reps Reps Reps Reps Reps            Nustep L5 x 10 min L5 10 min no UE's L5 x10 min no UE L5 x10' with no UE L5 x 10 min with no UE            DLP  SLP  HR   7B 2x20  6B 2x20  6B 2x20 &B 2x20  6B 2x20  6B 2x20 NP do next            HS stretch seated 3x30\" H 3x30\" H 3x30\"H 3x30\"H 3x30\"H on step   Rocker board stretch 3x30'H 3x30\" H 3x30\"H 3x30\"H 3x30\" H on step             quad set on bolster  10x5\" H   HEP HEP           SLS  Single UE support 3x30 sec               Qhip   Flex  Abduction  Extension  TKE 2x15 20#   Flex, abd, ext and TKE 20# 2x15 ea and TKE 20# 2x15 ea Anupam NP this date 20# 2x15  Ea ANUPAM   Mini squats     X15 ea   Step-up/over        Lateral step-ups 8\" x15 anupam alt  8\" x20 anupam alt  8\" x20 Anupam  alt     8\"x15 ANUPAM  8\"x20 ANUPAM  8\"x20 Anupam   Manual: 8 min 8 min 8 min     Patellar mobs        PROM knee        AROM 0>125  0>125     Stairs  15 steps reciprocal with 1 rail    15 steps x 2 with S and reciprocal gait pattern    PROM  0-125      Ice PRN         6 min walk test    818.7 ft          "   HEP            Assessment:  PT Assessment  Assessment Comment: Becky is making good progress toward goals set  by PT. pt has recheck next visit.       Plan:  OP PT Plan  Treatment/Interventions: Cryotherapy, Education/ Instruction, Gait training, Manual therapy, Neuromuscular re-education, Therapeutic activities, Therapeutic exercises  PT Plan: Skilled PT  PT Frequency: 2 times per week  Duration: 8 weeks  Onset Date: 02/12/25  Certification Period Start Date: 02/17/25  Certification Period End Date: 05/17/25    Goals:  Active       PT Problem       PT Goal 1 (Progressing)       Start:  02/17/25    Expected End:  03/26/25       Pt will be able to teach back 90% of HEP in order to maintain gains made in PT.  progressing         PT Goal 2 (Progressing)       Start:  02/17/25    Expected End:  04/02/25       Pt will increase LE strength to 4+/5 or greater in order to improve gait and tolerance to functional activities.  progressing         PT Goal 3 (Progressing)       Start:  02/17/25    Expected End:  04/02/25       Pt will increase R knee ROM to 0-125 degrees in order to reduce pain and improve tolerance to functional mobility.  Progressing with knee ext         PT Goal 4 (Progressing)       Start:  02/17/25    Expected End:  04/02/25       Pt will decrease pain to 2/10 at worst, with pt able to manage symptoms with HEP, positioning and modalities independently.  progressing         PT Goal 5 (Met)       Start:  02/17/25    Expected End:  03/12/25    Resolved:  03/12/25    Pt will be able to ambulate 300+ft with no AD in order to indicate tolerance to community ambulation with minimal gait deviations.goal met

## 2025-04-09 ENCOUNTER — TREATMENT (OUTPATIENT)
Dept: PHYSICAL THERAPY | Facility: HOSPITAL | Age: 75
End: 2025-04-09
Payer: MEDICARE

## 2025-04-09 DIAGNOSIS — R26.9 GAIT ABNORMALITY: ICD-10-CM

## 2025-04-09 DIAGNOSIS — M17.11 PRIMARY OSTEOARTHRITIS OF RIGHT KNEE: ICD-10-CM

## 2025-04-09 PROCEDURE — 97110 THERAPEUTIC EXERCISES: CPT | Mod: GP | Performed by: PHYSICAL THERAPIST

## 2025-04-09 ASSESSMENT — PAIN DESCRIPTION - DESCRIPTORS: DESCRIPTORS: ACHING

## 2025-04-09 ASSESSMENT — PAIN - FUNCTIONAL ASSESSMENT: PAIN_FUNCTIONAL_ASSESSMENT: 0-10

## 2025-04-09 ASSESSMENT — PAIN SCALES - GENERAL: PAINLEVEL_OUTOF10: 2

## 2025-04-09 NOTE — PROGRESS NOTES
"Physical Therapy    Physical Therapy Treatment and Discharge     Patient Name: Kayleen Rincon \"Becky\"  MRN: [id number]  : 1950   Today's Date: 2025  Visit # 13  Time Calculation  Start Time: 0100  Stop Time: 0120  Time Calculation (min): 20 min    PT Therapeutic Procedures Time Entry  Therapeutic Exercise Time Entry: 20  Discharge PT date 25             Auth:     Current Problem  Problem List Items Addressed This Visit             ICD-10-CM    Osteoarthritis of right knee M17.11    Gait abnormality R26.9        Subjective    Becky reported she feels like she is doing really well. Feels she is ready for dc   She does not feel well with her appetite and she is going to see MD re loss of appetite  Pt wants to do short visit / recheck for Discharge  Knee pain -3/10    Precautions  Precautions  Medical Precautions: Fall precautions  Precautions Comment: none    Pain:  Pain Assessment  Pain Assessment: 0-10  0-10 (Numeric) Pain Score: 2  Pain Type: Surgical pain  Pain Location: Knee  Pain Orientation: Right  Pain Descriptors: Aching  Pain Frequency: Intermittent      Objective         75 y.o. female s/p Right Total Knee Replacement completed on 2025     Amb with no assist device , no falls   Stairs with rail at home reciprocal     AROM Rt TKA  5- 125 supine   AAROM 5-130     MMT  hip flex  Rt 5/5  left 5/5  Knee ext Rt 5/5  left 5/5  Knee flex Rt 5/5  left 5/5     Stairs tx reciprocal with rail    LEFS: 70/80   ( increased from 36/80          Treatments:  EXERCISES 3/31/25 4/2/25 4/7/25 4/9/25     Reps Reps Reps            Recheck for DC    Nustep L5 x10 min no UE L5 x10' with no UE L5 x 10 min with no UE Recheck, pt declined doing other ex today, here for recheck only  Per  pt              DLP  SLP  HR 7B 2x20  6B 2x20  6B 2x20 &B 2x20  6B 2x20  6B 2x20 NP do next               HS stretch seated 3x30\"H 3x30\"H 3x30\"H on step    Rocker board stretch 3x30\"H 3x30\"H 3x30\" H on step                quad set " "on bolster    HEP HEP               SLS                     Qhip   Flex  Abduction  Extension  TKE 20# 2x15 ea Amando NP this date 20# 2x15  Ea AMANDO    Mini squats     X15 ea    Step-up/over          Lateral step-ups 8\" x20 amando alt   8\" x20 Amando  alt      8\"x20 AMANDO   8\"x20 Amando    Manual: 8 min        Patellar mobs          PROM knee          AROM 0>125        Stairs    15 steps x 2 with S and reciprocal gait pattern      PROM          Ice PRN           6 min walk test   818.7 ft                 HEP       independent              Assessment: pt feels ready to self manage, here today for recheck and Discharge   Recommend she continue to stretch hamstring and strengthen quads and keep up with her home exercises for best outcomes 1 year post op     Plan: Discharge from PT    Goals:  Active       PT Problem       PT Goal 1 (Met)       Start:  02/17/25    Expected End:  04/09/25    Resolved:  04/09/25    Pt will be able to teach back 90% of HEP in order to maintain gains made in PT.  Goal met         PT Goal 2 (Met)       Start:  02/17/25    Expected End:  04/09/25    Resolved:  04/09/25    Pt will increase LE strength to 4+/5 or greater in order to improve gait and tolerance to functional activities.  Goal met           PT Goal 3 (Progressing)       Start:  02/17/25    Expected End:  04/09/25       Pt will increase R knee ROM to 0-125 degrees in order to reduce pain and improve tolerance to functional mobility.  Progressing with knee ext, met with flexion. Knees symmetrical with extension         PT Goal 4 (Met)       Start:  02/17/25    Expected End:  04/09/25    Resolved:  04/09/25    Pt will decrease pain to 2-3/10 at worst, with pt able to manage symptoms with HEP, positioning and modalities independently.  Goal met         PT Goal 5 (Met)       Start:  02/17/25    Expected End:  03/12/25    Resolved:  03/12/25    Pt will be able to ambulate 300+ft with no AD in order to indicate tolerance to community ambulation with " minimal gait deviations.goal met

## 2025-04-22 ENCOUNTER — APPOINTMENT (OUTPATIENT)
Dept: ORTHOPEDIC SURGERY | Facility: CLINIC | Age: 75
End: 2025-04-22
Payer: MEDICARE

## 2025-04-22 ENCOUNTER — HOSPITAL ENCOUNTER (OUTPATIENT)
Dept: RADIOLOGY | Facility: CLINIC | Age: 75
Discharge: HOME | End: 2025-04-22
Payer: MEDICARE

## 2025-04-22 VITALS — HEIGHT: 62 IN | BODY MASS INDEX: 38.09 KG/M2 | WEIGHT: 207 LBS

## 2025-04-22 DIAGNOSIS — M17.11 PRIMARY OSTEOARTHRITIS OF RIGHT KNEE: ICD-10-CM

## 2025-04-22 PROCEDURE — 1036F TOBACCO NON-USER: CPT | Performed by: ORTHOPAEDIC SURGERY

## 2025-04-22 PROCEDURE — 99024 POSTOP FOLLOW-UP VISIT: CPT | Performed by: ORTHOPAEDIC SURGERY

## 2025-04-22 PROCEDURE — 73560 X-RAY EXAM OF KNEE 1 OR 2: CPT | Mod: RT

## 2025-04-22 PROCEDURE — 1123F ACP DISCUSS/DSCN MKR DOCD: CPT | Performed by: ORTHOPAEDIC SURGERY

## 2025-04-22 PROCEDURE — 1159F MED LIST DOCD IN RCRD: CPT | Performed by: ORTHOPAEDIC SURGERY

## 2025-04-22 PROCEDURE — 1157F ADVNC CARE PLAN IN RCRD: CPT | Performed by: ORTHOPAEDIC SURGERY

## 2025-04-22 PROCEDURE — 73560 X-RAY EXAM OF KNEE 1 OR 2: CPT | Mod: RIGHT SIDE | Performed by: RADIOLOGY

## 2025-04-22 ASSESSMENT — PAIN - FUNCTIONAL ASSESSMENT: PAIN_FUNCTIONAL_ASSESSMENT: NO/DENIES PAIN

## 2025-04-22 NOTE — PROGRESS NOTES
POST-OPERATIVE FOLLOW UP      ============================  IMPRESSION/PLAN:  ============================  75 y.o. female s/p PO R TKA completed on 2025    PLAN:  -Weightbearing: Weight bearing as tolerated  -DVT Prophylaxis: No longer needed  -Radiology Studies: Reviewed today  -Therapies: Continue regular exercise program f  - Follow-up at the 1 year [facility] of surgery with x-rays      Kayleen Rincon presents today for follow up of the above operation. Pain controlled with current treatment plan. Patient has completed physical therapy last week at [facility]. They are currently ambulating with  no aids .     Patient has no concerns or complaints. Patient is overall doing well.  Review of Systems:   Constitutional: See HPI for pain assessment, No significant weight loss, recent trauma. Denies fevers/chills  Cardiovascular: No chest pain, shortness of breath  Respiratory: No difficulty breathing, cough  Gastrointestinal: No nausea, vomiting, diarrhea, constipation  Musculoskeletal: Noted in HPI, no arthralgias   Integumentary: No rashes, easy bruising, redness   Neurological: no numbness or tingling in extremities, no gait disturbances     Problem List[1]    =================================  EXAM  =================================  GENERAL: A/Ox3, NAD. Appears healthy, well nourished  CARDIAC: regular rate  LUNGS: Breathing non-labored     MUSCULOSKELETAL:  Laterality: right knee  - Incision: No overlying, erythema, induration, or drainage. Incision healing well with no wound dehiscence  - ROM: 0 to 120 degrees  - Palpation: appropriate post operative TTP along surgical incision  - compartments soft, negative homans  - can active straight leg raise with no extensor lag     NEUROVASCULAR:  - Neurovascular Status: sensation intact to light touch distally  - Capillary refill brisk at extremities, Bilateral dorsalis pedis pulse 2+     Imagin views right knee demonstrate no signs of loosening or failure of right  total knee arthroplasty. X-rays were personally reviewed by me.  Radiology reports were reviewed by me as well, if available at the time.        Lazara Liu DO  Attending Surgeon  Joint Replacement and Adult Reconstructive Surgery  [place], [place]            [1]   Patient Active Problem List  Diagnosis    Abdominal pain, acute, periumbilical    Acute abdominal pain in left lower quadrant    Anemia    Bilateral edema of lower extremity    Bilateral hearing loss    Breast asymmetry in female    Cat bite of right hand    Contact dermatitis    Cyst of right kidney    Diverticulosis of colon    Dysuria    Hair loss    Hepatic steatosis    History of cervical cancer    Hyperlipemia, mixed    Hypothyroidism    Iron deficiency anemia    Nodule of lower lobe of right lung    Nodule of middle lobe of right lung    Morbid obesity (Multi)    Other microscopic hematuria    Peripheral arterial disease (CMS-HCC)    Postmenopausal bone loss    Varicose veins of right lower extremity    Ventral hernia    Vitamin D deficiency    Weight gain    Helicobacter pylori gastritis    Routine general medical examination at health care facility    Severe low back pain    Edema of lower extremity    History of elevated lipids    Double thoracic curve idiopathic scoliosis    Right knee injury, sequela    Osteoarthritis of right knee    Right knee pain    Systolic murmur    Delayed emergence from anesthesia    Arrhythmia    Gait abnormality

## 2025-06-03 DIAGNOSIS — E55.9 VITAMIN D DEFICIENCY: ICD-10-CM

## 2025-06-03 DIAGNOSIS — D50.9 IRON DEFICIENCY ANEMIA, UNSPECIFIED IRON DEFICIENCY ANEMIA TYPE: ICD-10-CM

## 2025-06-03 RX ORDER — ERGOCALCIFEROL 1.25 MG/1
1.25 CAPSULE ORAL
Qty: 12 CAPSULE | Refills: 0 | Status: SHIPPED | OUTPATIENT
Start: 2025-06-03

## 2025-06-03 RX ORDER — FERROUS SULFATE 325(65) MG
TABLET ORAL
Qty: 90 TABLET | Refills: 1 | Status: SHIPPED | OUTPATIENT
Start: 2025-06-03

## 2025-06-03 RX ORDER — ASPIRIN 81 MG/1
1 TABLET ORAL
COMMUNITY
Start: 2025-03-10

## 2025-06-10 LAB
25(OH)D3+25(OH)D2 SERPL-MCNC: 74 NG/ML (ref 30–100)
ALBUMIN SERPL-MCNC: 4.5 G/DL (ref 3.6–5.1)
ALBUMIN/CREAT UR: ABNORMAL MG/G CREAT
ALP SERPL-CCNC: 101 U/L (ref 37–153)
ALT SERPL-CCNC: 14 U/L (ref 6–29)
ANION GAP SERPL CALCULATED.4IONS-SCNC: 9 MMOL/L (CALC) (ref 7–17)
AST SERPL-CCNC: 15 U/L (ref 10–35)
BASOPHILS # BLD AUTO: 66 CELLS/UL (ref 0–200)
BASOPHILS NFR BLD AUTO: 0.8 %
BILIRUB SERPL-MCNC: 0.7 MG/DL (ref 0.2–1.2)
BUN SERPL-MCNC: 14 MG/DL (ref 7–25)
CALCIUM SERPL-MCNC: 9.7 MG/DL (ref 8.6–10.4)
CHLORIDE SERPL-SCNC: 102 MMOL/L (ref 98–110)
CHOLEST SERPL-MCNC: 220 MG/DL
CHOLEST/HDLC SERPL: 4.2 (CALC)
CO2 SERPL-SCNC: 28 MMOL/L (ref 20–32)
CREAT SERPL-MCNC: 0.75 MG/DL (ref 0.6–1)
CREAT UR-MCNC: 13 MG/DL (ref 20–275)
EGFRCR SERPLBLD CKD-EPI 2021: 82 ML/MIN/1.73M2
EOSINOPHIL # BLD AUTO: 149 CELLS/UL (ref 15–500)
EOSINOPHIL NFR BLD AUTO: 1.8 %
ERYTHROCYTE [DISTWIDTH] IN BLOOD BY AUTOMATED COUNT: 12.8 % (ref 11–15)
GLUCOSE SERPL-MCNC: 107 MG/DL (ref 65–99)
HCT VFR BLD AUTO: 44.2 % (ref 35–45)
HDLC SERPL-MCNC: 52 MG/DL
HGB BLD-MCNC: 14.5 G/DL (ref 11.7–15.5)
LDLC SERPL CALC-MCNC: 136 MG/DL (CALC)
LYMPHOCYTES # BLD AUTO: 1992 CELLS/UL (ref 850–3900)
LYMPHOCYTES NFR BLD AUTO: 24 %
MCH RBC QN AUTO: 29.7 PG (ref 27–33)
MCHC RBC AUTO-ENTMCNC: 32.8 G/DL (ref 32–36)
MCV RBC AUTO: 90.6 FL (ref 80–100)
MICROALBUMIN UR-MCNC: <0.2 MG/DL
MONOCYTES # BLD AUTO: 523 CELLS/UL (ref 200–950)
MONOCYTES NFR BLD AUTO: 6.3 %
NEUTROPHILS # BLD AUTO: 5569 CELLS/UL (ref 1500–7800)
NEUTROPHILS NFR BLD AUTO: 67.1 %
NONHDLC SERPL-MCNC: 168 MG/DL (CALC)
PLATELET # BLD AUTO: 348 THOUSAND/UL (ref 140–400)
PMV BLD REES-ECKER: 10.2 FL (ref 7.5–12.5)
POTASSIUM SERPL-SCNC: 4.3 MMOL/L (ref 3.5–5.3)
PROT SERPL-MCNC: 6.9 G/DL (ref 6.1–8.1)
RBC # BLD AUTO: 4.88 MILLION/UL (ref 3.8–5.1)
SODIUM SERPL-SCNC: 139 MMOL/L (ref 135–146)
TRIGL SERPL-MCNC: 180 MG/DL
TSH SERPL-ACNC: 1.23 MIU/L (ref 0.4–4.5)
WBC # BLD AUTO: 8.3 THOUSAND/UL (ref 3.8–10.8)

## 2025-06-12 ENCOUNTER — APPOINTMENT (OUTPATIENT)
Dept: PRIMARY CARE | Facility: CLINIC | Age: 75
End: 2025-06-12
Payer: MEDICARE

## 2025-06-12 VITALS
SYSTOLIC BLOOD PRESSURE: 108 MMHG | BODY MASS INDEX: 37.86 KG/M2 | DIASTOLIC BLOOD PRESSURE: 70 MMHG | HEART RATE: 81 BPM | OXYGEN SATURATION: 98 % | RESPIRATION RATE: 18 BRPM | TEMPERATURE: 97.6 F | HEIGHT: 62 IN

## 2025-06-12 DIAGNOSIS — E03.9 HYPOTHYROIDISM, UNSPECIFIED TYPE: ICD-10-CM

## 2025-06-12 DIAGNOSIS — E78.2 HYPERLIPEMIA, MIXED: ICD-10-CM

## 2025-06-12 DIAGNOSIS — E55.9 VITAMIN D DEFICIENCY: ICD-10-CM

## 2025-06-12 DIAGNOSIS — M81.0 POSTMENOPAUSAL BONE LOSS: ICD-10-CM

## 2025-06-12 DIAGNOSIS — Z00.00 ROUTINE GENERAL MEDICAL EXAMINATION AT HEALTH CARE FACILITY: Primary | ICD-10-CM

## 2025-06-12 DIAGNOSIS — Z53.20 STATIN DECLINED: ICD-10-CM

## 2025-06-12 DIAGNOSIS — D50.9 IRON DEFICIENCY ANEMIA, UNSPECIFIED IRON DEFICIENCY ANEMIA TYPE: ICD-10-CM

## 2025-06-12 PROCEDURE — 1160F RVW MEDS BY RX/DR IN RCRD: CPT | Performed by: INTERNAL MEDICINE

## 2025-06-12 PROCEDURE — 1159F MED LIST DOCD IN RCRD: CPT | Performed by: INTERNAL MEDICINE

## 2025-06-12 PROCEDURE — 1170F FXNL STATUS ASSESSED: CPT | Performed by: INTERNAL MEDICINE

## 2025-06-12 PROCEDURE — 1036F TOBACCO NON-USER: CPT | Performed by: INTERNAL MEDICINE

## 2025-06-12 PROCEDURE — G0439 PPPS, SUBSEQ VISIT: HCPCS | Performed by: INTERNAL MEDICINE

## 2025-06-12 PROCEDURE — 99214 OFFICE O/P EST MOD 30 MIN: CPT | Performed by: INTERNAL MEDICINE

## 2025-06-12 SDOH — ECONOMIC STABILITY: FOOD INSECURITY: WITHIN THE PAST 12 MONTHS, YOU WORRIED THAT YOUR FOOD WOULD RUN OUT BEFORE YOU GOT MONEY TO BUY MORE.: NEVER TRUE

## 2025-06-12 SDOH — ECONOMIC STABILITY: FOOD INSECURITY: WITHIN THE PAST 12 MONTHS, THE FOOD YOU BOUGHT JUST DIDN'T LAST AND YOU DIDN'T HAVE MONEY TO GET MORE.: NEVER TRUE

## 2025-06-12 ASSESSMENT — ACTIVITIES OF DAILY LIVING (ADL)
DOING_HOUSEWORK: INDEPENDENT
GROCERY_SHOPPING: INDEPENDENT
BATHING: INDEPENDENT
MANAGING_FINANCES: INDEPENDENT
TAKING_MEDICATION: INDEPENDENT
DRESSING: INDEPENDENT

## 2025-06-12 ASSESSMENT — LIFESTYLE VARIABLES
HOW OFTEN DO YOU HAVE SIX OR MORE DRINKS ON ONE OCCASION: NEVER
HOW OFTEN DO YOU HAVE A DRINK CONTAINING ALCOHOL: NEVER
HOW MANY STANDARD DRINKS CONTAINING ALCOHOL DO YOU HAVE ON A TYPICAL DAY: PATIENT DOES NOT DRINK
SKIP TO QUESTIONS 9-10: 1
AUDIT-C TOTAL SCORE: 0

## 2025-06-12 ASSESSMENT — ENCOUNTER SYMPTOMS
LOSS OF SENSATION IN FEET: 0
DEPRESSION: 0
OCCASIONAL FEELINGS OF UNSTEADINESS: 0

## 2025-06-12 NOTE — ASSESSMENT & PLAN NOTE
Check labs    Orders:    CBC and Auto Differential; Future    Comprehensive Metabolic Panel; Future    Albumin-Creatinine Ratio, Urine Random; Future    TSH with reflex to Free T4 if abnormal; Future

## 2025-06-12 NOTE — ASSESSMENT & PLAN NOTE
LDL and TG levels have increased. Suggest initiation of statin therapy. Patient declines med therapy, will refer to nutritional support, recheck labs in 6 months    Orders:    Referral to Nutrition Services; Future    CBC and Auto Differential; Future    Comprehensive Metabolic Panel; Future    Lipid Panel; Future    Albumin-Creatinine Ratio, Urine Random; Future

## 2025-06-12 NOTE — PROGRESS NOTES
Subjective   Reason for Visit: Kayleen Rincon is an 75 y.o. female here for a Medicare Wellness visit.     Past Medical, Surgical, and Family History reviewed and updated in chart.    Reviewed all medications by prescribing practitioner or clinical pharmacist (such as prescriptions, OTCs, herbal therapies and supplements) and documented in the medical record.    HPI    Right knee pain/ OA of R knee: patient had knee replacement by Dr Liu, she is doing better now.       HLD: Patient was ordered rosuvastatin, she states that she will not take the med      PAD: Followed by Vascular surgery (Dr. Luke), . S/p stent in LLE by vascular surgery (Dr. Luke). No vascular surgery follow-up in past 2-3 years.  Continues to do well/no issues to report.   Takes ASA daily      Diverticulosis/ black stools: Followed by GI (APRN-STACY Aviles). Patient message (11/21/2024) for results review. Pt not anemic, with etiology of black stools correlated to her iron supplementation. Negative stool studies including calprotectin, pancreatic elastase, and tissue transglutaminase IgA.      Colonoscopy (11/3/22): mild non-bleeding diverticulosis in sigmoid colon. Grade I internal hemorrhoid.   EGD (11/3/2022): Single ulcer (3-5 mm)in gastric antrum. Normal duodenal/esophageal mucosa. Patient still has black stools, she still takes iron as noted     JOANNE: Taking oral iron 325 mg x5 days per week.       Vitamin D deficiency: She takes 50,000 international units vitamin D once/weekly.     GERD: Previously taking pantoprazole as needed only  H/o H. Pylori over 1 year ago. Negative H. Pylori screening (1/9/2024).      Back pain: Chronic issues, with back  x30 years. Remains tolerable. Taking acetaminophen. Previous OTC medications have proven ineffective. TENs unit helps a little. She also goes to chiropractor. Exacerbated by walking, improved with rest. Denies loss of bladder/bowel control.             Patient Care Team:  Kailash Triplett MD  "as PCP - General (Internal Medicine)  Kailash Triplett MD as PCP - [employer] PCP     Review of Systems    otherwise negative aside from what was mentioned above in HPI.     Objective   Vitals:  /70 (BP Location: Left arm, Patient Position: Sitting, BP Cuff Size: Large adult)   Pulse 81   Temp 36.4 °C (97.6 °F) (Temporal)   Resp 18   Ht 1.575 m (5' 2\")   SpO2 98%   BMI 37.86 kg/m²       Physical Exam               Constitutional:       Appearance: Normal appearance. She is obese. She is not ill-appearing, sitting in chair.  Accompanied by   HENT:      Head: Normocephalic and atraumatic. No thyromegaly or neck masses  Eyes:      Extraocular Movements: Extraocular movements intact.      Conjunctiva/sclera: Conjunctivae normal.   Cardiovascular:      Rate and Rhythm: Normal rate and regular rhythm.      Pulses: Normal pulses.      Heart sounds: Normal heart sounds. soft 1/6 systolic murmur noted over the RUSB, No carotid bruits, no peripheral edema  Pulmonary:      Effort: Pulmonary effort is normal.      Comments: Diminished breath sounds throughout all fields, especially in the bases bilaterally  Abdominal:          Comments: Abdomen is obese   Musculoskeletal:        s/p right TKA  Skin:     General: Skin is warm and dry. No lesions on exposed skin  Neurological:      General: No focal deficit present.      Mental Status: She is alert and oriented to person, place, and time.   Psychiatric:         Mood and Affect: Mood normal.         Behavior: Behavior normal.      Assessment & Plan  Routine general medical examination at [facility] care facility  Medicare wellness exam completed  Orders:    1 Year Follow Up In Primary Care - Wellness Exam; Future    CBC and Auto Differential; Future    Comprehensive Metabolic Panel; Future    Albumin-Creatinine Ratio, Urine Random; Future    Hyperlipemia, mixed  LDL and TG levels have increased. Suggest initiation of statin therapy. Patient " declines med therapy, will refer to nutritional support, recheck labs in 6 months    Orders:    Referral to Nutrition Services; Future    CBC and Auto Differential; Future    Comprehensive Metabolic Panel; Future    Lipid Panel; Future    Albumin-Creatinine Ratio, Urine Random; Future    Hypothyroidism, unspecified type  Check labs    Orders:    CBC and Auto Differential; Future    Comprehensive Metabolic Panel; Future    Albumin-Creatinine Ratio, Urine Random; Future    TSH with reflex to Free T4 if abnormal; Future    Vitamin D deficiency  Continue vitamin D, check labs    Orders:    CBC and Auto Differential; Future    Comprehensive Metabolic Panel; Future    Vitamin D 25-Hydroxy,Total (for eval of Vitamin D levels); Future    Albumin-Creatinine Ratio, Urine Random; Future    Iron deficiency anemia, unspecified iron deficiency anemia type  Monitor labs    Orders:    CBC and Auto Differential; Future    Comprehensive Metabolic Panel; Future    Albumin-Creatinine Ratio, Urine Random; Future    Iron and TIBC; Future    Statin declined  Nutritional support consult ordered       Postmenopausal bone loss  Check DEXA  Orders:    XR DEXA bone density; Future     Follow up in 6 months, Medicare wellness visit completed

## 2025-06-12 NOTE — ASSESSMENT & PLAN NOTE
Monitor labs    Orders:    CBC and Auto Differential; Future    Comprehensive Metabolic Panel; Future    Albumin-Creatinine Ratio, Urine Random; Future    Iron and TIBC; Future

## 2025-06-12 NOTE — ASSESSMENT & PLAN NOTE
Medicare wellness exam completed  Orders:    1 Year Follow Up In Primary Care - Wellness Exam; Future    CBC and Auto Differential; Future    Comprehensive Metabolic Panel; Future    Albumin-Creatinine Ratio, Urine Random; Future

## 2025-06-26 ENCOUNTER — HOSPITAL ENCOUNTER (OUTPATIENT)
Dept: RADIOLOGY | Facility: CLINIC | Age: 75
Discharge: HOME | End: 2025-06-26
Payer: MEDICARE

## 2025-06-26 DIAGNOSIS — M81.0 POSTMENOPAUSAL BONE LOSS: ICD-10-CM

## 2025-06-26 PROCEDURE — 77080 DXA BONE DENSITY AXIAL: CPT

## 2025-06-26 PROCEDURE — 77080 DXA BONE DENSITY AXIAL: CPT | Performed by: RADIOLOGY

## 2025-06-30 ENCOUNTER — APPOINTMENT (OUTPATIENT)
Dept: RADIOLOGY | Facility: CLINIC | Age: 75
End: 2025-06-30
Payer: MEDICARE

## 2025-08-26 DIAGNOSIS — E55.9 VITAMIN D DEFICIENCY: ICD-10-CM

## 2025-08-26 RX ORDER — ERGOCALCIFEROL 1.25 MG/1
1.25 CAPSULE ORAL
Qty: 12 CAPSULE | Refills: 1 | Status: SHIPPED | OUTPATIENT
Start: 2025-08-26

## 2025-12-18 ENCOUNTER — APPOINTMENT (OUTPATIENT)
Dept: PRIMARY CARE | Facility: CLINIC | Age: 75
End: 2025-12-18
Payer: MEDICARE

## 2026-02-17 ENCOUNTER — APPOINTMENT (OUTPATIENT)
Dept: ORTHOPEDIC SURGERY | Facility: CLINIC | Age: 76
End: 2026-02-17
Payer: MEDICARE

## 2026-06-15 ENCOUNTER — APPOINTMENT (OUTPATIENT)
Dept: PRIMARY CARE | Facility: CLINIC | Age: 76
End: 2026-06-15
Payer: MEDICARE

## (undated) DEVICE — CONTAINER, SPECIMEN, 4 OZ, OR PEEL PACK, STERILE

## (undated) DEVICE — SUTURE, VICRYL, 1, 36 IN, CT-1, UNDYED

## (undated) DEVICE — NAIL, HEADED, 1-1/2 IN

## (undated) DEVICE — PADDING, CAST, SOFT, 6 X 4YD, LF

## (undated) DEVICE — DRAPE, SHEET, U, STERI DRAPE, 47 X 51 IN, DISPOSABLE, STERILE

## (undated) DEVICE — GLOVE, PROTEXIS PI CLASSIC, SZ-7.5, PF, LF

## (undated) DEVICE — TIP, SUCTION, FRAZIER, EXTRA WIDE, W/O CONTROL VENT, W/OBTURATOR, 18 FR, DISPOSABLE

## (undated) DEVICE — SUTURE, STRATAFIX, SPIRAL MONOCRYL PLUS, 3-0, PS-1 45CM, UNDYED

## (undated) DEVICE — DRAPE, INCISE, ANTIMICROBIAL, IOBAN 2, LARGE, 17 X 23 IN, DISPOSABLE, STERILE

## (undated) DEVICE — KIT, TOTAL JOINT, CUSTOM, PORTAGE

## (undated) DEVICE — HOOD, SURGICAL, FLYTE, T7 PLUS, PEEL AWAY SHIELD

## (undated) DEVICE — DRAPE, EXTREMITY, HEAVY DUTY, W/ARMBOARD COVERS

## (undated) DEVICE — GLOVE, SURGICAL, PI ORTHO PRO, BIOGEL, SZ 8.0

## (undated) DEVICE — COVER HANDLE LIGHT, STERIS, BLUE, STERILE

## (undated) DEVICE — DRILL PEG, TIBIAL, TRITANIUM, 1/8 IN

## (undated) DEVICE — BANDAGE, ESMARK, 6 IN X 12 FT

## (undated) DEVICE — HANDPIECE, INTERPULSE, W/FAN SPRAY TIP AND SUCTION TUBE

## (undated) DEVICE — Device

## (undated) DEVICE — SUTURE, VICRYL, 2-0, 36 IN, CT-1, UNDYED

## (undated) DEVICE — PAD, KNEE PATIENT, DEMAYO, DISP, STERILE

## (undated) DEVICE — NEEDLE, HYPODERMIC, REGULAR WALL, REGULAR BEVEL, 22 G X 1.5 IN

## (undated) DEVICE — BLADE, PRECISION 2.0 CARTRIDGE, 25 X 1.27 X 105

## (undated) DEVICE — APPLICATOR, CHLORAPREP, W/ORANGE TINT, 26ML

## (undated) DEVICE — DRAPE, INSTRUMENT, W/POUCH, STERI DRAPE, 7 X 11 IN, DISPOSABLE, STERILE

## (undated) DEVICE — MANIFOLD, 4 PORT NEPTUNE STANDARD

## (undated) DEVICE — DRESSING, MEPILEX BORDER, POST-OP AG, 4 X 10 IN

## (undated) DEVICE — SYRINGE, 30 CC, LUER LOCK

## (undated) DEVICE — SUTURE, STRATAFIX, SYMMETRIC, SIZE 1, 45CM, KNOTLESS

## (undated) DEVICE — SOLUTION, IRRIGATION, SODIUM CHLORIDE 0.9%, 1000 ML, POUR BOTTLE

## (undated) DEVICE — DRAPE, SHEET, THREE QUARTER, FAN FOLD, 57 X 77 IN

## (undated) DEVICE — PAD, GROUNDING, ELECTROSURGICAL, W/9 FT CABLE, POLYHESIVE II, ADULT, LF

## (undated) DEVICE — CLOSURE SYSTEM, DERMABOND, PRINEO, 22CM, STERILE

## (undated) DEVICE — BANDAGE, COMPRESSION, EZE-BAND, DBL, 6 X 11 YDS